# Patient Record
Sex: FEMALE | Race: WHITE | NOT HISPANIC OR LATINO | Employment: OTHER | URBAN - METROPOLITAN AREA
[De-identification: names, ages, dates, MRNs, and addresses within clinical notes are randomized per-mention and may not be internally consistent; named-entity substitution may affect disease eponyms.]

---

## 2017-01-10 ENCOUNTER — APPOINTMENT (OUTPATIENT)
Dept: MEDICAL GROUP | Facility: PHYSICIAN GROUP | Age: 77
End: 2017-01-10
Payer: MEDICARE

## 2017-02-10 ENCOUNTER — OFFICE VISIT (OUTPATIENT)
Dept: MEDICAL GROUP | Facility: MEDICAL CENTER | Age: 77
End: 2017-02-10
Payer: MEDICARE

## 2017-02-10 VITALS
BODY MASS INDEX: 21.66 KG/M2 | HEART RATE: 68 BPM | DIASTOLIC BLOOD PRESSURE: 80 MMHG | WEIGHT: 130 LBS | SYSTOLIC BLOOD PRESSURE: 128 MMHG | HEIGHT: 65 IN | RESPIRATION RATE: 16 BRPM | TEMPERATURE: 99.1 F | OXYGEN SATURATION: 98 %

## 2017-02-10 DIAGNOSIS — I87.2 CHRONIC VENOUS INSUFFICIENCY: ICD-10-CM

## 2017-02-10 DIAGNOSIS — I10 ESSENTIAL HYPERTENSION: ICD-10-CM

## 2017-02-10 DIAGNOSIS — M81.0 OSTEOPOROSIS: ICD-10-CM

## 2017-02-10 DIAGNOSIS — Z00.00 HEALTH CARE MAINTENANCE: ICD-10-CM

## 2017-02-10 DIAGNOSIS — K21.9 GASTROESOPHAGEAL REFLUX DISEASE WITHOUT ESOPHAGITIS: ICD-10-CM

## 2017-02-10 DIAGNOSIS — K86.2 PANCREATIC CYST: ICD-10-CM

## 2017-02-10 DIAGNOSIS — R73.02 IGT (IMPAIRED GLUCOSE TOLERANCE): ICD-10-CM

## 2017-02-10 DIAGNOSIS — C50.011 MALIGNANT NEOPLASM OF AREOLA OF RIGHT BREAST IN FEMALE (HCC): ICD-10-CM

## 2017-02-10 PROCEDURE — 90662 IIV NO PRSV INCREASED AG IM: CPT | Performed by: FAMILY MEDICINE

## 2017-02-10 PROCEDURE — 99214 OFFICE O/P EST MOD 30 MIN: CPT | Mod: 25 | Performed by: FAMILY MEDICINE

## 2017-02-10 PROCEDURE — 4040F PNEUMOC VAC/ADMIN/RCVD: CPT | Mod: 8P | Performed by: FAMILY MEDICINE

## 2017-02-10 PROCEDURE — G8482 FLU IMMUNIZE ORDER/ADMIN: HCPCS | Performed by: FAMILY MEDICINE

## 2017-02-10 PROCEDURE — 1036F TOBACCO NON-USER: CPT | Performed by: FAMILY MEDICINE

## 2017-02-10 PROCEDURE — G8419 CALC BMI OUT NRM PARAM NOF/U: HCPCS | Performed by: FAMILY MEDICINE

## 2017-02-10 PROCEDURE — 1101F PT FALLS ASSESS-DOCD LE1/YR: CPT | Performed by: FAMILY MEDICINE

## 2017-02-10 PROCEDURE — G0008 ADMIN INFLUENZA VIRUS VAC: HCPCS | Performed by: FAMILY MEDICINE

## 2017-02-10 RX ORDER — GUAIFENESIN 400 MG/1
1 TABLET ORAL DAILY
COMMUNITY
End: 2020-06-11

## 2017-02-10 ASSESSMENT — PATIENT HEALTH QUESTIONNAIRE - PHQ9: CLINICAL INTERPRETATION OF PHQ2 SCORE: 0

## 2017-02-10 NOTE — PROGRESS NOTES
CC: Establish new PCP.    HPI:  Irina presents today to establish new primary care relationship. Was a patient of Dr Mcgill.    Active, and independent with all ADLs. Has the following medical issues:    Essential hypertension, BP has been adequately controlled on current medication. Denies headache, chest pain, and SOB.She is on lisinopril 10 mg daily.No side effects.    Gastroesophageal reflux disease , has been doing fine on Omeprazole 20 mg daily.Denies epigastric pain, and heart burn.    Impaired glucose tolerance, his last A1C was 5.8, BS was 113.Denies excessive urination and excessive drinking of water. Patient was counseled about life style modification ( low carb diet, and exercise),     Has Pancreatic cyst that has been asymptomatic. Ct abdomen was done in 9/2016 showed, in the dorsal head/uncinate process of the pancreas there is an unchanged oval cystic lesion which with no demonstratable internal enhancement. On the arterial phase images this measures 17x 8 x 14 mm. There may be a few tiny adjacent cysts superior and ventral to the dominant lesion.The pancreatic duct is normal in caliber and patent and courses along the peripheral aspect of the cyst but does not definitely communicate with the cyst.    Last bone density scan was done in 10/2014, showed osteoporosis. Has been on vit and calcium, tried medications in the patient , but has some side effects. Does not remember which one.No recent h/o fractures.    Chronic venous insufficiency, patient has h/o venous striping in the past.And a recent laser venous ablation . Has been having chronic leg swelling, itching, or legs ulcer. Has been using compression stocking.    Has h/o right breast cancer in 2006, underwent lumpectomy and radiation. Has had no problem since then, she is due for mammogram.    Due for flu shot. Was told in the past she is allergic to pneumonia shot ( had a bad reaction only on the arm).    Patient Active Problem List     Diagnosis Date Noted   • Peripheral vascular disease (CMS-HCC) 05/15/2015     Priority: High   • Raynaud's phenomenon (by history or observed) 02/14/2014     Priority: Medium   • Chronic venous insufficiency 02/14/2014     Priority: Medium   • Hypertension 12/05/2013     Priority: Medium   • MEDICAL HOME    • Right knee pain 06/08/2015   • Cyst and pseudocyst of pancreas 06/16/2014   • Essential hypertension 03/03/2014   • Left knee pain 12/30/2013   • Osteoarthritis 12/07/2013   • Diverticulosis 12/05/2013   • Pancreatic cyst 12/05/2013       Current Outpatient Prescriptions   Medication Sig Dispense Refill   • Misc Natural Products (BUTCHERS BROOM PO) Take  by mouth.     • Guaifenesin 400 MG Tab Take  by mouth.     • lisinopril (PRINIVIL) 10 MG Tab Take 1 Tab by mouth every day. 90 Tab 3   • omeprazole (PRILOSEC) 20 MG delayed-release capsule Take 1 Cap by mouth every day. 90 Cap 3   • loratadine (CLARITIN) 10 MG TABS Take 10 mg by mouth every day.     • alprazolam (XANAX) 0.25 MG TABS Take 1 Tab by mouth at bedtime as needed. 30 Tab 0   • Multiple Vitamins-Minerals (MULTIVITAMIN PO) Take  by mouth every day.       No current facility-administered medications for this visit.         Allergies as of 02/10/2017 - Osvaldo as Reviewed 02/10/2017   Allergen Reaction Noted   • Ibuprofen  07/27/2014   • Morphine Vomiting 12/17/2013   • Other misc  12/17/2013        Social History     Social History   • Marital Status:      Spouse Name: N/A   • Number of Children: N/A   • Years of Education: N/A     Occupational History   • Not on file.     Social History Main Topics   • Smoking status: Never Smoker    • Smokeless tobacco: Not on file   • Alcohol Use: No   • Drug Use: No   • Sexual Activity: Not Currently     Birth Control/ Protection: Post-Menopausal     Other Topics Concern   • Not on file     Social History Narrative       Family History   Problem Relation Age of Onset   • Diabetes     • Hypertension     • Stroke    "  • Cancer     • Heart Disease Mother    • Hypertension Mother    • Stroke Mother    • Diabetes Mother    • Heart Disease Brother    • Diabetes Brother        Past Surgical History   Procedure Laterality Date   • Appendectomy  1974   • Tonsillectomy and adenoidectomy  as child   • Hysterectomy, total abdominal  1974                           • Colonoscopy - endo  12/6/2013     Performed by Markus Juarez D.O. at ENDOSCOPY [facility]   • Vein stripping  1974/1999/2013     b/l legs   • Cataract extraction with iol  2003     bilateral   • Knee arthroplasty total  12/30/2013     left; Performed by Markus York M.D. at SUR[facility]ERY [facility]   • Lumpectomy  2006     right breast   • Gastroscopy with biopsy  6/16/2014     Performed by Dino Pavon M.D. at [facility]   • Egd w/endoscopic ultrasound  6/16/2014     Performed by Dino Pavon M.D. at [facility]   • Pr radiation therapy plan simple  2006   • Knee arthroplasty total Right 6/8/2015     Procedure: KNEE ARTHROPLASTY TOTAL;  Surgeon: Markus York M.D.;  Location: [facility];  Service:        ROS:  Denies any Headache, Blurred Vision, Confusion Chest pain,  Shortness of breath,  Abdominal pain, Changes of bowel or bladder, Lower ext edema, Fevers, Nights sweats, Weight Changes, Focal weakness or numbness.  All other systems are negative.    /80 mmHg  Pulse 68  Temp(Src) 37.3 °C (99.1 °F)  Resp 16  Ht 1.651 m (5' 5\")  Wt 58.968 kg (130 lb)  BMI 21.63 kg/m2  SpO2 98%    Physical Exam:  Gen:         Alert and oriented, No apparent distress.  HEENT:   Perrla, TM clear,  Oralpharynx no erythema or exudates.  Neck:       No Jugular venous distension, Lymphadenopathy, Thyromegaly, Bruits.  Lungs:     Clear to auscultation bilaterally  CV:          Regular rate and rhythm. No murmurs, rubs or gallops.  Abd:         Soft non tender, non distended. Normal active bowel sounds. No  " hepatosplenomegaly, No pulsatile masses.  Ext:          No clubbing, cyanosis, edema.      Assessment and Plan.   76 y.o. female     1. Essential hypertension  Has been adequately controlled on current medication. Denies headache, chest pain, and SOB.  Continue on lisinopril 10 mg daily.    - CBC WITH DIFFERENTIAL; Future  - COMP METABOLIC PANEL; Future  - LIPID PANEL  - TSH; Future    2. Gastroesophageal reflux disease without esophagitis  Doing fine on Omeprazole 20 mg daily.    3. IGT (impaired glucose tolerance)  Last A1C was 5.8, BS was 113.  Patient was counseled about life style modification ( low carb diet, and exercise),     4. Pancreatic cyst  Stable. Asymptomatic  Will continue observe.    5. Osteoporosis  Last bone density scan was done in 10/2014, showed osteoporosis. Has been on vit and calcium, tried medications in the patient , but has some side effects. Does remember which one.  Will repeat bone density.    6. Health care maintenance  Due for flu shot. Was told in the past she is allergic to pneumonia shot ( had a bad reaction only on the arm).    - INFLUENZA VACCINE, HIGH DOSE (65+ ONLY)    7. Chronic venous insufficiency  Has had striping in the past.  Uses compression stocking.    8. Malignant neoplasm of areola of right breast in female (CMS-HCC)  In 2006, s/p lumpectomy and radiation. No problem since then  Due for mammogram.    - MA-SCREEN MAMMO W/CAD-BILAT

## 2017-02-10 NOTE — MR AVS SNAPSHOT
"        Irina Fuller   2/10/2017 9:20 AM   Office Visit   MRN: [id number]    Department:  [address] [place] [address]   Dept Phone:  [phone]    Description:  Female : 1940   Provider:  Vic Maciel M.D.           Reason for Visit     Establish Care           Allergies as of 2/10/2017     Allergen Noted Reactions    Ibuprofen 2014       Stomach upset    Morphine 2013   Vomiting    Other Misc 2013       Metals: zinc oxide, vanadium chloride, manganese chloride      You were diagnosed with     Essential hypertension   [5871494]       Gastroesophageal reflux disease without esophagitis   [609116]       IGT (impaired glucose tolerance)   [996224]       Pancreatic cyst   [207147]       Osteoporosis   [1103723]       Health care maintenance   [741001]       Chronic venous insufficiency   [172735]       Malignant neoplasm of areola of right breast in female (CMS-HCC)   [3693002]         Vital Signs     Blood Pressure Pulse Temperature Respirations Height Weight    128/80 mmHg 68 37.3 °C (99.1 °F) 16 1.651 m (5' 5\") 58.968 kg (130 lb)    Body Mass Index Oxygen Saturation Smoking Status             21.63 kg/m2 98% Never Smoker          Basic Information     Date Of Birth Sex Race Ethnicity Preferred Language    1940 Female White Non- English      Your appointments     May 10, 2017  9:00 AM   Established Patient with Vic Maciel M.D.   [facility] [address]    [address] [facility] [address]  [facility] [address]   [phone]           You will be receiving a confirmation call a few days before your appointment from our automated call confirmation system.              Problem List              ICD-10-CM Priority Class Noted - Resolved    Diverticulosis K57.90   2013 - Present    Hypertension I10 Medium  2013 - Present    Pancreatic cyst K86.2   2013 - Present    Osteoarthritis M19.90   2013 - Present    Left knee pain M25.562   " 12/30/2013 - Present    Raynaud's phenomenon (by history or observed) I73.00 Medium  2/14/2014 - Present    Chronic venous insufficiency I87.2 Medium  2/14/2014 - Present    Essential hypertension I10   3/3/2014 - Present    Cyst and pseudocyst of pancreas K86.2, K86.3   6/16/2014 - Present    Peripheral vascular disease (CMS-HCC) I73.9 High  5/15/2015 - Present    Right knee pain M25.561   6/8/2015 - Present    MEDICAL HOME    Unknown - Present      Health Maintenance        Date Due Completion Dates    IMM DTaP/Tdap/Td Vaccine (1 - Tdap) 4/3/1957 ---    PAP SMEAR 4/3/1959 ---    IMM ZOSTER VACCINE 4/3/1998 ---    IMM PNEUMOCOCCAL 65+ (ADULT) LOW/MEDIUM RISK SERIES (1 of 2 - PCV13) 4/3/2003 ---    MAMMOGRAM 10/14/2015 10/14/2014, 10/14/2014, 10/14/2014, 10/14/2014, 10/14/2014, 10/8/2014    IMM INFLUENZA (1) 9/1/2016 ---    BONE DENSITY 10/8/2019 10/8/2014    COLONOSCOPY 12/6/2023 12/6/2013            Current Immunizations     Influenza Vaccine Adult HD  Incomplete      Below and/or attached are the medications your provider expects you to take. Review all of your home medications and newly ordered medications with your provider and/or pharmacist. Follow medication instructions as directed by your provider and/or pharmacist. Please keep your medication list with you and share with your provider. Update the information when medications are discontinued, doses are changed, or new medications (including over-the-counter products) are added; and carry medication information at all times in the event of emergency situations     Allergies:  IBUPROFEN - (reactions not documented)     MORPHINE - Vomiting     OTHER MISC - (reactions not documented)               Medications  Valid as of: February 10, 2017 - 10:08 AM    Generic Name Brand Name Tablet Size Instructions for use    GuaiFENesin (Tab) Guaifenesin 400 MG Take  by mouth.        Lisinopril (Tab) PRINIVIL 10 MG Take 1 Tab by mouth every day.        Loratadine (Tab)  CLARITIN 10 MG Take 10 mg by mouth every day.        Misc Natural Products   Take  by mouth.        Multiple Vitamins-Minerals   Take  by mouth every day.        Omeprazole (CAPSULE DELAYED RELEASE) PRILOSEC 20 MG Take 1 Cap by mouth every day.        .                 Medicines prescribed today were sent to:     [id number] - [place], [place] - [address] [place] [address]    Phone: [phone] Fax: [phone]    Open 24 Hours?: No      Medication refill instructions:       If your prescription bottle indicates you have medication refills left, it is not necessary to call your provider’s office. Please contact your pharmacy and they will refill your medication.    If your prescription bottle indicates you do not have any refills left, you may request refills at any time through one of the following ways: The online [employer]yo system (except Urgent Care), by calling your provider’s office, or by asking your pharmacy to contact your provider’s office with a refill request. Medication refills are processed only during regular business hours and may not be available until the next business day. Your provider may request additional information or to have a follow-up visit with you prior to refilling your medication.   *Please Note: Medication refills are assigned a new Rx number when refilled electronically. Your pharmacy may indicate that no refills were authorized even though a new prescription for the same medication is available at the pharmacy. Please request the medicine by name with the pharmacy before contacting your provider for a refill.        Your To Do List     Future Labs/Procedures Complete By Expires    CBC WITH DIFFERENTIAL  As directed 2/10/2018    COMP METABOLIC PANEL  As directed 2/10/2018    DS-BONE DENSITY STUDY (DEXA)  As directed 8/13/2017    TSH  As directed 2/11/2018      Other Notes About Your Plan     Irina is a Medical Home patient at [facility].           [employer] Access Code: Activation code not generated  Current [employer] Status: Active

## 2017-02-14 ENCOUNTER — PATIENT OUTREACH (OUTPATIENT)
Dept: HEALTH INFORMATION MANAGEMENT | Facility: OTHER | Age: 77
End: 2017-02-14

## 2017-02-14 NOTE — PROGRESS NOTES
Pt referred to  by PCP. LSW received incoming call from pt today. She reported PCP gave her LSW contact information to determine if there was assistance available to obtain compression socks. Pt has prescription from PCP but reported she called her insurance and it is not covered. Pt reported she has limited income.     Reviewed pt's current income/resources and programs she is currently receiving. Pt's is a  and make $1119 from [employer] a month. She denies any Medicare deductions reporting that is paid for by the state. Via information it is likely pt is already enrolled int he Medicare Savings Programs through Medicaid. Through further discussion pt also has a full subsidy with [employer] and receives $16/month in SNAP benefits. Pt does not pay and energy bill; thus, does not have the Energy Assistance progam.    Discussed with pt that [facility] sometimes gets in compression socks and if qualified she maybe able to get from organization. Outreach call to [facility], representative reported they do no currently have any in stock and it is an item that agency does not get frequently and when they do item goes quickly. Pt voiced understanding. Provided pt with contact information to [facility] to allow her to follow up with agency directly. Also provided pt with [facility] [facility] information as agency may also be able to assist or help with other needs. Pt voiced understanding.     Lastly discussed that pt may need to pay privately for compression socks. Encouraged pt to shop around and speak with PCP regarding brand recommendations. Pt voiced understanding. Pt provided with contact information should she have additional questions.     Plan:  · Resource information on [facility] and [employer] mailed to pt. Pt to follow up with organizations directly.   · LSW will not actively follow pt at this time.

## 2017-03-22 ENCOUNTER — HOSPITAL ENCOUNTER (OUTPATIENT)
Dept: RADIOLOGY | Facility: MEDICAL CENTER | Age: 77
End: 2017-03-22
Attending: FAMILY MEDICINE
Payer: MEDICARE

## 2017-03-22 PROCEDURE — 77063 BREAST TOMOSYNTHESIS BI: CPT

## 2017-03-23 RX ORDER — OMEPRAZOLE 20 MG/1
CAPSULE, DELAYED RELEASE ORAL
Refills: 3 | OUTPATIENT
Start: 2017-03-23

## 2017-03-27 RX ORDER — OMEPRAZOLE 20 MG/1
CAPSULE, DELAYED RELEASE ORAL
Refills: 3 | OUTPATIENT
Start: 2017-03-27

## 2017-03-28 RX ORDER — OMEPRAZOLE 20 MG/1
CAPSULE, DELAYED RELEASE ORAL
Refills: 3 | OUTPATIENT
Start: 2017-03-28

## 2017-03-31 DIAGNOSIS — I10 ESSENTIAL HYPERTENSION: ICD-10-CM

## 2017-03-31 DIAGNOSIS — F41.9 ANXIETY: ICD-10-CM

## 2017-03-31 DIAGNOSIS — K21.9 GASTROESOPHAGEAL REFLUX DISEASE WITHOUT ESOPHAGITIS: ICD-10-CM

## 2017-03-31 RX ORDER — LISINOPRIL 10 MG/1
10 TABLET ORAL
Qty: 90 TAB | Refills: 3 | Status: SHIPPED | OUTPATIENT
Start: 2017-03-31 | End: 2018-02-06 | Stop reason: SDUPTHER

## 2017-03-31 RX ORDER — ALPRAZOLAM 0.25 MG/1
0.25 TABLET ORAL NIGHTLY PRN
Qty: 30 TAB | Refills: 0
Start: 2017-03-31

## 2017-03-31 RX ORDER — OMEPRAZOLE 20 MG/1
20 CAPSULE, DELAYED RELEASE ORAL DAILY
Qty: 90 CAP | Refills: 3 | Status: SHIPPED | OUTPATIENT
Start: 2017-03-31 | End: 2018-02-06 | Stop reason: SDUPTHER

## 2017-04-05 ENCOUNTER — PATIENT OUTREACH (OUTPATIENT)
Dept: HEALTH INFORMATION MANAGEMENT | Facility: OTHER | Age: 77
End: 2017-04-05

## 2017-04-06 RX ORDER — ALPRAZOLAM 0.25 MG/1
0.25 TABLET ORAL NIGHTLY PRN
Qty: 30 TAB | Refills: 0 | Status: SHIPPED
Start: 2017-04-06 | End: 2018-02-06

## 2017-04-06 NOTE — TELEPHONE ENCOUNTER
Was the patient seen in the last year in this department? Yes     Does patient have an active prescription for medications requested? No     Received Request Via: Patient      Patient stated that she is flying next month ? Please advise

## 2017-04-12 ENCOUNTER — TELEPHONE (OUTPATIENT)
Dept: MEDICAL GROUP | Facility: MEDICAL CENTER | Age: 77
End: 2017-04-12

## 2017-04-12 ENCOUNTER — TELEPHONE (OUTPATIENT)
Dept: HEALTH INFORMATION MANAGEMENT | Facility: OTHER | Age: 77
End: 2017-04-12

## 2017-04-12 NOTE — TELEPHONE ENCOUNTER
Patient is over the recommended age and is showing overdue for a pap smear in Health Maintenance.    Please reply to this message as to whether these tests are appropriate and I will update the Health Maintenance Topic or contact the patient to schedule.    Patient also reported having a reaction to the pneumococcal vaccine when she got it is 2009 with her previous provider in [place]. She reported having severe swelling and pain in her arm. She is still showing due for this immunization on her health maintenance table.

## 2017-04-12 NOTE — TELEPHONE ENCOUNTER
Future Appointments      Provider Department [place]   4/20/2017 2:00 PM Vic Maciel M.D.; [facility] [address]    5/10/2017 9:00 AM Vic Maciel M.D. [place] [address]      ANNUAL WELLNESS VISIT PRE-VISIT PLANNING     1.  Reviewed last PCP office visit assessment and plan notes: Yes  2.  If any orders were placed last visit do we have Results/Consult Notes?        •  Labs? Yes not completed       •  Imaging? Yes 03/22/17 Mammogram       •  Referrals? Yes 02/10/17 DEXA  3.   Patient Care Coordination Note was updated with  Diagnosis information: yes there is no note  4.   Updated immunizations by using WebIZ?: yes  · Is patient due for Tdap/Shingles? Yes.  If yes, was patient alerted of copay? yes   5.   Has the patient had the flu vaccine this season? Yes.  6.   Has patient had the pneumococcal vaccine? No.   7.   Last office visit 02/10/17          Patient is due for these Health Maintenance Topics:   Health Maintenance Due   Topic Date Due   • Annual Wellness Visit  Scheduled for 04/20/17   • IMM DTaP/Tdap/Td Vaccine (1 - Tdap) 04/03/1957   • IMM ZOSTER VACCINE  04/03/1998   • IMM PREVNAR 13 04/03/2003       8.    Updated Care Team with DME Companies and all specialists?        •   Gait devices, O2, CPAP, etc: N\A        •   Other specialists (GYN, cardiology, endo, etc): yes        •   Eye professional: no When was last eye exam? Several  years        •   ZORAN letter will be faxed to:  Eye Dr for Retinal Exam records    9. DPA/Advanced Directive:  Patient has Living Will, but it is not on file. Instructed to bring in a copy to scan into their chart.    10. Patient has: No chronic diseases to review    11.  Is patient in need of any refills prior to office visit? No  12. Patient was informed to arrive 15 min prior to their scheduled appointment and bring in their medication bottles? yes  13. Patient was advised: “This is a free wellness visit. The  provider will screen for medical conditions to help you stay healthy. If you have other concerns to address you may be asked to discuss these at a separate visit or there may be an additional fee.”  Yes

## 2017-04-13 RX ORDER — FLUTICASONE PROPIONATE 50 MCG
1 SPRAY, SUSPENSION (ML) NASAL DAILY
COMMUNITY
End: 2018-02-06

## 2017-04-13 RX ORDER — DIOSMIN 100 %
600 POWDER (GRAM) MISCELLANEOUS
COMMUNITY
End: 2018-02-06

## 2017-04-14 ENCOUNTER — HOSPITAL ENCOUNTER (OUTPATIENT)
Dept: LAB | Facility: MEDICAL CENTER | Age: 77
End: 2017-04-14
Attending: FAMILY MEDICINE
Payer: MEDICARE

## 2017-04-14 DIAGNOSIS — I10 ESSENTIAL HYPERTENSION: ICD-10-CM

## 2017-04-14 LAB
ALBUMIN SERPL BCP-MCNC: 4.2 G/DL (ref 3.2–4.9)
ALBUMIN/GLOB SERPL: 1.4 G/DL
ALP SERPL-CCNC: 75 U/L (ref 30–99)
ALT SERPL-CCNC: 10 U/L (ref 2–50)
ANION GAP SERPL CALC-SCNC: 8 MMOL/L (ref 0–11.9)
AST SERPL-CCNC: 15 U/L (ref 12–45)
BASOPHILS # BLD AUTO: 1.2 % (ref 0–1.8)
BASOPHILS # BLD: 0.04 K/UL (ref 0–0.12)
BILIRUB SERPL-MCNC: 0.7 MG/DL (ref 0.1–1.5)
BUN SERPL-MCNC: 20 MG/DL (ref 8–22)
CALCIUM SERPL-MCNC: 9.5 MG/DL (ref 8.5–10.5)
CHLORIDE SERPL-SCNC: 105 MMOL/L (ref 96–112)
CO2 SERPL-SCNC: 25 MMOL/L (ref 20–33)
CREAT SERPL-MCNC: 0.82 MG/DL (ref 0.5–1.4)
EOSINOPHIL # BLD AUTO: 0.11 K/UL (ref 0–0.51)
EOSINOPHIL NFR BLD: 3.2 % (ref 0–6.9)
ERYTHROCYTE [DISTWIDTH] IN BLOOD BY AUTOMATED COUNT: 40.4 FL (ref 35.9–50)
GFR SERPL CREATININE-BSD FRML MDRD: >60 ML/MIN/1.73 M 2
GLOBULIN SER CALC-MCNC: 2.9 G/DL (ref 1.9–3.5)
GLUCOSE SERPL-MCNC: 126 MG/DL (ref 65–99)
HCT VFR BLD AUTO: 42.4 % (ref 37–47)
HGB BLD-MCNC: 14.7 G/DL (ref 12–16)
IMM GRANULOCYTES # BLD AUTO: 0.01 K/UL (ref 0–0.11)
IMM GRANULOCYTES NFR BLD AUTO: 0.3 % (ref 0–0.9)
LYMPHOCYTES # BLD AUTO: 0.78 K/UL (ref 1–4.8)
LYMPHOCYTES NFR BLD: 22.9 % (ref 22–41)
MCH RBC QN AUTO: 31.7 PG (ref 27–33)
MCHC RBC AUTO-ENTMCNC: 34.7 G/DL (ref 33.6–35)
MCV RBC AUTO: 91.4 FL (ref 81.4–97.8)
MONOCYTES # BLD AUTO: 0.51 K/UL (ref 0–0.85)
MONOCYTES NFR BLD AUTO: 15 % (ref 0–13.4)
NEUTROPHILS # BLD AUTO: 1.95 K/UL (ref 2–7.15)
NEUTROPHILS NFR BLD: 57.4 % (ref 44–72)
NRBC # BLD AUTO: 0 K/UL
NRBC BLD AUTO-RTO: 0 /100 WBC
PLATELET # BLD AUTO: 269 K/UL (ref 164–446)
PMV BLD AUTO: 11.3 FL (ref 9–12.9)
POTASSIUM SERPL-SCNC: 3.8 MMOL/L (ref 3.6–5.5)
PROT SERPL-MCNC: 7.1 G/DL (ref 6–8.2)
RBC # BLD AUTO: 4.64 M/UL (ref 4.2–5.4)
SODIUM SERPL-SCNC: 138 MMOL/L (ref 135–145)
TSH SERPL DL<=0.005 MIU/L-ACNC: 3.35 UIU/ML (ref 0.3–3.7)
WBC # BLD AUTO: 3.4 K/UL (ref 4.8–10.8)

## 2017-04-14 PROCEDURE — 84443 ASSAY THYROID STIM HORMONE: CPT

## 2017-04-14 PROCEDURE — 85025 COMPLETE CBC W/AUTO DIFF WBC: CPT

## 2017-04-14 PROCEDURE — 36415 COLL VENOUS BLD VENIPUNCTURE: CPT

## 2017-04-14 PROCEDURE — 80053 COMPREHEN METABOLIC PANEL: CPT

## 2017-04-20 ENCOUNTER — APPOINTMENT (OUTPATIENT)
Dept: MEDICAL GROUP | Facility: MEDICAL CENTER | Age: 77
End: 2017-04-20
Payer: MEDICARE

## 2017-05-09 ENCOUNTER — TELEPHONE (OUTPATIENT)
Dept: MEDICAL GROUP | Facility: MEDICAL CENTER | Age: 77
End: 2017-05-09

## 2017-05-09 NOTE — TELEPHONE ENCOUNTER
Future Appointments       Provider Department Center    5/10/2017 9:00 AM Vic Maciel M.D. [facility] [address]          ESTABLISHED PATIENT PRE-VISIT PLANNING     Note: Patient will not be contacted if there is no indication to call. PT was not Contacted.    1.    Reviewed note from last office visit with PCP: YES Last office visit: 02/10/17    2.  If any orders were placed at last visit, do we have Results/Consult Notes?        •  Labs - Labs ordered, completed and results are in chart. 04/14/17       •  Imaging - Imaging ordered, completed and results are in chart. 02/10/17 MAMMO       •  Referrals - No referrals were ordered at last office visit.     3.  Immunizations were updated in Epic using WebIZ?: No WebIZ record       •  Web Iz Recommendations: HEPATITIS A  HEPATITIS B PREVNAR (PCV13)  TDAP VARICELLA (Chicken Pox)  ZOSTAVAX (Shingles)    4.  Patient is due for the following Health Maintenance Topics:   Health Maintenance Due   Topic Date Due   • Annual Wellness Visit  NEEDS TO SCHEDULE   • IMM DTaP/Tdap/Td Vaccine (1 - Tdap) $45 COPAY APPLIES   • IMM ZOSTER VACCINE  $45 COPAY APPLIES   • IMM PREVNAR 13 DUE       5.  Patient was not informed to arrive 15 min prior to their scheduled appointment and bring in their medication bottles.

## 2017-05-10 ENCOUNTER — APPOINTMENT (OUTPATIENT)
Dept: MEDICAL GROUP | Facility: MEDICAL CENTER | Age: 77
End: 2017-05-10
Payer: MEDICARE

## 2017-05-17 ENCOUNTER — APPOINTMENT (OUTPATIENT)
Dept: MEDICAL GROUP | Facility: MEDICAL CENTER | Age: 77
End: 2017-05-17
Payer: MEDICARE

## 2018-02-06 ENCOUNTER — OFFICE VISIT (OUTPATIENT)
Dept: MEDICAL GROUP | Facility: PHYSICIAN GROUP | Age: 78
End: 2018-02-06
Payer: MEDICARE

## 2018-02-06 VITALS
OXYGEN SATURATION: 96 % | SYSTOLIC BLOOD PRESSURE: 124 MMHG | BODY MASS INDEX: 19.99 KG/M2 | HEIGHT: 65 IN | DIASTOLIC BLOOD PRESSURE: 74 MMHG | TEMPERATURE: 98.7 F | HEART RATE: 74 BPM | WEIGHT: 120 LBS

## 2018-02-06 DIAGNOSIS — I87.2 CHRONIC VENOUS INSUFFICIENCY: ICD-10-CM

## 2018-02-06 DIAGNOSIS — E78.5 DYSLIPIDEMIA: ICD-10-CM

## 2018-02-06 DIAGNOSIS — I10 ESSENTIAL HYPERTENSION: ICD-10-CM

## 2018-02-06 DIAGNOSIS — K21.9 GASTROESOPHAGEAL REFLUX DISEASE WITHOUT ESOPHAGITIS: ICD-10-CM

## 2018-02-06 PROCEDURE — 99214 OFFICE O/P EST MOD 30 MIN: CPT | Performed by: FAMILY MEDICINE

## 2018-02-06 RX ORDER — OMEPRAZOLE 20 MG/1
20 CAPSULE, DELAYED RELEASE ORAL DAILY
Qty: 90 CAP | Refills: 3 | Status: SHIPPED | OUTPATIENT
Start: 2018-02-06 | End: 2019-04-09 | Stop reason: SDUPTHER

## 2018-02-06 RX ORDER — LISINOPRIL 10 MG/1
10 TABLET ORAL
Qty: 90 TAB | Refills: 3 | Status: SHIPPED | OUTPATIENT
Start: 2018-02-06 | End: 2019-02-08

## 2018-02-06 RX ORDER — KRILL/OM-3/DHA/EPA/PHOSPHO/AST 500MG-86MG
CAPSULE ORAL
COMMUNITY
End: 2018-11-30

## 2018-02-06 ASSESSMENT — PATIENT HEALTH QUESTIONNAIRE - PHQ9: CLINICAL INTERPRETATION OF PHQ2 SCORE: 0

## 2018-02-06 NOTE — ASSESSMENT & PLAN NOTE
Ongoing issue. Patient reports compliance with lisinopril 10 mg every day; she currently denies any headache, dizziness, chest pain. To review shows a blood pressure heart rate both her normal range for her age.

## 2018-02-06 NOTE — ASSESSMENT & PLAN NOTE
Ongoing issue. Patient reports that she takes omeprazole 20 mg daily; she states that this helps to control her symptoms of heartburn. She currently does not have to take any additional medication.    Of note, review of her chart shows a normal kidney function.

## 2018-02-06 NOTE — ASSESSMENT & PLAN NOTE
Ongoing issue. Patient tells me of a long-standing history of vascular issues in her lower extremities. Based on our discussion she has had at least 3 surgeries due to varicose veins. She currently is wearing compression stockings and states that she continues to have issues on a regular basis. All of her previous surgeries were not in the state of [place]. She will need a new referral for reevaluation and possible treatment.

## 2018-02-06 NOTE — PROGRESS NOTES
Subjective:   Irina Fuller is a 77 y.o. female here today for HTN, leg swelling and pain; GERD    Hypertension  Ongoing issue. Patient reports compliance with lisinopril 10 mg every day; she currently denies any headache, dizziness, chest pain. To review shows a blood pressure heart rate both her normal range for her age.    Chronic venous insufficiency  Ongoing issue. Patient tells me of a long-standing history of vascular issues in her lower extremities. Based on our discussion she has had at least 3 surgeries due to varicose veins. She currently is wearing compression stockings and states that she continues to have issues on a regular basis. All of her previous surgeries were not in the state of [place]. She will need a new referral for reevaluation and possible treatment.    Gastroesophageal reflux disease without esophagitis  Ongoing issue. Patient reports that she takes omeprazole 20 mg daily; she states that this helps to control her symptoms of heartburn. She currently does not have to take any additional medication.    Of note, review of her chart shows a normal kidney function.     Pt is here today to est care    Current medicines (including changes today)  Current Outpatient Prescriptions   Medication Sig Dispense Refill   • Zinc 50 MG Cap Take 50 mg by mouth every day.     • Multiple Vitamins-Iron (CHLORELLA PO) Take  by mouth.     • Krill Oil 500 MG Cap Take  by mouth.     • lisinopril (PRINIVIL) 10 MG Tab Take 1 Tab by mouth every day. 90 Tab 3   • omeprazole (PRILOSEC) 20 MG delayed-release capsule Take 1 Cap by mouth every day. 90 Cap 3   • loratadine (CLARITIN) 10 MG TABS Take 10 mg by mouth every day.     • Multiple Vitamins-Minerals (MULTIVITAMIN PO) Take  by mouth every day.     • Guaifenesin 400 MG Tab Take  by mouth.       No current facility-administered medications for this visit.      She  has a past medical history of Anesthesia (1974/2002); Arthritis; Breast cancer (CMS-[facility]); Cancer  "(CMS-HCC) (2006); CATARACT; Dental disorder; Diverticulitis; GERD (gastroesophageal reflux disease); Heart burn (12/17/13); Hypertension; Indigestion; MEDICAL HOME (6/9/2015); Osteoporosis; Pain; Personal history of venous thrombosis and embolism (12/31/2013); Pneumonia (1984); PVD (peripheral vascular disease) (CMS-HCC); and Unspecified hemorrhagic conditions.    ROS   No chest pain, no shortness of breath, no abdominal pain  +leg swelling and pain     Objective:     Blood pressure 124/74, pulse 74, temperature 37.1 °C (98.7 °F), height 1.651 m (5' 5\"), weight 54.4 kg (120 lb), SpO2 96 %. Body mass index is 19.97 kg/m².   Physical Exam:  Alert, oriented in no acute distress.  Eye contact is good, speech goal directed, affect calm  HEENT: conjunctiva non-injected, sclera non-icteric.  Pinna normal. TM pearly gray.   Oral mucous membranes pink and moist with no lesions.  Neck No adenopathy or masses in the neck or supraclavicular regions.  Lungs: clear to auscultation bilaterally with good excursion.  CV: regular rate and rhythm. Mild systolic ejection murmur noted  Abdomen: soft, nontender, No CVAT  Ext: 2+pitting edema bilaterally, color normal, vascularity normal, temperature normal        Assessment and Plan:   The following treatment plan was discussed     1. Essential hypertension  lisinopril (PRINIVIL) 10 MG Tab    COMP METABOLIC PANEL    MICROALBUMIN CREAT RATIO URINE    Stable. Continue current medications; refill provided. Monitor   2. Gastroesophageal reflux disease without esophagitis  omeprazole (PRILOSEC) 20 MG delayed-release capsule    Stable. Continue current medications; refill provided. Monitor   3. Chronic venous insufficiency  REFERRAL TO VASCULAR SURGERY    Uncontrolled. Prescription for new compression stockings provided; referral for vascular surgery   4. Dyslipidemia  LIPID PROFILE    Sent for lab evaluation due to chronic venous insufficiency       Followup: Return in about 6 months (around " 8/6/2018) for HRA.

## 2018-03-08 ENCOUNTER — PATIENT OUTREACH (OUTPATIENT)
Dept: HEALTH INFORMATION MANAGEMENT | Facility: OTHER | Age: 78
End: 2018-03-08

## 2018-03-08 NOTE — PROGRESS NOTES
1. Attempt #: FINAL    2. HealthConnect Verified: yes    3. Verify PCP: yes    4. Care Team Updated:       •   DME Company (gait device, O2, CPAP, etc.): NO       •   Other Specialists (eye doctor, derm, GYN, cardiology, endo, etc): NO    5.  Reviewed/Updated the following with patient:       •   Communication Preference Obtained? YES       •   Preferred Pharmacy? YES       •   Preferred Lab? YES       •   Family History (document living status of immediate family members and if + hx of cancer, diabetes, hypertension, hyperlipidemia, heart attack, stroke) YES. Was Abstract Encounter opened and chart updated? YES    6. [employer] Activation: already active    7. [employer] Suzie: yes    8. Annual Wellness Visit Scheduling  Scheduling Status:Scheduled      9. Care Gap Scheduling (Attempt to Schedule EACH Overdue Care Gap!)     Health Maintenance Due   Topic Date Due   • Annual Wellness Visit  1940   • IMM DTaP/Tdap/Td Vaccine (1 - Tdap) 04/03/1957   • IMM ZOSTER VACCINE  04/03/1998   • IMM INFLUENZA (1) 09/01/2017        Scheduled patient for Annual Wellness Visit    10. Patient was advised: “This is a free wellness visit. The provider will screen for medical conditions to help you stay healthy. If you have other concerns to address you may be asked to discuss these at a separate visit or there may be an additional fee.”     11. Patient was informed to arrive 15 min prior to their scheduled appointment and bring in their medication bottles.

## 2018-04-27 ENCOUNTER — TELEPHONE (OUTPATIENT)
Dept: MEDICAL GROUP | Facility: PHYSICIAN GROUP | Age: 78
End: 2018-04-27

## 2018-04-27 NOTE — TELEPHONE ENCOUNTER
Future Appointments       Provider Department Center    5/4/2018 10:00 AM Kerrie Reyes D.O.; [facility] ANNUAL WELLNESS VISIT PRE-VISIT PLANNING WITH OUTREACH    1.  Immunizations were updated in Epic using WebIZ?:Yes       •  WebIZ Recommendations: PREVNAR (PCV13) , TDAP and ZOSTAVAX (Shingles)       •  Is patient due for Tdap? YES. Patient was notified of copay/out of pocket cost.       •  Is patient due for Shingles?YES. Patient was notified of copay/out of pocket cost.    2.  MDX printed for Provider? YES     NO CARE PATHS  OPEN LAB, IMAGING ORDERS AND REFERRAL    Health Maintenance Due   Topic Date Due   • Annual Wellness Visit  SCHEDULED FOR 5/4/18   • IMM DTaP/Tdap/Td Vaccine (1 - Tdap) 04/03/1957   • MAMMOGRAM  03/22/2018

## 2018-05-04 ENCOUNTER — OFFICE VISIT (OUTPATIENT)
Dept: MEDICAL GROUP | Facility: PHYSICIAN GROUP | Age: 78
End: 2018-05-04
Payer: MEDICARE

## 2018-05-04 VITALS
HEART RATE: 75 BPM | SYSTOLIC BLOOD PRESSURE: 122 MMHG | TEMPERATURE: 98.3 F | BODY MASS INDEX: 20.16 KG/M2 | WEIGHT: 121 LBS | OXYGEN SATURATION: 96 % | DIASTOLIC BLOOD PRESSURE: 76 MMHG | HEIGHT: 65 IN

## 2018-05-04 DIAGNOSIS — I87.2 CHRONIC VENOUS INSUFFICIENCY: ICD-10-CM

## 2018-05-04 DIAGNOSIS — M15.9 PRIMARY OSTEOARTHRITIS INVOLVING MULTIPLE JOINTS: ICD-10-CM

## 2018-05-04 DIAGNOSIS — K21.9 GASTROESOPHAGEAL REFLUX DISEASE WITHOUT ESOPHAGITIS: ICD-10-CM

## 2018-05-04 DIAGNOSIS — I10 ESSENTIAL HYPERTENSION: ICD-10-CM

## 2018-05-04 DIAGNOSIS — K57.30 DIVERTICULOSIS OF LARGE INTESTINE WITHOUT HEMORRHAGE: ICD-10-CM

## 2018-05-04 DIAGNOSIS — Z00.00 MEDICARE ANNUAL WELLNESS VISIT, SUBSEQUENT: ICD-10-CM

## 2018-05-04 DIAGNOSIS — I73.00 RAYNAUD'S PHENOMENON WITHOUT GANGRENE: ICD-10-CM

## 2018-05-04 PROCEDURE — G0439 PPPS, SUBSEQ VISIT: HCPCS | Performed by: FAMILY MEDICINE

## 2018-05-04 ASSESSMENT — PAIN SCALES - GENERAL: PAINLEVEL: NO PAIN

## 2018-05-04 ASSESSMENT — PATIENT HEALTH QUESTIONNAIRE - PHQ9: CLINICAL INTERPRETATION OF PHQ2 SCORE: 0

## 2018-05-04 ASSESSMENT — ACTIVITIES OF DAILY LIVING (ADL): BATHING_REQUIRES_ASSISTANCE: 0

## 2018-05-04 NOTE — PROGRESS NOTES
Chief Complaint   Patient presents with   • Annual Wellness Visit         HPI:  Irina is a 78 y.o. here for Medicare Annual Wellness Visit    Patient Active Problem List    Diagnosis Date Noted   • Raynaud's phenomenon (by history or observed) 02/14/2014     Priority: Medium   • Chronic venous insufficiency 02/14/2014     Priority: Medium   • Hypertension 12/05/2013     Priority: Medium   • Gastroesophageal reflux disease without esophagitis 02/06/2018   • Osteoarthritis 12/07/2013   • Diverticulosis 12/05/2013       Current Outpatient Prescriptions   Medication Sig Dispense Refill   • Krill Oil 500 MG Cap Take  by mouth.     • lisinopril (PRINIVIL) 10 MG Tab Take 1 Tab by mouth every day. 90 Tab 3   • omeprazole (PRILOSEC) 20 MG delayed-release capsule Take 1 Cap by mouth every day. 90 Cap 3   • Guaifenesin 400 MG Tab Take  by mouth.     • loratadine (CLARITIN) 10 MG TABS Take 10 mg by mouth every day.     • Multiple Vitamins-Minerals (MULTIVITAMIN PO) Take  by mouth every day.     • Zinc 50 MG Cap Take 50 mg by mouth every day.     • Multiple Vitamins-Iron (CHLORELLA PO) Take  by mouth.       No current facility-administered medications for this visit.         Patient is taking medications as noted in medication list.  Current supplements as per medication list.     Allergies: Ibuprofen; Morphine; and Other misc    Current social contact/activities: Yazidism, sings at nursing home, spends time with friends and family   Patient's perception of their health: good    Is patient current with immunizations? No, due for TDAP. Patient is interested in receiving NONE today.    She  reports that she has never smoked. She has never used smokeless tobacco. She reports that she does not drink alcohol or use drugs.  Counseling given: Not Answered        DPA/Advanced directive: Patient has Living Will, but it is not on file. Instructed to bring in a copy to scan into their chart.    ROS:    Gait: Uses no assistive device    Ostomy: No   Other tubes: No   Amputations: No   Chronic oxygen use: No   Last eye exam: 12/2017   Wears hearing aids: No   : Denies any urinary leakage during the last 6 months      Screening:  Depression Screening  Little interest or pleasure in doing things?  0 - not at all  Feeling down, depressed, or hopeless? 0 - not at all  Patient Health Questionnaire Score: 0    If depressive symptoms identified deferred to follow up visit unless specifically addressed in assessment and plan.    Interpretation of PHQ-9 Total Score   Score Severity   1-4 No Depression   5-9 Mild Depression   10-14 Moderate Depression   15-19 Moderately Severe Depression   20-27 Severe Depression    Screening for Cognitive Impairment  Three Minute Recall (apple, watch, lv)  3/3 Village, kitchen, baby  Draw clock face with all 12 numbers set to the hand to show 10 minutes past 11 o'clock  1 3 /5 time 3:40  If cognitive concerns identified, deferred for follow up unless specifically addressed in assessment and plan.    Fall Risk Assessment  Has the patient had two or more falls in the last year or any fall with injury in the last year?  No  If fall risk identified, deferred for follow up unless specifically addressed in assessment and plan.    Safety Assessment  Throw rugs on floor.  No  Handrails on all stairs.  Yes  Good lighting in all hallways.  Yes  Difficulty hearing.  No  Patient counseled about all safety risks that were identified.    Functional Assessment ADLs  Are there any barriers preventing you from cooking for yourself or meeting nutritional needs?  No.    Are there any barriers preventing you from driving safely or obtaining transportation?  No.    Are there any barriers preventing you from using a telephone or calling for help?  No.    Are there any barriers preventing you from shopping?  No.    Are there any barriers preventing you from taking care of your own finances?  No.    Are there any barriers preventing you from  managing your medications?  No.    Are there any barriers preventing you from showering, bathing or dressing yourself?  No.    Are you currently engaging any exercise or physical activity?  Yes.  Exercise, walks 2 miles a day    Health Maintenance Summary                Annual Wellness Visit Overdue 1940     IMM DTaP/Tdap/Td Vaccine Overdue 4/3/1957     MAMMOGRAM Overdue 3/22/2018      Done 3/22/2017 MA-MAMMO SCREENING BILAT W/TOMOSYNTHESIS W/CAD     Patient has more history with this topic...    IMM INFLUENZA Next Due 9/1/2018      Done 2/10/2017 Imm Admin: Influenza Vaccine Adult HD    BONE DENSITY Next Due 10/8/2019      Done 10/8/2014 DS-BONE DENSITY STUDY (DEXA)    COLONOSCOPY Next Due 12/6/2023      Done 12/6/2013 Surg:COLONOSCOPY - ENDO          Patient Care Team:  Kerrie Reyes D.O. as PCP - General (Family Medicine)  Dino Pavon M.D. as Consulting Physician (Gastroenterology)  JOSESITO CarusoPEDDIE as Consulting Physician (Podiatry)  Mauri Rodriguez M.D. as Consulting Physician (Surgery)    Social History   Substance Use Topics   • Smoking status: Never Smoker   • Smokeless tobacco: Never Used   • Alcohol use No     Family History   Problem Relation Age of Onset   • Heart Disease Mother    • Hypertension Mother    • Stroke Mother    • Diabetes Mother    • Dementia Mother    • Arthritis Mother    • Diabetes Brother    • Dementia Brother    • Cancer Father      Colon   • Heart Disease Maternal Grandmother    • Kidney Disease Maternal Grandmother    • Heart Attack Maternal Grandfather    • No Known Problems Brother    • Hypertension Brother    • No Known Problems Brother      She  has a past medical history of Anesthesia (1974/2002); Arthritis; Breast cancer (HCC); Cancer (HCC) (2006); CATARACT; Dental disorder; Diverticulitis; GERD (gastroesophageal reflux disease); Heart burn (12/17/13); Hypertension; Indigestion; MEDICAL HOME (6/9/2015); Osteoporosis; Pain; Personal history of venous  "thrombosis and embolism (12/31/2013); Pneumonia (1984); PVD (peripheral vascular disease) ([facility]); and Unspecified hemorrhagic conditions.   Past Surgical History:   Procedure Laterality Date   • KNEE ARTHROPLASTY TOTAL Right 6/8/2015    Procedure: KNEE ARTHROPLASTY TOTAL;  Surgeon: Markus York M.D.;  Location: [facility];  Service:    • GASTROSCOPY WITH BIOPSY  6/16/2014    Performed by Dino Pavon M.D. at [facility]   • EGD W/ENDOSCOPIC ULTRASOUND  6/16/2014    Performed by Dino Pavon M.D. at [facility]   • KNEE ARTHROPLASTY TOTAL  12/30/2013    left; Performed by Markus York M.D. at [facility]   • COLONOSCOPY - ENDO  12/6/2013    Performed by Markus Juarez D.O. at ENDOSCOPY [facility]   • LUMPECTOMY  2006    right breast   • PB RADIATION THERAPY PLAN SIMPLE  2006   • CATARACT EXTRACTION WITH IOL  2003    bilateral   • APPENDECTOMY  1974   • HYSTERECTOMY, TOTAL ABDOMINAL  1974                           • TONSILLECTOMY AND ADENOIDECTOMY  as child   • VEIN STRIPPING  1974/1999/2013    b/l legs         Exam:   Blood pressure 122/76, pulse 75, temperature 36.8 °C (98.3 °F), height 1.651 m (5' 5\"), weight 54.9 kg (121 lb), SpO2 96 %. Body mass index is 20.14 kg/m².    Hearing excellent.    Dentition good  Alert, oriented in no acute distress.  Eye contact is good, speech goal directed, affect calm      Assessment and Plan. The following treatment and monitoring plan is recommended:    1. Medicare annual wellness visit, subsequent  Initial Wellness Visit - Includes PPPS ()    Chart reviewed and updated   2. Gastroesophageal reflux disease without esophagitis      Stable. Continue current medications; monitor   3. Primary osteoarthritis involving multiple joints      Stable. Monitor   4. Diverticulosis of large intestine without hemorrhage      Stable. Monitor   5. Chronic venous insufficiency      Stable. Monitor   6. Raynaud's " phenomenon without gangrene      Stable. Monitor   7. Essential hypertension      Stable. Continue current medications; monitor       Services suggested: No services needed at this time  Health Care Screening: Age-appropriate preventive services recommended by USPTF and ACIP covered by Medicare were discussed today. Services ordered if indicated and agreed upon by the patient.  Referrals offered: PT/OT/Nutrition counseling/Behavioral Health/Smoking cessation as per orders if indicated.    Discussion today about general wellness and lifestyle habits:    · Prevent falls and reduce trip hazards; Cautioned about securing or removing rugs.  · Have a working fire alarm and carbon monoxide detector;   · Engage in regular physical activity and social activities     Follow-up: Return in about 6 months (around 11/4/2018) for HTN check, Short.

## 2018-05-18 ENCOUNTER — HOSPITAL ENCOUNTER (OUTPATIENT)
Dept: LAB | Facility: MEDICAL CENTER | Age: 78
End: 2018-05-18
Attending: FAMILY MEDICINE
Payer: MEDICARE

## 2018-05-18 DIAGNOSIS — E78.5 DYSLIPIDEMIA: ICD-10-CM

## 2018-05-18 DIAGNOSIS — I10 ESSENTIAL HYPERTENSION: ICD-10-CM

## 2018-05-18 LAB
ALBUMIN SERPL BCP-MCNC: 4.1 G/DL (ref 3.2–4.9)
ALBUMIN/GLOB SERPL: 1.6 G/DL
ALP SERPL-CCNC: 79 U/L (ref 30–99)
ALT SERPL-CCNC: 12 U/L (ref 2–50)
ANION GAP SERPL CALC-SCNC: 7 MMOL/L (ref 0–11.9)
AST SERPL-CCNC: 13 U/L (ref 12–45)
BILIRUB SERPL-MCNC: 0.7 MG/DL (ref 0.1–1.5)
BUN SERPL-MCNC: 20 MG/DL (ref 8–22)
CALCIUM SERPL-MCNC: 9.3 MG/DL (ref 8.5–10.5)
CHLORIDE SERPL-SCNC: 105 MMOL/L (ref 96–112)
CHOLEST SERPL-MCNC: 169 MG/DL (ref 100–199)
CO2 SERPL-SCNC: 29 MMOL/L (ref 20–33)
CREAT SERPL-MCNC: 0.68 MG/DL (ref 0.5–1.4)
CREAT UR-MCNC: 67.7 MG/DL
GLOBULIN SER CALC-MCNC: 2.6 G/DL (ref 1.9–3.5)
GLUCOSE SERPL-MCNC: 93 MG/DL (ref 65–99)
HDLC SERPL-MCNC: 89 MG/DL
LDLC SERPL CALC-MCNC: 63 MG/DL
MICROALBUMIN UR-MCNC: 1.9 MG/DL
MICROALBUMIN/CREAT UR: 28 MG/G (ref 0–30)
POTASSIUM SERPL-SCNC: 4 MMOL/L (ref 3.6–5.5)
PROT SERPL-MCNC: 6.7 G/DL (ref 6–8.2)
SODIUM SERPL-SCNC: 141 MMOL/L (ref 135–145)
TRIGL SERPL-MCNC: 83 MG/DL (ref 0–149)

## 2018-05-18 PROCEDURE — 82043 UR ALBUMIN QUANTITATIVE: CPT

## 2018-05-18 PROCEDURE — 36415 COLL VENOUS BLD VENIPUNCTURE: CPT

## 2018-05-18 PROCEDURE — 80061 LIPID PANEL: CPT

## 2018-05-18 PROCEDURE — 80053 COMPREHEN METABOLIC PANEL: CPT

## 2018-05-18 PROCEDURE — 82570 ASSAY OF URINE CREATININE: CPT

## 2018-11-30 ENCOUNTER — OFFICE VISIT (OUTPATIENT)
Dept: MEDICAL GROUP | Facility: PHYSICIAN GROUP | Age: 78
End: 2018-11-30
Payer: MEDICARE

## 2018-11-30 VITALS
SYSTOLIC BLOOD PRESSURE: 138 MMHG | HEIGHT: 65 IN | BODY MASS INDEX: 20.49 KG/M2 | HEART RATE: 64 BPM | WEIGHT: 123 LBS | TEMPERATURE: 98.5 F | OXYGEN SATURATION: 97 % | DIASTOLIC BLOOD PRESSURE: 78 MMHG

## 2018-11-30 DIAGNOSIS — I10 ESSENTIAL HYPERTENSION: ICD-10-CM

## 2018-11-30 DIAGNOSIS — J30.2 SEASONAL ALLERGIES: ICD-10-CM

## 2018-11-30 DIAGNOSIS — M19.90 ARTHRITIS: Primary | ICD-10-CM

## 2018-11-30 DIAGNOSIS — I87.2 CHRONIC VENOUS INSUFFICIENCY: ICD-10-CM

## 2018-11-30 DIAGNOSIS — K21.9 GASTROESOPHAGEAL REFLUX DISEASE WITHOUT ESOPHAGITIS: ICD-10-CM

## 2018-11-30 PROCEDURE — 99214 OFFICE O/P EST MOD 30 MIN: CPT | Performed by: FAMILY MEDICINE

## 2018-11-30 RX ORDER — ACETAMINOPHEN, DEXTROMETHORPHAN HYDROBROMIDE, DOXYLAMINE SUCCINATE, PHENYLEPHRINE HYDROCHLORIDE 650; 20; 12.5; 1 MG/30ML; MG/30ML; MG/30ML; MG/30ML
1 SOLUTION ORAL DAILY
Status: ON HOLD | COMMUNITY
End: 2019-10-17

## 2018-11-30 RX ORDER — FLUTICASONE PROPIONATE 50 MCG
1 SPRAY, SUSPENSION (ML) NASAL DAILY
COMMUNITY
End: 2018-11-30 | Stop reason: SDUPTHER

## 2018-11-30 RX ORDER — FLUTICASONE PROPIONATE 50 MCG
1 SPRAY, SUSPENSION (ML) NASAL DAILY
Qty: 1 BOTTLE | Refills: 1 | Status: SHIPPED | OUTPATIENT
Start: 2018-11-30 | End: 2019-05-06 | Stop reason: SDUPTHER

## 2018-11-30 RX ORDER — ERGOCALCIFEROL 1.25 MG/1
50000 CAPSULE ORAL
COMMUNITY
End: 2020-06-11

## 2018-11-30 ASSESSMENT — PATIENT HEALTH QUESTIONNAIRE - PHQ9: CLINICAL INTERPRETATION OF PHQ2 SCORE: 0

## 2018-11-30 NOTE — PROGRESS NOTES
CC:  The primary encounter diagnosis was Arthritis. Diagnoses of Seasonal allergies, Chronic venous insufficiency, Essential hypertension, and Gastroesophageal reflux disease without esophagitis were also pertinent to this visit.    HISTORY OF THE PRESENT ILLNESS: Patient is a 78 y.o. female. This pleasant patient is here today to establish primary care provider.    Health Maintenance: Declines vaccines today      Arthritis  Chronic ongoing medical condition.  Patient presents with bilateral hand pain in her MCP joints and mild ulnar deviation of all digits.  Patient also presents with raynauds phenomenon and family history of scleroderma/crest syndrome.  Patient very concerned today about possibility of rheumatoid arthritis.  Reviewed previous labs for solitary test of rheumatoid arthritis factor in 2014 that was negative, no evidence of anti-CCP laboratory test.  To confirm osteo-versus rheumatoid arthritis.    Chronic venous insufficiency  Chronic stable medical condition.  Chronic venous insufficiency supporting bilateral lower extremity edema.  Patient using bilateral compression stockings with good relief.    Hypertension  Chronic stable medical condition.  Currently well controlled with lisinopril 10 mg daily.    Gastroesophageal reflux disease without esophagitis  Chronic stable medical condition.  Currently well controlled with omeprazole 20 mg daily.     Seasonal allergies  Chronic stable medical condition.  Patient requesting refill of Flonase nasal spray for seasonal allergies related to fall pollens of Janak and rabbitbrush    Allergies: Ibuprofen; Morphine; and Other misc    Current Outpatient Prescriptions Ordered in [facility]   Medication Sig Dispense Refill   • vitamin D, Ergocalciferol, (DRISDOL) 94256 units Cap capsule Take  by mouth every 7 days.     • Probiotic Product (RA PROBIOTIC COMPLEX) Cap Take  by mouth.     • fluticasone (FLONASE) 50 MCG/ACT nasal spray Spray 1 Spray in nose every day. 1  Bottle 1   • lisinopril (PRINIVIL) 10 MG Tab Take 1 Tab by mouth every day. 90 Tab 3   • omeprazole (PRILOSEC) 20 MG delayed-release capsule Take 1 Cap by mouth every day. 90 Cap 3   • Guaifenesin 400 MG Tab Take  by mouth.     • loratadine (CLARITIN) 10 MG TABS Take 10 mg by mouth every day.     • Multiple Vitamins-Minerals (MULTIVITAMIN PO) Take  by mouth every day.       No current [facility]-ordered facility-administered medications on file.        Past Medical History:   Diagnosis Date   • Anesthesia 1974/2002    PONV   • Arthritis    • Breast cancer (HCC)    • Cancer (HCC) 2006    right breast    • CATARACT     surgical correction bilateral   • Dental disorder     bridge upper front; 5/7/2015 dental extraction with infection; temporary right lower   • Diverticulitis    • GERD (gastroesophageal reflux disease)    • Heart burn 12/17/13   • Hypertension    • Indigestion    • MEDICAL HOME 6/9/2015   • Osteoporosis    • Pain     chronic pain knees; ankles, feet, right shoulder, arm and hand   • Personal history of venous thrombosis and embolism 12/31/2013    leg   • Pneumonia 1984   • PVD (peripheral vascular disease) ([facility])    • Unspecified hemorrhagic conditions     had GI bleed diverticulitis       Past Surgical History:   Procedure Laterality Date   • KNEE ARTHROPLASTY TOTAL Right 6/8/2015    Procedure: KNEE ARTHROPLASTY TOTAL;  Surgeon: Markus York M.D.;  Location: [facility];  Service:    • GASTROSCOPY WITH BIOPSY  6/16/2014    Performed by Dino Pavon M.D. at [facility]   • EGD W/ENDOSCOPIC ULTRASOUND  6/16/2014    Performed by Dino Pavon M.D. at [facility]   • KNEE ARTHROPLASTY TOTAL  12/30/2013    left; Performed by Markus York M.D. at [facility]   • COLONOSCOPY - ENDO  12/6/2013    Performed by Markus Juarez D.O. at ENDOSCOPY [facility]   • LUMPECTOMY  2006    right breast   • PB RADIATION THERAPY PLAN SIMPLE  2006   •  "CATARACT EXTRACTION WITH IOL  2003    bilateral   • APPENDECTOMY  1974   • HYSTERECTOMY, TOTAL ABDOMINAL  1974                           • TONSILLECTOMY AND ADENOIDECTOMY  as child   • VEIN STRIPPING  1974/1999/2013    b/l legs       Social History   Substance Use Topics   • Smoking status: Never Smoker   • Smokeless tobacco: Never Used   • Alcohol use No       Social History     Social History Narrative   • No narrative on file       Family History   Problem Relation Age of Onset   • Heart Disease Mother    • Hypertension Mother    • Stroke Mother    • Diabetes Mother    • Dementia Mother    • Arthritis Mother    • Diabetes Brother    • Dementia Brother    • Cancer Father         Colon   • Heart Disease Maternal Grandmother    • Kidney Disease Maternal Grandmother    • Heart Attack Maternal Grandfather    • No Known Problems Brother    • Hypertension Brother    • No Known Problems Brother        ROS:   Denies chest pain and shortness of breath    Exam: Blood pressure 138/78, pulse 64, temperature 36.9 °C (98.5 °F), temperature source Temporal, height 1.651 m (5' 5\"), weight 55.8 kg (123 lb), SpO2 97 %, not currently breastfeeding. Body mass index is 20.47 kg/m².    GENERAL: No acute distress, thin  HENT: Atraumatic, normocephalic, external auditory canals clear bilaterally, TMs translucent and pearly gray, nares clear without discharge, posterior pharynx clear without erythema or exudates.  EYES: Extraocular movements intact, pupils equal and reactive to light.  NECK: Supple, FROM  CHEST: No deformities, Equal chest expansion, no murmurs, gallops, or rubs.  RESP: Unlabored, no stridor or audible wheeze.  No wheezes, crackles, nor rhonchi  ABD: Soft, Nontender, Non-Distended.  Normal bowel sounds.  No hepatosplenomegaly  Extremities: No Clubbing, Cyanosis, 3+ bilateral lower extremity edema.  Ulnar deviation of fingers on bilateral hands.  Skin: Warm/dry, bilateral Raynauds phenomenon in the hands  Neuro: A/O x 4, " CN 2-12 Grossly intact, Motor/sensory grosly intact  Psych: Normal behavior, normal affect      Lab review:  labs are reviewed, up to date and normal.  2014 results of artery testing negative.      Assessment/Plan:  1. Arthritis  Chronic stable medical condition.  Given family history of autoimmune illness and personal history of Raynauds phenomenon as well as physical exam findings of ulnar deviation we will examine again for rheumatoid arthritis..  Labs as below.  - RHEUMATOID ARTHRITIS FACTOR; Future  - CCP; Future    2. Seasonal allergies  Chronic stable medical condition please continue fluticasone as below.  - fluticasone (FLONASE) 50 MCG/ACT nasal spray; Spray 1 Spray in nose every day.  Dispense: 1 Bottle; Refill: 1    3. Chronic venous insufficiency  Chronic stable medical condition.  Please continue compression stockings as you have been.    4. Essential hypertension  Chronic stable medical condition please continue lisinopril.    5. Gastroesophageal reflux disease without esophagitis  Chronic stable medical condition please continue omeprazole.      Please note that this dictation was created using voice recognition software. I have made every reasonable attempt to correct obvious errors, but I expect that there are errors of grammar and possibly content that I did not discover before finalizing the note.

## 2018-11-30 NOTE — ASSESSMENT & PLAN NOTE
Chronic stable medical condition.  Patient requesting refill of Flonase nasal spray for seasonal allergies related to fall pollens of Jan[place] and rabbitbrush

## 2018-11-30 NOTE — ASSESSMENT & PLAN NOTE
Chronic stable medical condition.  Chronic venous insufficiency supporting bilateral lower extremity edema.  Patient using bilateral compression stockings with good relief.

## 2018-11-30 NOTE — ASSESSMENT & PLAN NOTE
Chronic ongoing medical condition.  Patient presents with bilateral hand pain in her MCP joints and mild ulnar deviation of all digits.  Patient also presents with raynauds phenomenon and family history of scleroderma/crest syndrome.  Patient very concerned today about possibility of rheumatoid arthritis.  Reviewed previous labs for solitary test of rheumatoid arthritis factor in 2014 that was negative, no evidence of anti-CCP laboratory test.  To confirm osteo-versus rheumatoid arthritis.

## 2018-12-04 ENCOUNTER — HOSPITAL ENCOUNTER (OUTPATIENT)
Dept: LAB | Facility: MEDICAL CENTER | Age: 78
End: 2018-12-04
Attending: INTERNAL MEDICINE
Payer: MEDICARE

## 2018-12-04 ENCOUNTER — HOSPITAL ENCOUNTER (OUTPATIENT)
Dept: LAB | Facility: MEDICAL CENTER | Age: 78
End: 2018-12-04
Attending: FAMILY MEDICINE
Payer: MEDICARE

## 2018-12-04 DIAGNOSIS — M19.90 ARTHRITIS: ICD-10-CM

## 2018-12-04 LAB
BUN SERPL-MCNC: 19 MG/DL (ref 8–22)
CREAT SERPL-MCNC: 0.9 MG/DL (ref 0.5–1.4)
RHEUMATOID FACT SER IA-ACNC: <10 IU/ML (ref 0–14)

## 2018-12-04 PROCEDURE — 36415 COLL VENOUS BLD VENIPUNCTURE: CPT

## 2018-12-04 PROCEDURE — 84520 ASSAY OF UREA NITROGEN: CPT

## 2018-12-04 PROCEDURE — 86431 RHEUMATOID FACTOR QUANT: CPT

## 2018-12-04 PROCEDURE — 86200 CCP ANTIBODY: CPT

## 2018-12-04 PROCEDURE — 82565 ASSAY OF CREATININE: CPT

## 2018-12-07 LAB — CCP IGG SERPL-ACNC: 4 UNITS (ref 0–19)

## 2018-12-11 ENCOUNTER — OFFICE VISIT (OUTPATIENT)
Dept: MEDICAL GROUP | Facility: PHYSICIAN GROUP | Age: 78
End: 2018-12-11
Payer: MEDICARE

## 2018-12-11 VITALS
HEART RATE: 80 BPM | RESPIRATION RATE: 14 BRPM | WEIGHT: 125 LBS | OXYGEN SATURATION: 98 % | DIASTOLIC BLOOD PRESSURE: 80 MMHG | BODY MASS INDEX: 20.83 KG/M2 | TEMPERATURE: 98.8 F | SYSTOLIC BLOOD PRESSURE: 138 MMHG | HEIGHT: 65 IN

## 2018-12-11 DIAGNOSIS — Z87.42 HX OF VAGINAL BLEEDING: ICD-10-CM

## 2018-12-11 DIAGNOSIS — K57.30 DIVERTICULOSIS OF LARGE INTESTINE WITHOUT HEMORRHAGE: ICD-10-CM

## 2018-12-11 PROCEDURE — 99214 OFFICE O/P EST MOD 30 MIN: CPT | Performed by: NURSE PRACTITIONER

## 2018-12-11 RX ORDER — ESTRADIOL 0.1 MG/G
CREAM VAGINAL
Qty: 1 TUBE | Refills: 6 | Status: SHIPPED | OUTPATIENT
Start: 2018-12-11 | End: 2019-12-30

## 2018-12-11 NOTE — ASSESSMENT & PLAN NOTE
Followed by Dr. Singh.  Has CT of abdomen tomorrow for pancreatic cyst.   Last colonoscopy showed diverticulosis in 2016.  No fever, chills, blood in stool reported   
Reports that in 2009 she had some vaginal bleeding.  Had hysterectomy at age 34.  Was seen at that time and they felt that it was was due to diverticulitis.  Most recent colonoscopy in 2016 and this was normal.    Reports that she had a recent episode of vaginal bleeding right before thanksgiving.  One episode of bleeding and then it stopped.  Patient does reports having hemorrhoids in the past. No recent bleeding from them.  No burning or pain with urination. Reports some occasional abdominal pain with gas passing through vagina.   
Xanax 1 mg po prn  Clonazepam 1 mg po prn  ?Thorazine   ?mg po prn  (Pt notes another medication has been rx'ed, but she cannot recall the name)

## 2018-12-11 NOTE — PROGRESS NOTES
Chief Complaint   Patient presents with   • Vaginal Bleeding   • Diverticulosis       Subjective:   Irina Fuller is a 78 y.o. female  Patient of Dr. Manzo here today for evaluation and management of:    Hx of vaginal bleeding  Reports that in 2009 she had some vaginal bleeding.  Had hysterectomy at age 34.  Was seen at that time and they felt that it was was due to diverticulitis.  Most recent colonoscopy in 2016 and this was normal.    Reports that she had a recent episode of vaginal bleeding right before thanksgiving.  One episode of bleeding and then it stopped.  Patient does reports having hemorrhoids in the past. No recent bleeding from them.  No burning or pain with urination. Reports some occasional abdominal pain with gas passing through vagina.     Diverticulosis  Followed by Dr. Singh.  Has CT of abdomen tomorrow for pancreatic cyst.   Last colonoscopy showed diverticulosis in 2016.  No fever, chills, blood in stool reported            Current medicines (including changes today)  Current Outpatient Prescriptions   Medication Sig Dispense Refill   • estradiol (ESTRACE) 0.1 MG/GM vaginal cream Use 1 gram vaginally 3 times week 1 Tube 6   • Probiotic Product (RA PROBIOTIC COMPLEX) Cap Take  by mouth.     • vitamin D, Ergocalciferol, (DRISDOL) 27564 units Cap capsule Take  by mouth every 7 days.     • fluticasone (FLONASE) 50 MCG/ACT nasal spray Spray 1 Spray in nose every day. 1 Bottle 1   • lisinopril (PRINIVIL) 10 MG Tab Take 1 Tab by mouth every day. 90 Tab 3   • omeprazole (PRILOSEC) 20 MG delayed-release capsule Take 1 Cap by mouth every day. 90 Cap 3   • Guaifenesin 400 MG Tab Take  by mouth.     • loratadine (CLARITIN) 10 MG TABS Take 10 mg by mouth every day.     • Multiple Vitamins-Minerals (MULTIVITAMIN PO) Take  by mouth every day.       No current facility-administered medications for this visit.      She  has a past medical history of Anesthesia (1974/2002); Arthritis; Breast cancer (HCC);  "Cancer ([facility]) (2006); CATARACT; Dental disorder; Diverticulitis; GERD (gastroesophageal reflux disease); Heart burn (12/17/13); Hypertension; Indigestion; MEDICAL HOME (6/9/2015); Osteoporosis; Pain; Personal history of venous thrombosis and embolism (12/31/2013); Pneumonia (1984); PVD (peripheral vascular disease) ([facility]); and Unspecified hemorrhagic conditions.    ROS as stated in hpi  No chest pain, no shortness of breath, no abdominal pain       Objective:     Blood pressure 138/80, pulse 80, temperature 37.1 °C (98.8 °F), temperature source Temporal, resp. rate 14, height 1.651 m (5' 5\"), weight 56.7 kg (125 lb), SpO2 98 %, not currently breastfeeding. Body mass index is 20.8 kg/m². stable.   Physical Exam:  Constitutional: Alert, no distress.  Skin: Warm, dry, good turgor,no cyanosis, no rashes in visible areas.  Eye: Equal, round and reactive, conjunctiva clear, lids normal.  Ears: No tenderness, no discharge.  External canals are without any significant edema or erythema. Gross auditory acuity is intact.  Nose: symmetrical without tenderness, no discharge.  Mouth/Throat: lips without lesion.  Oropharynx clear.   Neck: Trachea midline, no masses, no obvious thyroid enlargement..Range of motion within normal limits.  Neuro: Cranial nerves 2-12 grossly intact.  No sensory deficit.  Respiratory: Unlabored respiratory effort, lungs clear to auscultation, no wheezes, no ronchi.  Cardiovascular: Normal S1, S2, no murmur, no edema.  Abdomen: Soft, non-tender, no masses, no guarding,  no hepatosplenomegaly.  GYN:  No reported bleeding at this time.  Reports vaginal dryness  Psych: Alert and oriented x3, normal affect and mood and judgement.        Assessment and Plan:   The following treatment plan was discussed    1. Hx of vaginal bleeding  This is a new problem to me.  Chronic, acute exacerbation.  Transvaginal ultrasound ordered.  Monitor results.   - US-PELVIC TRANSVAGINAL ONLY; Future    2. Diverticulosis of large " intestine without hemorrhage  This is a new problem to me. Chronic, ongoing. Followed by Dr. Pavon.  Has upcoming CT of abdomen for pancreatic cyst.       Followup: Return if symptoms worsen or fail to improve.         Educated in proper administration of medication(s) ordered today including safety, possible SE, risks, benefits, rationale and alternatives to therapy.     Please note that this dictation was created using voice recognition software. I have made every reasonable attempt to correct obvious errors, but I expect that there are errors of grammar and possibly content that I did not discover before finalizing the note.

## 2018-12-11 NOTE — Clinical Note
Dr Manzo This patient  Who doesn't have a uterus, presents with intermittent vaginal bleeding.  Ordered transvaginal ultrasound.  I am out of the office starting on December 20th, so someone other than me may be resulting that note.  Just wanted you to be aware. I did not order CBC as she only had one episode in November and then has had it in prior years, but only one spot.

## 2018-12-12 ENCOUNTER — HOSPITAL ENCOUNTER (OUTPATIENT)
Dept: RADIOLOGY | Facility: MEDICAL CENTER | Age: 78
End: 2018-12-12
Attending: INTERNAL MEDICINE
Payer: MEDICARE

## 2018-12-12 DIAGNOSIS — K86.3 CYST AND PSEUDOCYST OF PANCREAS: ICD-10-CM

## 2018-12-12 DIAGNOSIS — K86.2 CYST AND PSEUDOCYST OF PANCREAS: ICD-10-CM

## 2018-12-12 PROCEDURE — 700117 HCHG RX CONTRAST REV CODE 255: Performed by: INTERNAL MEDICINE

## 2018-12-12 PROCEDURE — 74170 CT ABD WO CNTRST FLWD CNTRST: CPT

## 2018-12-12 RX ADMIN — IOHEXOL 100 ML: 350 INJECTION, SOLUTION INTRAVENOUS at 15:37

## 2018-12-13 ENCOUNTER — HOSPITAL ENCOUNTER (OUTPATIENT)
Dept: RADIOLOGY | Facility: MEDICAL CENTER | Age: 78
End: 2018-12-13
Attending: NURSE PRACTITIONER
Payer: MEDICARE

## 2018-12-13 DIAGNOSIS — Z87.42 HX OF VAGINAL BLEEDING: ICD-10-CM

## 2018-12-13 PROCEDURE — 76830 TRANSVAGINAL US NON-OB: CPT

## 2018-12-14 ENCOUNTER — TELEPHONE (OUTPATIENT)
Dept: MEDICAL GROUP | Facility: PHYSICIAN GROUP | Age: 78
End: 2018-12-14

## 2018-12-14 NOTE — LETTER
December 14, 2018         Irinamoisés Fuller  [id number]  [place] [address]        Dear Irina:      Ultrasound did not show any evidence of a mass or lesion that would be causing the bleeding.  Please follow up with Dr. Manzo at your next appointment.         Below are the results from your recent visit:    Resulted Orders   US-PELVIC COMPLETE (TRANSABDOMINAL/TRANSVAGINAL) (COMBO)    Narrative    12/13/2018 3:01 PM    HISTORY/REASON FOR EXAM:  Vaginal Bleeding      TECHNIQUE/EXAM DESCRIPTION:  Transabdominal and transvaginal pelvic ultrasound.    COMPARISON:   None    FINDINGS:  Both transabdominal and transvaginal scanning were performed to optimally visualize the pelvis.    Uterus is absent.  Ovaries not visualized.  No gross adnexal mass.    There is no free fluid seen.      Impression    1.  Limited pelvic ultrasound showing no gross adnexal mass or free fluid.  2.  Prior hysterectomy.  3.  Ovaries not visualized.   If you have any questions or concerns, please don't hesitate to call.    Sincerely,    JUAN DAVID Rangel.     Electronically Signed

## 2018-12-14 NOTE — TELEPHONE ENCOUNTER
----- Message from MUKUL Rangel sent at 12/14/2018  1:53 PM PST -----  Please let patient know that her ultrasound did not show any evidence of a mass or lesion that would be causing the bleeding.  Please follow up with Dr. Manzo at your next appointment.    MUKUL Rangel

## 2019-02-08 ENCOUNTER — HOSPITAL ENCOUNTER (OUTPATIENT)
Dept: LAB | Facility: MEDICAL CENTER | Age: 79
End: 2019-02-08
Attending: FAMILY MEDICINE
Payer: MEDICARE

## 2019-02-08 ENCOUNTER — OFFICE VISIT (OUTPATIENT)
Dept: MEDICAL GROUP | Facility: PHYSICIAN GROUP | Age: 79
End: 2019-02-08
Payer: MEDICARE

## 2019-02-08 VITALS
BODY MASS INDEX: 21.33 KG/M2 | HEIGHT: 65 IN | WEIGHT: 128 LBS | HEART RATE: 79 BPM | TEMPERATURE: 98.3 F | OXYGEN SATURATION: 99 % | SYSTOLIC BLOOD PRESSURE: 148 MMHG | DIASTOLIC BLOOD PRESSURE: 78 MMHG | RESPIRATION RATE: 12 BRPM

## 2019-02-08 DIAGNOSIS — I73.00 RAYNAUD'S PHENOMENON WITHOUT GANGRENE: ICD-10-CM

## 2019-02-08 DIAGNOSIS — F41.9 ANXIETY: ICD-10-CM

## 2019-02-08 DIAGNOSIS — M19.90 ARTHRITIS: ICD-10-CM

## 2019-02-08 DIAGNOSIS — K13.79 MOUTH PAIN: ICD-10-CM

## 2019-02-08 LAB
BASOPHILS # BLD AUTO: 0.6 % (ref 0–1.8)
BASOPHILS # BLD: 0.03 K/UL (ref 0–0.12)
EOSINOPHIL # BLD AUTO: 0.09 K/UL (ref 0–0.51)
EOSINOPHIL NFR BLD: 1.8 % (ref 0–6.9)
ERYTHROCYTE [DISTWIDTH] IN BLOOD BY AUTOMATED COUNT: 40.4 FL (ref 35.9–50)
HCT VFR BLD AUTO: 38.2 % (ref 37–47)
HGB BLD-MCNC: 13.2 G/DL (ref 12–16)
IMM GRANULOCYTES # BLD AUTO: 0.02 K/UL (ref 0–0.11)
IMM GRANULOCYTES NFR BLD AUTO: 0.4 % (ref 0–0.9)
LYMPHOCYTES # BLD AUTO: 0.63 K/UL (ref 1–4.8)
LYMPHOCYTES NFR BLD: 12.6 % (ref 22–41)
MCH RBC QN AUTO: 31.8 PG (ref 27–33)
MCHC RBC AUTO-ENTMCNC: 34.6 G/DL (ref 33.6–35)
MCV RBC AUTO: 92 FL (ref 81.4–97.8)
MONOCYTES # BLD AUTO: 0.59 K/UL (ref 0–0.85)
MONOCYTES NFR BLD AUTO: 11.8 % (ref 0–13.4)
NEUTROPHILS # BLD AUTO: 3.65 K/UL (ref 2–7.15)
NEUTROPHILS NFR BLD: 72.8 % (ref 44–72)
NRBC # BLD AUTO: 0 K/UL
NRBC BLD-RTO: 0 /100 WBC
PLATELET # BLD AUTO: 267 K/UL (ref 164–446)
PMV BLD AUTO: 10.7 FL (ref 9–12.9)
RBC # BLD AUTO: 4.15 M/UL (ref 4.2–5.4)
RHEUMATOID FACT SER IA-ACNC: <10 IU/ML (ref 0–14)
WBC # BLD AUTO: 5 K/UL (ref 4.8–10.8)

## 2019-02-08 PROCEDURE — 86225 DNA ANTIBODY NATIVE: CPT

## 2019-02-08 PROCEDURE — 83516 IMMUNOASSAY NONANTIBODY: CPT

## 2019-02-08 PROCEDURE — 86235 NUCLEAR ANTIGEN ANTIBODY: CPT

## 2019-02-08 PROCEDURE — 85025 COMPLETE CBC W/AUTO DIFF WBC: CPT

## 2019-02-08 PROCEDURE — 36415 COLL VENOUS BLD VENIPUNCTURE: CPT

## 2019-02-08 PROCEDURE — 86200 CCP ANTIBODY: CPT

## 2019-02-08 PROCEDURE — 99214 OFFICE O/P EST MOD 30 MIN: CPT | Performed by: FAMILY MEDICINE

## 2019-02-08 PROCEDURE — 86431 RHEUMATOID FACTOR QUANT: CPT

## 2019-02-08 RX ORDER — AMLODIPINE BESYLATE 5 MG/1
5 TABLET ORAL DAILY
Qty: 90 TAB | Refills: 3 | Status: SHIPPED | OUTPATIENT
Start: 2019-02-08 | End: 2019-03-15

## 2019-02-08 RX ORDER — ALPRAZOLAM 0.25 MG/1
0.25 TABLET ORAL NIGHTLY PRN
Qty: 30 TAB | Refills: 0 | Status: SHIPPED | OUTPATIENT
Start: 2019-02-08 | End: 2019-03-10

## 2019-02-08 RX ORDER — HYDROCODONE BITARTRATE AND ACETAMINOPHEN 5; 325 MG/1; MG/1
1-2 TABLET ORAL EVERY 4 HOURS PRN
Qty: 20 TAB | Refills: 0 | Status: SHIPPED | OUTPATIENT
Start: 2019-02-08 | End: 2019-02-15

## 2019-02-08 ASSESSMENT — PATIENT HEALTH QUESTIONNAIRE - PHQ9: CLINICAL INTERPRETATION OF PHQ2 SCORE: 0

## 2019-02-08 NOTE — ASSESSMENT & PLAN NOTE
Chronic ongoing medical condition.  Worsening ray nods of the bilateral feet.  Patient requesting additional help with controlling rainouts of feet.

## 2019-02-08 NOTE — PROGRESS NOTES
CC:Diagnoses of Mouth pain, Anxiety, Raynaud's phenomenon without gangrene, and Arthritis were pertinent to this visit.      HISTORY OF PRESENT ILLNESS: Patient is a 78 y.o. female established patient who presents today to discuss pain, Raynaud's phenomenon, and anxiety.    Anxiety  Chronic ongoing medical condition.  Patient presents for refill of her alprazolam that she uses for social anxiety that she gets when flying or having occasional panic attacks when shopping/out in public.  Denies any history of depression, denies any suicidality.    Arthritis  Chronic ongoing medical condition.  Patient presents with bilateral hand pain in her MCP joints with mild ulnar deviation of all digits.  Family history of scleroderma and crest syndrome and patient is very concerned about the possibility of rheumatoid arthritis.  Previously worked up for rheumatoid arthritis in 2014 and again in 2018 with a negative rheumatoid arthritis factor and negative anti-CCP antibodies.  More extensive testing requested today.    Raynaud's phenomenon (by history or observed)  Chronic ongoing medical condition.  Worsening ray nods of the bilateral feet.  Patient requesting additional help with controlling rainouts of feet.    Mouth pain  New problem to examiner.  Patient experiencing extreme mouth pain from dental extraction x4 8 days ago and patient was never prescribed pain control beyond ibuprofen.  States that she is having difficulty eating secondary to pain and is requesting something stronger so that she can be pain free enough to sleep and perform daily activities.      Patient Active Problem List    Diagnosis Date Noted   • Raynaud's phenomenon (by history or observed) 02/14/2014     Priority: Medium   • Chronic venous insufficiency 02/14/2014     Priority: Medium   • Hypertension 12/05/2013     Priority: Medium   • Mouth pain 02/08/2019   • Anxiety 02/08/2019   • Hx of vaginal bleeding 12/11/2018   • Seasonal allergies 11/30/2018    • Arthritis 11/30/2018   • Gastroesophageal reflux disease without esophagitis 02/06/2018   • Osteoarthritis 12/07/2013   • Diverticulosis 12/05/2013      Allergies:Ibuprofen; Morphine; and Other misc    Current Outpatient Prescriptions   Medication Sig Dispense Refill   • Specialty Vitamins Products (VARISAN VITALITY PO) Take  by mouth.     • HYDROcodone-acetaminophen (NORCO) 5-325 MG Tab per tablet Take 1-2 Tabs by mouth every four hours as needed for up to 7 days. 20 Tab 0   • ALPRAZolam (XANAX) 0.25 MG Tab Take 1 Tab by mouth at bedtime as needed for Sleep for up to 30 days. 30 Tab 0   • amLODIPine (NORVASC) 5 MG Tab Take 1 Tab by mouth every day. 90 Tab 3   • estradiol (ESTRACE) 0.1 MG/GM vaginal cream Use 1 gram vaginally 3 times week 1 Tube 6   • vitamin D, Ergocalciferol, (DRISDOL) 65678 units Cap capsule Take  by mouth every 7 days.     • Probiotic Product (RA PROBIOTIC COMPLEX) Cap Take  by mouth.     • fluticasone (FLONASE) 50 MCG/ACT nasal spray Spray 1 Spray in nose every day. 1 Bottle 1   • omeprazole (PRILOSEC) 20 MG delayed-release capsule Take 1 Cap by mouth every day. 90 Cap 3   • Guaifenesin 400 MG Tab Take  by mouth.     • loratadine (CLARITIN) 10 MG TABS Take 10 mg by mouth every day.     • Multiple Vitamins-Minerals (MULTIVITAMIN PO) Take  by mouth every day.       No current facility-administered medications for this visit.        Social History   Substance Use Topics   • Smoking status: Never Smoker   • Smokeless tobacco: Never Used   • Alcohol use No     Social History     Social History Narrative   • No narrative on file       Family History   Problem Relation Age of Onset   • Heart Disease Mother    • Hypertension Mother    • Stroke Mother    • Diabetes Mother    • Dementia Mother    • Arthritis Mother    • Diabetes Brother    • Dementia Brother    • Cancer Father         Colon   • Heart Disease Maternal Grandmother    • Kidney Disease Maternal Grandmother    • Heart Attack Maternal  "Grandfather    • No Known Problems Brother    • Hypertension Brother    • No Known Problems Brother        Review of Systems:    Constitutional: No Fevers, Chills  Eyes: No vision changes  ENT: No hearing changes  Resp: No Shortness of breath  CV: No Chest pain  GI: No Nausea/Vomiting  MSK: No weakness  Neuro: No Headaches  Psych: No Suicidal ideations    All remaining systems reviewed and found to be negative, except as stated above.    Exam:    Blood pressure 148/78, pulse 79, temperature 36.8 °C (98.3 °F), temperature source Temporal, resp. rate 12, height 1.651 m (5' 5\"), weight 58.1 kg (128 lb), SpO2 99 %, not currently breastfeeding. Body mass index is 21.3 kg/m².    General:  Well nourished, well developed female in NAD  HENT: Atraumatic, normocephalic.  Well-healing eschars from recent dental extraction left lower mandible.  EYES: Extraocular movements intact, pupils equal and reactive to light  NECK: Supple, FROM  CHEST: No deformities, Equal chest expansion  RESP: Unlabored, no stridor or audible wheeze  ABD: Soft, Nontender, Non-Distended  Extremities: No Clubbing, positive cyanosis of bilateral feet.  Palpable pulses distally 2+.  Ulnar deviation of digits 2 through 5 bilaterally on hands with decreased  strength secondary to pain.  Skin: Warm/dry, without rashes  Neuro: A/O x 4, CN 2-12 Grossly intact, Motor/sensory grossly intact  Psych: Normal behavior, normal affect    Assessment/Plan:  1. Mouth pain  New problem to examiner.  Pain medication as below.  Consent for controlled substances obtained.  Follow-up in 1-2 months.  - HYDROcodone-acetaminophen (NORCO) 5-325 MG Tab per tablet; Take 1-2 Tabs by mouth every four hours as needed for up to 7 days.  Dispense: 20 Tab; Refill: 0  - Consent for Opiate Prescription    2. Anxiety  Chronic ongoing medical condition.  Alprazolam as below.  Drug screen and controlled substance agreement as below.  Follow-up as needed.  - ALPRAZolam (XANAX) 0.25 MG Tab; " Take 1 Tab by mouth at bedtime as needed for Sleep for up to 30 days.  Dispense: 30 Tab; Refill: 0  - [facility] SCREEN; Future  - Controlled Substance Treatment Agreement    3. Raynaud's phenomenon without gangrene  Chronic ongoing medical condition.  Discontinuing lisinopril for amlodipine to help with ray nods.  Follow-up in 1-2 months for reevaluation of blood pressure control.  Labs as below for autoimmune issues given constellation of arthritis, family history, and Raynaud's.  - amLODIPine (NORVASC) 5 MG Tab; Take 1 Tab by mouth every day.  Dispense: 90 Tab; Refill: 3  - CCP; Future  - RHEUMATOID ARTHRITIS FACTOR; Future  - ELENA COMPREHENSIVE PANEL  - CBC WITH DIFFERENTIAL; Future  - DX-JOINT SURVEY-HANDS SINGLE VIEW; Future    4. Arthritis  Chronic ongoing medical condition.  Labs as below.  Follow-up in 1-2 months.  - CCP; Future  - RHEUMATOID ARTHRITIS FACTOR; Future  - ELENA COMPREHENSIVE PANEL  - CBC WITH DIFFERENTIAL; Future  - DX-JOINT SURVEY-HANDS SINGLE VIEW; Future

## 2019-02-08 NOTE — ASSESSMENT & PLAN NOTE
New problem to examiner.  Patient experiencing extreme mouth pain from dental extraction x4 8 days ago and patient was never prescribed pain control beyond ibuprofen.  States that she is having difficulty eating secondary to pain and is requesting something stronger so that she can be pain free enough to sleep and perform daily activities.

## 2019-02-08 NOTE — ASSESSMENT & PLAN NOTE
Chronic ongoing medical condition.  Patient presents with bilateral hand pain in her MCP joints with mild ulnar deviation of all digits.  Family history of scleroderma and crest syndrome and patient is very concerned about the possibility of rheumatoid arthritis.  Previously worked up for rheumatoid arthritis in 2014 and again in 2018 with a negative rheumatoid arthritis factor and negative anti-CCP antibodies.  More extensive testing requested today.

## 2019-02-08 NOTE — ASSESSMENT & PLAN NOTE
Chronic ongoing medical condition.  Patient presents for refill of her alprazolam that she uses for social anxiety that she gets when flying or having occasional panic attacks when shopping/out in public.  Denies any history of depression, denies any suicidality.

## 2019-02-10 LAB
CCP IGG SERPL-ACNC: 4 UNITS (ref 0–19)
CENTROMERE IGG TITR SER IF: 0 AU/ML (ref 0–40)
DSDNA AB TITR SER CLIF: NORMAL {TITER}
ENA JO1 AB TITR SER: 0 AU/ML (ref 0–40)
ENA SCL70 IGG SER QL: 3 AU/ML (ref 0–40)
ENA SM IGG SER-ACNC: 0 AU/ML (ref 0–40)
ENA SS-B IGG SER IA-ACNC: 0 AU/ML (ref 0–40)
SSA52 R0ENA AB IGG Q0420: 5 AU/ML (ref 0–40)
SSA60 R0ENA AB IGG Q0419: 0 AU/ML (ref 0–40)
U1 SNRNP IGG SER QL: 0 AU/ML (ref 0–40)

## 2019-02-11 ENCOUNTER — TELEPHONE (OUTPATIENT)
Dept: MEDICAL GROUP | Facility: PHYSICIAN GROUP | Age: 79
End: 2019-02-11

## 2019-02-11 LAB — CHROMATIN IGG SERPL-ACNC: 4 UNITS (ref 0–19)

## 2019-02-12 NOTE — TELEPHONE ENCOUNTER
VOICEMAIL  1. Caller Name:  Emelia at [facility]                       Call Back Number: [phone]    2. Message: Emelia for [facility] is calling regarding pt's Rx for Norco. She states that the pt's profile there has her allergic to Morphine related medications, oxycodone, tramadol and codeine. She states she could have a cross sensitivity to the Hydrocodone. Please advise.         3. Patient approves office to leave a detailed voicemail/rateGeniushart message: N\A

## 2019-02-12 NOTE — TELEPHONE ENCOUNTER
Called pharmacy.  No answer.  Possible interaction discussed with patient and patient is aware of risks and agreed to prescription.

## 2019-03-15 ENCOUNTER — OFFICE VISIT (OUTPATIENT)
Dept: MEDICAL GROUP | Facility: PHYSICIAN GROUP | Age: 79
End: 2019-03-15
Payer: MEDICARE

## 2019-03-15 VITALS
HEART RATE: 72 BPM | HEIGHT: 65 IN | TEMPERATURE: 98 F | BODY MASS INDEX: 20.83 KG/M2 | SYSTOLIC BLOOD PRESSURE: 136 MMHG | DIASTOLIC BLOOD PRESSURE: 82 MMHG | WEIGHT: 125 LBS | OXYGEN SATURATION: 97 %

## 2019-03-15 DIAGNOSIS — I77.811 ECTATIC ABDOMINAL AORTA (HCC): ICD-10-CM

## 2019-03-15 DIAGNOSIS — I73.00 RAYNAUD'S PHENOMENON WITHOUT GANGRENE: ICD-10-CM

## 2019-03-15 DIAGNOSIS — I10 ESSENTIAL HYPERTENSION: ICD-10-CM

## 2019-03-15 PROBLEM — Z87.42 HX OF VAGINAL BLEEDING: Status: RESOLVED | Noted: 2018-12-11 | Resolved: 2019-03-15

## 2019-03-15 PROCEDURE — 99213 OFFICE O/P EST LOW 20 MIN: CPT | Performed by: NURSE PRACTITIONER

## 2019-03-15 PROCEDURE — 8041 PR SCP AHA: Performed by: NURSE PRACTITIONER

## 2019-03-15 RX ORDER — LISINOPRIL 10 MG/1
10 TABLET ORAL DAILY
Qty: 90 TAB | Refills: 0 | Status: SHIPPED | OUTPATIENT
Start: 2019-03-15 | End: 2019-03-18 | Stop reason: SDUPTHER

## 2019-03-15 NOTE — ASSESSMENT & PLAN NOTE
She reports changing to amlodipine in hopes of helping her Raynaud's, but there has been no improvement

## 2019-03-15 NOTE — ASSESSMENT & PLAN NOTE
Ongoing problem.  Patient has been well controlled on lisinopril 10 mg daily, though with coexisting diagnosis of Raynaud's, she was switched to amlodipine. She has been on this now for about a month and has developed LE edema.

## 2019-03-15 NOTE — PROGRESS NOTES
Subjective:     Chief Complaint   Patient presents with   • Hypertension     bp med change/leg swelling on legs and knee        HPI  Irina Fuller is a 78 y.o. female here today for HTN med discussion     Hypertension  Ongoing problem.  Patient has been well controlled on lisinopril 10 mg daily, though with coexisting diagnosis of Raynaud's, she was switched to amlodipine. She has been on this now for about a month and has developed LE edema.     Raynaud's phenomenon (by history or observed)  She reports changing to amlodipine in hopes of helping her Raynaud's, but there has been no improvement    Ectatic abdominal aorta (HCC)  CT 12/12/18 revealed ectatic abdominal aorta.       Annual Health Assessment Questions:    1.  Are you currently engaging in any exercise or physical activity? Yes    2.  How would you describe your mood or emotional well-being today? good    3.  Have you had any falls in the last year? No    4.  Have you noticed any problems with your balance or had difficulty walking? No    5.  In the last six months have you experienced any leakage of urine? No    6. DPA/Advanced Directive: Patient has Living Will on file.    Diagnoses of Essential hypertension, Raynaud's phenomenon without gangrene, and Ectatic abdominal aorta (HCC) were pertinent to this visit.    Allergies: Ibuprofen; Morphine; and Other misc  Current medicines (including changes today)  Current Outpatient Prescriptions   Medication Sig Dispense Refill   • lisinopril (PRINIVIL) 10 MG Tab Take 1 Tab by mouth every day. 90 Tab 0   • Specialty Vitamins Products (VARISAN VITALITY PO) Take  by mouth.     • estradiol (ESTRACE) 0.1 MG/GM vaginal cream Use 1 gram vaginally 3 times week 1 Tube 6   • vitamin D, Ergocalciferol, (DRISDOL) 69497 units Cap capsule Take  by mouth every 7 days.     • Probiotic Product (RA PROBIOTIC COMPLEX) Cap Take  by mouth.     • fluticasone (FLONASE) 50 MCG/ACT nasal spray Spray 1 Spray in nose every day. 1  "Bottle 1   • omeprazole (PRILOSEC) 20 MG delayed-release capsule Take 1 Cap by mouth every day. 90 Cap 3   • Guaifenesin 400 MG Tab Take  by mouth.     • loratadine (CLARITIN) 10 MG TABS Take 10 mg by mouth every day.     • Multiple Vitamins-Minerals (MULTIVITAMIN PO) Take  by mouth every day.       No current facility-administered medications for this visit.        She  has a past medical history of Anesthesia (1974/2002); Arthritis; Breast cancer ([facility]); Cancer ([facility]) (2006); CATARACT; Dental disorder; Diverticulitis; GERD (gastroesophageal reflux disease); Heart burn (12/17/13); Hypertension; Indigestion; MEDICAL HOME (6/9/2015); Osteoporosis; Pain; Personal history of venous thrombosis and embolism (12/31/2013); Pneumonia (1984); PVD (peripheral vascular disease) ([facility]); and Unspecified hemorrhagic conditions.        ROS  As stated in HPI and additionally  CV: +LE edema. no chest pain, [place], PND  Pulm: No sob or dyspnea     Objective:     Blood pressure 136/82, pulse 72, temperature 36.7 °C (98 °F), temperature source Temporal, height 1.651 m (5' 5\"), weight 56.7 kg (125 lb), SpO2 97 %, not currently breastfeeding. Body mass index is 20.8 kg/m².  Physical Exam:  General: Alert, oriented, in no acute distress.  Eye contact is good, speech goal directed, affect calm  CNs grossly intact.  HEENT: conjunctiva non-injected, sclera non-icteric, EOMs intact  Gross hearing intact.  Ext: LLE 1+ pitting edema, RLE trace edema. Erythematous discoloration L>R foot. Warm to touch with cap refill <3 seconds   Gait steady.     Assessment and Plan:   Assessment/Plan:  1. Essential hypertension  Stable, but with medication side effect. Switch back to lisinopril 10 mg daily.     2. Raynaud's phenomenon without gangrene  Stable. Failed trial of amlodipine. Monitor.     3. Ectatic abdominal aorta (HCC)  Stable. Monitor annually with ultrasound       Follow up:  Return in about 3 months (around 6/15/2019).    Educated in proper " administration of medication(s) ordered today including safety, possible SE, risks, benefits, rationale and alternatives to therapy.   Supportive care, differential diagnoses, and indications for immediate follow-up discussed with patient.    Pathogenesis of diagnosis discussed including typical length and natural progression.    Instructed to return to clinic or nearest emergency department for any change in condition, further concerns, or worsening of symptoms.  Patient states understanding of the plan of care and discharge instructions.      Please note that this dictation was created using voice recognition software. I have made every reasonable attempt to correct obvious errors, but I expect that there are errors of grammar and possibly content that I did not discover before finalizing the note.    Followup: Return in about 3 months (around 6/15/2019). sooner should new symptoms or problems arise.

## 2019-03-18 RX ORDER — LISINOPRIL 10 MG/1
10 TABLET ORAL DAILY
Qty: 100 TAB | Refills: 3 | Status: SHIPPED | OUTPATIENT
Start: 2019-03-18 | End: 2019-05-24

## 2019-03-18 NOTE — TELEPHONE ENCOUNTER
Was the patient seen in the last year in this department? Yes LOV 03/15/19 W/ [facility]    Does patient have an active prescription for medications requested? Yes    Received Request Via: Pharmacy   [employer]

## 2019-03-19 ENCOUNTER — HOSPITAL ENCOUNTER (OUTPATIENT)
Dept: RADIOLOGY | Facility: MEDICAL CENTER | Age: 79
End: 2019-03-19
Attending: FAMILY MEDICINE
Payer: MEDICARE

## 2019-03-19 DIAGNOSIS — M19.90 ARTHRITIS: ICD-10-CM

## 2019-03-19 DIAGNOSIS — I73.00 RAYNAUD'S PHENOMENON WITHOUT GANGRENE: ICD-10-CM

## 2019-03-19 PROCEDURE — 77077 JOINT SURVEY SINGLE VIEW: CPT

## 2019-04-09 DIAGNOSIS — K21.9 GASTROESOPHAGEAL REFLUX DISEASE WITHOUT ESOPHAGITIS: ICD-10-CM

## 2019-04-09 RX ORDER — OMEPRAZOLE 20 MG/1
CAPSULE, DELAYED RELEASE ORAL
Qty: 90 CAP | Refills: 0 | Status: SHIPPED | OUTPATIENT
Start: 2019-04-09 | End: 2019-07-08 | Stop reason: SDUPTHER

## 2019-04-09 NOTE — TELEPHONE ENCOUNTER
Was the patient seen in the last year in this department? Yes LOV 03/15/19 TAMIA/ [facility]    Does patient have an active prescription for medications requested? No     Received Request Via: Pharmacy

## 2019-05-06 DIAGNOSIS — J30.2 SEASONAL ALLERGIES: ICD-10-CM

## 2019-05-06 RX ORDER — FLUTICASONE PROPIONATE 50 MCG
SPRAY, SUSPENSION (ML) NASAL
Qty: 16 G | Refills: 0 | Status: SHIPPED | OUTPATIENT
Start: 2019-05-06 | End: 2019-07-19 | Stop reason: SDUPTHER

## 2019-05-24 ENCOUNTER — OFFICE VISIT (OUTPATIENT)
Dept: MEDICAL GROUP | Facility: PHYSICIAN GROUP | Age: 79
End: 2019-05-24
Payer: MEDICARE

## 2019-05-24 VITALS
TEMPERATURE: 98.5 F | HEART RATE: 82 BPM | OXYGEN SATURATION: 98 % | SYSTOLIC BLOOD PRESSURE: 122 MMHG | HEIGHT: 65 IN | DIASTOLIC BLOOD PRESSURE: 82 MMHG | BODY MASS INDEX: 21.66 KG/M2 | WEIGHT: 130 LBS

## 2019-05-24 DIAGNOSIS — I87.2 CHRONIC VENOUS INSUFFICIENCY: ICD-10-CM

## 2019-05-24 DIAGNOSIS — I73.00 RAYNAUD'S PHENOMENON WITHOUT GANGRENE: ICD-10-CM

## 2019-05-24 DIAGNOSIS — I10 ESSENTIAL HYPERTENSION: ICD-10-CM

## 2019-05-24 PROCEDURE — G0439 PPPS, SUBSEQ VISIT: HCPCS | Performed by: FAMILY MEDICINE

## 2019-05-24 RX ORDER — AMLODIPINE BESYLATE 5 MG/1
5 TABLET ORAL DAILY
Qty: 90 TAB | Refills: 3 | Status: SHIPPED | OUTPATIENT
Start: 2019-05-24 | End: 2020-06-03

## 2019-05-24 ASSESSMENT — PATIENT HEALTH QUESTIONNAIRE - PHQ9: CLINICAL INTERPRETATION OF PHQ2 SCORE: 0

## 2019-05-24 ASSESSMENT — ENCOUNTER SYMPTOMS: GENERAL WELL-BEING: GOOD

## 2019-05-24 ASSESSMENT — ACTIVITIES OF DAILY LIVING (ADL): BATHING_REQUIRES_ASSISTANCE: 0

## 2019-05-24 NOTE — PROGRESS NOTES
Chief Complaint   Patient presents with   • Annual Wellness Visit         HPI:  Irina is a 79 y.o. here for Medicare Annual Wellness Visit        Patient Active Problem List    Diagnosis Date Noted   • Raynaud's phenomenon (by history or observed) 02/14/2014     Priority: Medium   • Chronic venous insufficiency 02/14/2014     Priority: Medium   • Hypertension 12/05/2013     Priority: Medium   • Ectatic abdominal aorta (HCC) 03/15/2019   • Mouth pain 02/08/2019   • Anxiety 02/08/2019   • Seasonal allergies 11/30/2018   • Arthritis 11/30/2018   • Gastroesophageal reflux disease without esophagitis 02/06/2018   • Osteoarthritis 12/07/2013   • Diverticulosis 12/05/2013       Current Outpatient Prescriptions   Medication Sig Dispense Refill   • amLODIPine (NORVASC) 5 MG Tab Take 1 Tab by mouth every day. 90 Tab 3   • fluticasone (FLONASE) 50 MCG/ACT nasal spray use 1 spray in each nostril once daily as directed 16 g 0   • omeprazole (PRILOSEC) 20 MG delayed-release capsule TAKE 1 CAPSULE BY MOUTH EVERY DAY 90 Cap 0   • Specialty Vitamins Products (VARISAN VITALITY PO) Take  by mouth.     • estradiol (ESTRACE) 0.1 MG/GM vaginal cream Use 1 gram vaginally 3 times week 1 Tube 6   • vitamin D, Ergocalciferol, (DRISDOL) 65173 units Cap capsule Take  by mouth every 7 days.     • Guaifenesin 400 MG Tab Take  by mouth.     • loratadine (CLARITIN) 10 MG TABS Take 10 mg by mouth every day.     • Multiple Vitamins-Minerals (MULTIVITAMIN PO) Take  by mouth every day.     • Probiotic Product (RA PROBIOTIC COMPLEX) Cap Take  by mouth.       No current facility-administered medications for this visit.         Patient is taking medications as noted in medication list.  Current supplements as per medication list.     Allergies: Ibuprofen; Morphine; and Other misc    Current social contact/activities:  Goes to Denominational with friends, Bible study, lunches,     Is patient current with immunizations? No, due for SHINGRIX (Shingles). Patient  is interested in receiving SHINGRIX (Shingles) today.    She  reports that she has never smoked. She has never used smokeless tobacco. She reports that she does not drink alcohol or use drugs.  Counseling given: Not Answered        DPA/Advanced directive: Patient has Living Will, but it is not on file. Instructed to bring in a copy to scan into their chart.    ROS:    Gait: Uses no assistive device   Ostomy: No   Other tubes: No   Amputations: No   Chronic oxygen use No   Last eye exam 2918  Wears hearing aids: Yes   : Denies any urinary leakage during the last 6 months        Depression Screening    Little interest or pleasure in doing things?  0 - not at all  Feeling down, depressed, or hopeless? 0 - not at all  Patient Health Questionnaire Score: 0    If depressive symptoms identified deferred to follow up visit unless specifically addressed in assessment and plan.    Interpretation of PHQ-9 Total Score   Score Severity   1-4 No Depression   5-9 Mild Depression   10-14 Moderate Depression   15-19 Moderately Severe Depression   20-27 Severe Depression    Screening for Cognitive Impairment    Three Minute Recall (village, kitchen, baby)  3/3 Village kitchen baby  Ming clock face with all 12 numbers and set the hands to show 10 past 10.    10:10  3/5  If cognitive concerns identified, deferred for follow up unless specifically addressed in assessment and plan.    Fall Risk Assessment    Has the patient had two or more falls in the last year or any fall with injury in the last year?  No  If fall risk identified, deferred for follow up unless specifically addressed in assessment and plan.    Safety Assessment    Throw rugs on floor.  Yes  Handrails on all stairs.  Yes  Good lighting in all hallways.  Yes  Difficulty hearing.  No  Patient counseled about all safety risks that were identified.    Functional Assessment ADLs    Are there any barriers preventing you from cooking for yourself or meeting nutritional needs?   No.    Are there any barriers preventing you from driving safely or obtaining transportation?  No.    Are there any barriers preventing you from using a telephone or calling for help?  No.    Are there any barriers preventing you from shopping?  No.    Are there any barriers preventing you from taking care of your own finances?  No.    Are there any barriers preventing you from managing your medications?  No.    Are there any barriers preventing you from showering, bathing or dressing yourself?  No.    Are you currently engaging in any exercise or physical activity?  Yes.  Walk at the [place], walks with friends, exercise bike, feet exercises  What is your perception of your health?  Good.    Health Maintenance Summary                IMM ZOSTER VACCINES Overdue 4/3/1988     Annual Wellness Visit Overdue 5/5/2019      Done 5/4/2018 Visit Dx: Medicare annual wellness visit, subsequent    IMM INFLUENZA Next Due 9/1/2019      Done 2/10/2017 Imm Admin: Influenza Vaccine Adult HD    BONE DENSITY Next Due 10/8/2019      Done 10/8/2014 DS-BONE DENSITY STUDY (DEXA)    COLONOSCOPY Next Due 12/6/2023      Done 12/6/2013 Surg:COLONOSCOPY - ENDO          Patient Care Team:  Jm Manzo M.D. as PCP - General (Family Medicine)  Dino Pavon M.D. as Consulting Physician (Gastroenterology)  JOSESITO CarusoPEDDIE as Consulting Physician (Podiatry)    Social History   Substance Use Topics   • Smoking status: Never Smoker   • Smokeless tobacco: Never Used   • Alcohol use No     Family History   Problem Relation Age of Onset   • Heart Disease Mother    • Hypertension Mother    • Stroke Mother    • Diabetes Mother    • Dementia Mother    • Arthritis Mother    • Diabetes Brother    • Dementia Brother    • Cancer Father         Colon   • Heart Disease Maternal Grandmother    • Kidney Disease Maternal Grandmother    • Heart Attack Maternal Grandfather    • No Known Problems Brother    • Hypertension Brother    • No Known  "Problems Brother      She  has a past medical history of Anesthesia (1974/2002); Arthritis; Breast cancer (HCC); Cancer (HCC) (2006); CATARACT; Dental disorder; Diverticulitis; GERD (gastroesophageal reflux disease); Heart burn (12/17/13); Hypertension; Indigestion; MEDICAL HOME (6/9/2015); Osteoporosis; Pain; Personal history of venous thrombosis and embolism (12/31/2013); Pneumonia (1984); PVD (peripheral vascular disease) ([facility]); and Unspecified hemorrhagic conditions.   Past Surgical History:   Procedure Laterality Date   • KNEE ARTHROPLASTY TOTAL Right 6/8/2015    Procedure: KNEE ARTHROPLASTY TOTAL;  Surgeon: Markus York M.D.;  Location: [facility];  Service:    • GASTROSCOPY WITH BIOPSY  6/16/2014    Performed by Dino Pavon M.D. at [facility]   • EGD W/ENDOSCOPIC ULTRASOUND  6/16/2014    Performed by Dino Pavon M.D. at [facility]   • KNEE ARTHROPLASTY TOTAL  12/30/2013    left; Performed by Markus York M.D. at [facility]   • COLONOSCOPY - ENDO  12/6/2013    Performed by Markus Juraez D.O. at ENDOSCOPY [facility]   • LUMPECTOMY  2006    right breast   • PB RADIATION THERAPY PLAN SIMPLE  2006   • CATARACT EXTRACTION WITH IOL  2003    bilateral   • APPENDECTOMY  1974   • HYSTERECTOMY, TOTAL ABDOMINAL  1974                           • TONSILLECTOMY AND ADENOIDECTOMY  as child   • VEIN STRIPPING  1974/1999/2013    b/l legs           Exam:     /82 (BP Location: Right arm, Patient Position: Sitting, BP Cuff Size: Adult)   Pulse 82   Temp 36.9 °C (98.5 °F) (Temporal)   Ht 1.651 m (5' 5\")   Wt 59 kg (130 lb)   SpO2 98%  Body mass index is 21.63 kg/m².    Hearing good.    Dentition fair  Alert, oriented in no acute distress.  Eye contact is good, speech goal directed, affect calm      Assessment and Plan. The following treatment and monitoring plan is recommended:    1. Essential hypertension  amLODIPine (NORVASC) 5 MG " Tab    CBC WITH DIFFERENTIAL    Comp Metabolic Panel    Lipid Profile   2. Chronic venous insufficiency   counseled on compression stocking use.   3. Raynaud's phenomenon without gangrene   restart amlodipine.  Stop lisinopril.       Patient declines vaccinations today.  Services suggested: No services needed at this time  Health Care Screening recommendations as per orders if indicated.  Referrals offered: PT/OT/Nutrition counseling/Behavioral Health/Smoking cessation as per orders if indicated.    Discussion today about general wellness and lifestyle habits:    · Prevent falls and reduce trip hazards; Cautioned about securing or removing rugs.  · Have a working fire alarm and carbon monoxide detector;   · Engage in regular physical activity and social activities       Follow-up: No Follow-up on file.

## 2019-07-12 ENCOUNTER — HOSPITAL ENCOUNTER (OUTPATIENT)
Dept: LAB | Facility: MEDICAL CENTER | Age: 79
End: 2019-07-12
Attending: FAMILY MEDICINE
Payer: MEDICARE

## 2019-07-12 DIAGNOSIS — I10 ESSENTIAL HYPERTENSION: ICD-10-CM

## 2019-07-12 LAB
ALBUMIN SERPL BCP-MCNC: 3.9 G/DL (ref 3.2–4.9)
ALBUMIN/GLOB SERPL: 1.4 G/DL
ALP SERPL-CCNC: 70 U/L (ref 30–99)
ALT SERPL-CCNC: 13 U/L (ref 2–50)
ANION GAP SERPL CALC-SCNC: 5 MMOL/L (ref 0–11.9)
AST SERPL-CCNC: 18 U/L (ref 12–45)
BASOPHILS # BLD AUTO: 1.4 % (ref 0–1.8)
BASOPHILS # BLD: 0.04 K/UL (ref 0–0.12)
BILIRUB SERPL-MCNC: 0.7 MG/DL (ref 0.1–1.5)
BUN SERPL-MCNC: 19 MG/DL (ref 8–22)
CALCIUM SERPL-MCNC: 9.7 MG/DL (ref 8.5–10.5)
CHLORIDE SERPL-SCNC: 105 MMOL/L (ref 96–112)
CHOLEST SERPL-MCNC: 147 MG/DL (ref 100–199)
CO2 SERPL-SCNC: 26 MMOL/L (ref 20–33)
CREAT SERPL-MCNC: 0.75 MG/DL (ref 0.5–1.4)
EOSINOPHIL # BLD AUTO: 0.09 K/UL (ref 0–0.51)
EOSINOPHIL NFR BLD: 3.2 % (ref 0–6.9)
ERYTHROCYTE [DISTWIDTH] IN BLOOD BY AUTOMATED COUNT: 42.5 FL (ref 35.9–50)
FASTING STATUS PATIENT QL REPORTED: NORMAL
GLOBULIN SER CALC-MCNC: 2.8 G/DL (ref 1.9–3.5)
GLUCOSE SERPL-MCNC: 97 MG/DL (ref 65–99)
HCT VFR BLD AUTO: 39.5 % (ref 37–47)
HDLC SERPL-MCNC: 68 MG/DL
HGB BLD-MCNC: 13.5 G/DL (ref 12–16)
IMM GRANULOCYTES # BLD AUTO: 0.02 K/UL (ref 0–0.11)
IMM GRANULOCYTES NFR BLD AUTO: 0.7 % (ref 0–0.9)
LDLC SERPL CALC-MCNC: 66 MG/DL
LYMPHOCYTES # BLD AUTO: 0.44 K/UL (ref 1–4.8)
LYMPHOCYTES NFR BLD: 15.9 % (ref 22–41)
MCH RBC QN AUTO: 31.8 PG (ref 27–33)
MCHC RBC AUTO-ENTMCNC: 34.2 G/DL (ref 33.6–35)
MCV RBC AUTO: 92.9 FL (ref 81.4–97.8)
MONOCYTES # BLD AUTO: 0.38 K/UL (ref 0–0.85)
MONOCYTES NFR BLD AUTO: 13.7 % (ref 0–13.4)
NEUTROPHILS # BLD AUTO: 1.8 K/UL (ref 2–7.15)
NEUTROPHILS NFR BLD: 65.1 % (ref 44–72)
NRBC # BLD AUTO: 0 K/UL
NRBC BLD-RTO: 0 /100 WBC
PLATELET # BLD AUTO: 285 K/UL (ref 164–446)
PMV BLD AUTO: 10.9 FL (ref 9–12.9)
POTASSIUM SERPL-SCNC: 4.2 MMOL/L (ref 3.6–5.5)
PROT SERPL-MCNC: 6.7 G/DL (ref 6–8.2)
RBC # BLD AUTO: 4.25 M/UL (ref 4.2–5.4)
SODIUM SERPL-SCNC: 136 MMOL/L (ref 135–145)
TRIGL SERPL-MCNC: 66 MG/DL (ref 0–149)
WBC # BLD AUTO: 2.8 K/UL (ref 4.8–10.8)

## 2019-07-12 PROCEDURE — 80061 LIPID PANEL: CPT

## 2019-07-12 PROCEDURE — 85025 COMPLETE CBC W/AUTO DIFF WBC: CPT

## 2019-07-12 PROCEDURE — 36415 COLL VENOUS BLD VENIPUNCTURE: CPT

## 2019-07-12 PROCEDURE — 80053 COMPREHEN METABOLIC PANEL: CPT

## 2019-07-19 DIAGNOSIS — J30.2 SEASONAL ALLERGIES: ICD-10-CM

## 2019-07-19 RX ORDER — FLUTICASONE PROPIONATE 50 MCG
SPRAY, SUSPENSION (ML) NASAL
Qty: 16 G | Refills: 0 | Status: SHIPPED | OUTPATIENT
Start: 2019-07-19 | End: 2019-09-04 | Stop reason: SDUPTHER

## 2019-07-19 NOTE — TELEPHONE ENCOUNTER
Was the patient seen in the last year in this department? Yes LOV 5/24/19    Does patient have an active prescription for medications requested? No     Received Request Via: Pharmacy

## 2019-08-16 ENCOUNTER — TELEPHONE (OUTPATIENT)
Dept: MEDICAL GROUP | Facility: PHYSICIAN GROUP | Age: 79
End: 2019-08-16

## 2019-08-16 DIAGNOSIS — M67.40 GANGLION CYST: ICD-10-CM

## 2019-09-04 DIAGNOSIS — J30.2 SEASONAL ALLERGIES: ICD-10-CM

## 2019-09-04 RX ORDER — FLUTICASONE PROPIONATE 50 MCG
SPRAY, SUSPENSION (ML) NASAL
Qty: 16 G | Refills: 5 | Status: SHIPPED
Start: 2019-09-04 | End: 2020-06-11

## 2019-09-04 NOTE — TELEPHONE ENCOUNTER
Was the patient seen in the last year in this department? Yes LOV 05/24/19    Does patient have an active prescription for medications requested? No     Received Request Via: Pharmacy

## 2019-10-17 ENCOUNTER — ANESTHESIA EVENT (OUTPATIENT)
Dept: SURGERY | Facility: MEDICAL CENTER | Age: 79
End: 2019-10-17
Payer: MEDICARE

## 2019-10-17 ENCOUNTER — HOSPITAL ENCOUNTER (OUTPATIENT)
Facility: MEDICAL CENTER | Age: 79
End: 2019-10-17
Attending: ORTHOPAEDIC SURGERY | Admitting: ORTHOPAEDIC SURGERY
Payer: MEDICARE

## 2019-10-17 ENCOUNTER — ANESTHESIA (OUTPATIENT)
Dept: SURGERY | Facility: MEDICAL CENTER | Age: 79
End: 2019-10-17
Payer: MEDICARE

## 2019-10-17 VITALS
SYSTOLIC BLOOD PRESSURE: 116 MMHG | HEIGHT: 65 IN | WEIGHT: 112.88 LBS | HEART RATE: 62 BPM | RESPIRATION RATE: 15 BRPM | TEMPERATURE: 97.6 F | BODY MASS INDEX: 18.81 KG/M2 | DIASTOLIC BLOOD PRESSURE: 74 MMHG | OXYGEN SATURATION: 94 %

## 2019-10-17 DIAGNOSIS — G89.18 POSTOPERATIVE PAIN: ICD-10-CM

## 2019-10-17 LAB — PATHOLOGY CONSULT NOTE: NORMAL

## 2019-10-17 PROCEDURE — 88304 TISSUE EXAM BY PATHOLOGIST: CPT

## 2019-10-17 PROCEDURE — 700105 HCHG RX REV CODE 258: Performed by: ORTHOPAEDIC SURGERY

## 2019-10-17 PROCEDURE — A9270 NON-COVERED ITEM OR SERVICE: HCPCS | Performed by: ANESTHESIOLOGY

## 2019-10-17 PROCEDURE — 700101 HCHG RX REV CODE 250: Performed by: ANESTHESIOLOGY

## 2019-10-17 PROCEDURE — 160028 HCHG SURGERY MINUTES - 1ST 30 MINS LEVEL 3: Performed by: ORTHOPAEDIC SURGERY

## 2019-10-17 PROCEDURE — 160048 HCHG OR STATISTICAL LEVEL 1-5: Performed by: ORTHOPAEDIC SURGERY

## 2019-10-17 PROCEDURE — 160009 HCHG ANES TIME/MIN: Performed by: ORTHOPAEDIC SURGERY

## 2019-10-17 PROCEDURE — 700102 HCHG RX REV CODE 250 W/ 637 OVERRIDE(OP): Performed by: ANESTHESIOLOGY

## 2019-10-17 PROCEDURE — 700111 HCHG RX REV CODE 636 W/ 250 OVERRIDE (IP)

## 2019-10-17 PROCEDURE — 160002 HCHG RECOVERY MINUTES (STAT): Performed by: ORTHOPAEDIC SURGERY

## 2019-10-17 PROCEDURE — 501838 HCHG SUTURE GENERAL: Performed by: ORTHOPAEDIC SURGERY

## 2019-10-17 PROCEDURE — 160025 RECOVERY II MINUTES (STATS): Performed by: ORTHOPAEDIC SURGERY

## 2019-10-17 PROCEDURE — 500881 HCHG PACK, EXTREMITY: Performed by: ORTHOPAEDIC SURGERY

## 2019-10-17 PROCEDURE — 160035 HCHG PACU - 1ST 60 MINS PHASE I: Performed by: ORTHOPAEDIC SURGERY

## 2019-10-17 PROCEDURE — 700111 HCHG RX REV CODE 636 W/ 250 OVERRIDE (IP): Performed by: ORTHOPAEDIC SURGERY

## 2019-10-17 PROCEDURE — 700111 HCHG RX REV CODE 636 W/ 250 OVERRIDE (IP): Performed by: ANESTHESIOLOGY

## 2019-10-17 PROCEDURE — 160039 HCHG SURGERY MINUTES - EA ADDL 1 MIN LEVEL 3: Performed by: ORTHOPAEDIC SURGERY

## 2019-10-17 PROCEDURE — 160036 HCHG PACU - EA ADDL 30 MINS PHASE I: Performed by: ORTHOPAEDIC SURGERY

## 2019-10-17 PROCEDURE — 160047 HCHG PACU  - EA ADDL 30 MINS PHASE II: Performed by: ORTHOPAEDIC SURGERY

## 2019-10-17 PROCEDURE — 160046 HCHG PACU - 1ST 60 MINS PHASE II: Performed by: ORTHOPAEDIC SURGERY

## 2019-10-17 RX ORDER — CEFAZOLIN SODIUM 1 G/3ML
INJECTION, POWDER, FOR SOLUTION INTRAMUSCULAR; INTRAVENOUS PRN
Status: DISCONTINUED | OUTPATIENT
Start: 2019-10-17 | End: 2019-10-17 | Stop reason: SURG

## 2019-10-17 RX ORDER — ONDANSETRON 2 MG/ML
INJECTION INTRAMUSCULAR; INTRAVENOUS PRN
Status: DISCONTINUED | OUTPATIENT
Start: 2019-10-17 | End: 2019-10-17 | Stop reason: SURG

## 2019-10-17 RX ORDER — LIDOCAINE HYDROCHLORIDE 10 MG/ML
INJECTION, SOLUTION EPIDURAL; INFILTRATION; INTRACAUDAL; PERINEURAL
Status: COMPLETED
Start: 2019-10-17 | End: 2019-10-17

## 2019-10-17 RX ORDER — DIPHENHYDRAMINE HYDROCHLORIDE 50 MG/ML
6.25 INJECTION INTRAMUSCULAR; INTRAVENOUS
Status: DISCONTINUED | OUTPATIENT
Start: 2019-10-17 | End: 2019-10-17 | Stop reason: HOSPADM

## 2019-10-17 RX ORDER — HYDROCODONE BITARTRATE AND ACETAMINOPHEN 7.5; 325 MG/1; MG/1
1-2 TABLET ORAL EVERY 6 HOURS PRN
Qty: 10 TAB | Refills: 0 | Status: SHIPPED | OUTPATIENT
Start: 2019-10-17 | End: 2019-10-19

## 2019-10-17 RX ORDER — HYDRALAZINE HYDROCHLORIDE 20 MG/ML
5 INJECTION INTRAMUSCULAR; INTRAVENOUS
Status: DISCONTINUED | OUTPATIENT
Start: 2019-10-17 | End: 2019-10-17 | Stop reason: HOSPADM

## 2019-10-17 RX ORDER — ROPIVACAINE HYDROCHLORIDE 5 MG/ML
INJECTION, SOLUTION EPIDURAL; INFILTRATION; PERINEURAL
Status: DISCONTINUED | OUTPATIENT
Start: 2019-10-17 | End: 2019-10-17 | Stop reason: HOSPADM

## 2019-10-17 RX ORDER — LIDOCAINE HYDROCHLORIDE 20 MG/ML
INJECTION, SOLUTION EPIDURAL; INFILTRATION; INTRACAUDAL; PERINEURAL PRN
Status: DISCONTINUED | OUTPATIENT
Start: 2019-10-17 | End: 2019-10-17 | Stop reason: SURG

## 2019-10-17 RX ORDER — HALOPERIDOL 5 MG/ML
1 INJECTION INTRAMUSCULAR
Status: DISCONTINUED | OUTPATIENT
Start: 2019-10-17 | End: 2019-10-17 | Stop reason: HOSPADM

## 2019-10-17 RX ORDER — SODIUM CHLORIDE, SODIUM LACTATE, POTASSIUM CHLORIDE, CALCIUM CHLORIDE 600; 310; 30; 20 MG/100ML; MG/100ML; MG/100ML; MG/100ML
INJECTION, SOLUTION INTRAVENOUS CONTINUOUS
Status: DISCONTINUED | OUTPATIENT
Start: 2019-10-17 | End: 2019-10-17

## 2019-10-17 RX ORDER — SODIUM CHLORIDE, SODIUM LACTATE, POTASSIUM CHLORIDE, CALCIUM CHLORIDE 600; 310; 30; 20 MG/100ML; MG/100ML; MG/100ML; MG/100ML
INJECTION, SOLUTION INTRAVENOUS CONTINUOUS
Status: DISCONTINUED | OUTPATIENT
Start: 2019-10-17 | End: 2019-10-17 | Stop reason: HOSPADM

## 2019-10-17 RX ORDER — ONDANSETRON 2 MG/ML
4 INJECTION INTRAMUSCULAR; INTRAVENOUS
Status: DISCONTINUED | OUTPATIENT
Start: 2019-10-17 | End: 2019-10-17 | Stop reason: HOSPADM

## 2019-10-17 RX ORDER — DEXAMETHASONE SODIUM PHOSPHATE 4 MG/ML
INJECTION, SOLUTION INTRA-ARTICULAR; INTRALESIONAL; INTRAMUSCULAR; INTRAVENOUS; SOFT TISSUE PRN
Status: DISCONTINUED | OUTPATIENT
Start: 2019-10-17 | End: 2019-10-17 | Stop reason: SURG

## 2019-10-17 RX ADMIN — DEXAMETHASONE SODIUM PHOSPHATE 8 MG: 4 INJECTION, SOLUTION INTRA-ARTICULAR; INTRALESIONAL; INTRAMUSCULAR; INTRAVENOUS; SOFT TISSUE at 07:43

## 2019-10-17 RX ADMIN — SODIUM CHLORIDE, POTASSIUM CHLORIDE, SODIUM LACTATE AND CALCIUM CHLORIDE: 600; 310; 30; 20 INJECTION, SOLUTION INTRAVENOUS at 07:14

## 2019-10-17 RX ADMIN — FENTANYL CITRATE 50 MCG: 50 INJECTION, SOLUTION INTRAMUSCULAR; INTRAVENOUS at 07:40

## 2019-10-17 RX ADMIN — LIDOCAINE HYDROCHLORIDE 0.5 ML: 10 INJECTION, SOLUTION EPIDURAL; INFILTRATION; INTRACAUDAL at 07:14

## 2019-10-17 RX ADMIN — HYDROCODONE BITARTRATE AND ACETAMINOPHEN 15 ML: 7.5; 325 SOLUTION ORAL at 08:12

## 2019-10-17 RX ADMIN — PROPOFOL 75 MCG/KG/MIN: 10 INJECTION, EMULSION INTRAVENOUS at 07:49

## 2019-10-17 RX ADMIN — PROPOFOL 100 MG: 10 INJECTION, EMULSION INTRAVENOUS at 07:40

## 2019-10-17 RX ADMIN — LIDOCAINE HYDROCHLORIDE 40 MG: 20 INJECTION, SOLUTION EPIDURAL; INFILTRATION; INTRACAUDAL at 07:40

## 2019-10-17 RX ADMIN — EPHEDRINE SULFATE 5 MG: 50 INJECTION, SOLUTION INTRAVENOUS at 07:50

## 2019-10-17 RX ADMIN — CEFAZOLIN 2 G: 330 INJECTION, POWDER, FOR SOLUTION INTRAMUSCULAR; INTRAVENOUS at 07:40

## 2019-10-17 RX ADMIN — Medication 0.5 ML: at 07:14

## 2019-10-17 RX ADMIN — ONDANSETRON 4 MG: 2 INJECTION INTRAMUSCULAR; INTRAVENOUS at 07:49

## 2019-10-17 NOTE — ANESTHESIA PROCEDURE NOTES
Airway  Date/Time: 10/17/2019 7:40 AM  Performed by: Nikos Bunn M.D.  Authorized by: Nikos Bunn M.D.     Location:  OR  Urgency:  Elective  Difficult Airway: No    Indications for Airway Management:  Anesthesia  Spontaneous Ventilation: absent    Sedation Level:  Deep  Preoxygenated: Yes    Final Airway Type:  Supraglottic airway  Final Supraglottic Airway:  Standard LMA  SGA Size:  3  Number of Attempts at Approach:  1

## 2019-10-17 NOTE — ANESTHESIA POSTPROCEDURE EVALUATION
Patient: Beth Fuller    Procedure Summary     Date:  10/17/19 Room / Location:  [place] [address] / SURGERY [facility]    Anesthesia Start:  0736 Anesthesia Stop:  0809    Procedure:  EXCISION, GANGLION CYST- WRIST (Right Wrist) Diagnosis:  (pain in right wrist)    Surgeon:  Og Lima M.D. Responsible Provider:  Nikos Bunn M.D.    Anesthesia Type:  general ASA Status:  2          Final Anesthesia Type: general  Last vitals  BP   Blood Pressure : 121/77, NIBP: 136/87    Temp   36.3 °C (97.3 °F)    Pulse   Pulse: 74   Resp   16    SpO2   100 %      Anesthesia Post Evaluation    Patient location during evaluation: PACU  Patient participation: complete - patient participated  Level of consciousness: sleepy but conscious    Airway patency: patent  Anesthetic complications: no  Cardiovascular status: hemodynamically stable  Respiratory status: acceptable  Hydration status: euvolemic    PONV: none           Nurse Pain Score: 0 (NPRS)

## 2019-10-17 NOTE — OP REPORT
DATE OF SERVICE:  10/17/2019    PREOPERATIVE DIAGNOSIS:  Ganglion, volar aspect right wrist.    POSTOPERATIVE DIAGNOSIS:  Ganglion, volar aspect right wrist.    OPERATION PERFORMED:  Excision of ganglion distal radioulnar joint with   radioulnar instability noted.    INDICATIONS FOR THE OPERATION:  Pain, palpable ganglion in the distal third of   the forearm.    COMPLICATIONS:  None.    APPROACH:  Flexor carpi ulnaris.    DESCRIPTION OF OPERATION:  The patient was brought to the operating room,   awake, alert, placed on the operating room table in supine position.    Time-out, delivered general endotracheal anesthetic.  Right upper extremity   was shaved, scrubbed, prepped and draped in normal sterile routine fashion.  A   small 1-inch incision was made along the flexor carpi ulnaris and dissected   down to the large ganglion coming out of the radioulnar joint.  We basically   excised it and sent the tissue for pathology.  During exam, I noted radioulnar   instability.  Due to the patient's age and relative asymptomatic findings   with pronation and supination, we elected not to perform any radioulnar   stabilization.    The wounds were now simply copiously irrigated, closed with 0, 2-0, sterile   compression dressing.  The patient was taken to the recovery room in stable   condition.  No intraoperative or immediate postoperative complications.  The   patient tolerated the procedure well.       ____________________________________     MD GISSELLE MAYFIELD / EBONIE    DD:  10/17/2019 08:03:28  DT:  10/17/2019 10:43:18    D#:  [id number]  Job#:  [id number]

## 2019-10-17 NOTE — DISCHARGE INSTRUCTIONS
ACTIVITY: Rest and take it easy for the first 24 hours.  A responsible adult is recommended to remain with you during that time.  It is normal to feel sleepy.  We encourage you to not do anything that requires balance, judgment or coordination.    MILD FLU-LIKE SYMPTOMS ARE NORMAL. YOU MAY EXPERIENCE GENERALIZED MUSCLE ACHES, THROAT IRRITATION, HEADACHE AND/OR SOME NAUSEA.    FOR 24 HOURS DO NOT:  Drive, operate machinery or run household appliances.  Drink beer or alcoholic beverages.   Make important decisions or sign legal documents.    DIET: To avoid nausea, slowly advance diet as tolerated, avoiding spicy or greasy foods for the first day.  Add more substantial food to your diet according to your physician's instructions.  INCREASE FLUIDS AND FIBER TO AVOID CONSTIPATION.    SURGICAL DRESSING/BATHING: Keep clean and dry until follow up appointment.    FOLLOW-UP APPOINTMENT:  A follow-up appointment should be arranged with your doctor as scheduled; call to schedule.    You should CALL YOUR PHYSICIAN if you develop:  Fever greater than 101 degrees F.  Pain not relieved by medication, or persistent nausea or vomiting.  Excessive bleeding (blood soaking through dressing) or unexpected drainage from the wound.  Extreme redness or swelling around the incision site, drainage of pus or foul smelling drainage.  Inability to urinate or empty your bladder within 8 hours.  Problems with breathing or chest pain.    You should call 911 if you develop problems with breathing or chest pain.  If you are unable to contact your doctor or surgical center, you should go to the nearest emergency room or urgent care center.  Physician's telephone #: Dr. Lima [phone]    If any questions arise, call your doctor.  If your doctor is not available, please feel free to call the Surgical Center at [phone].  The Center is open Monday through Friday from 7AM to 7PM.  You can also call the "[employer] HOTLINE open 24 hours/day, 7 days/week  and speak to a nurse at [phone], or toll free at [phone].    A registered nurse may call you a few days after your surgery to see how you are doing after your procedure.    MEDICATIONS: Resume taking daily medication.  Take prescribed pain medication with food.  If no medication is prescribed, you may take non-aspirin pain medication if needed.  PAIN MEDICATION CAN BE VERY CONSTIPATING.  Take a stool softener or laxative such as senokot, pericolace, or milk of magnesia if needed.    Prescription given for Norco (pain medication).  Last pain medication given at 8:12 am.    If your physician has prescribed pain medication that includes Acetaminophen (Tylenol), do not take additional Acetaminophen (Tylenol) while taking the prescribed medication.    Depression / Suicide Risk    As you are discharged from this [facility] facility, it is important to learn how to keep safe from harming yourself.    Recognize the warning signs:  · Abrupt changes in personality, positive or negative- including increase in energy   · Giving away possessions  · Change in eating patterns- significant weight changes-  positive or negative  · Change in sleeping patterns- unable to sleep or sleeping all the time   · Unwillingness or inability to communicate  · Depression  · Unusual sadness, discouragement and loneliness  · Talk of wanting to die  · Neglect of personal appearance   · Rebelliousness- reckless behavior  · Withdrawal from people/activities they love  · Confusion- inability to concentrate     If you or a loved one observes any of these behaviors or has concerns about self-harm, here's what you can do:  · Talk about it- your feelings and reasons for harming yourself  · Remove any means that you might use to hurt yourself (examples: pills, rope, extension cords, firearm)  · Get professional help from the community (Mental Health, Substance Abuse, psychological counseling)  · Do not be alone:Call your Safe Contact-  someone whom you trust who will be there for you.  · Call your local CRISIS HOTLINE 467-6505 or 092-142-1248  · Call your local Children's Mobile Crisis Response Team [place] (480) 470-5821 or www.[employer]  · Call the toll free National Suicide Prevention Hotlines   · National Suicide Prevention Lifeline 348-173-GNIW (6719)  · [employer] [facility] 800-SUICIDE (871-9971)

## 2019-10-17 NOTE — PROGRESS NOTES
0919- Patient arrived in PACU II alert and oriented. Vital signs are within normal limits for the patient. Patient reports pain 4/10 but it is tolerable at this time. Dressing is clean, dry, and intact. Discussed discharge plan of care and patient expressed understanding. All needs met at this time.    1020- Patient feels ready to be discharged and meets discharge criteria set by Dr. Lima. Patient is going to get dressed.    1030- Provided patient is with discharge education with daughter, Gretel, at bedside. All questions answered.    1040- PIV removed and patient discharged home via wheelchair.

## 2019-10-17 NOTE — ANESTHESIA PREPROCEDURE EVALUATION
Past Medical History:   Diagnosis Date   • Anesthesia 1974/2002    PONV   • Arthritis    • Breast cancer (HCC)    • Cancer (HCC) 2006    right breast    • CATARACT     surgical correction bilateral   • Dental disorder     bridge upper front; 5/7/2015 dental extraction with infection; temporary right lower   • Diverticulitis    • GERD (gastroesophageal reflux disease)    • Heart burn 12/17/13   • Hypertension    • Indigestion    • MEDICAL HOME 6/9/2015   • Osteoporosis    • Pain     chronic pain knees; ankles, feet, right shoulder, arm and hand   • Personal history of venous thrombosis and embolism 12/31/2013    leg   • Pneumonia 1984   • PVD (peripheral vascular disease) ([facility])    • Unspecified hemorrhagic conditions     had GI bleed diverticulitis         Relevant Problems   CARDIAC   (+) Chronic venous insufficiency   (+) Ectatic abdominal aorta (HCC)   (+) Hypertension      GI   (+) Gastroesophageal reflux disease without esophagitis      Other   (+) Arthritis   lbbb    Physical Exam    Airway   Mallampati: II  TM distance: >3 FB  Neck ROM: full       Cardiovascular - normal exam  Rhythm: regular  Rate: normal  (-) murmur     Dental - normal exam  (+) upper dentures         Pulmonary - normal exam  Breath sounds clear to auscultation     Abdominal    Neurological - normal exam                 Anesthesia Plan    ASA 2       Plan - general       Airway plan will be LMA        Induction: intravenous    Postoperative Plan: Postoperative administration of opioids is intended.    Pertinent diagnostic labs and testing reviewed    Informed Consent:    Anesthetic plan and risks discussed with patient.

## 2019-10-17 NOTE — OR SURGEON
Immediate Post OP Note    PreOp Diagnosis: gangilon volarrightwrist    PostOp Diagnosis: same- distal volar radial ulnar joint    Procedure(s):  EXCISION, GANGLION CYST- WRIST - Wound Class: Clean    Surgeon(s):  Og Lima M.D.    Anesthesiologist/Type of Anesthesia:  Anesthesiologist: Nikos Bunn M.D./General    Surgical Staff:  Circulator: Marcelle Garcia R.N.  First Assist: Jada Fitzpatrick    Specimens removed if any:  ID Type Source Tests Collected by Time Destination   A : Ganglion excision Right wrist cyst ulnar region Other Other PATHOLOGY SPECIMEN, AEROBIC/ANAEROBIC CULTURE (SURGERY) Og Lima M.D. 10/17/2019  6:55 AM        Estimated Blood Loss: minimnal    Findings: as described    Complications: none        10/17/2019 8:04 AM Og Lima M.D.

## 2019-11-18 ENCOUNTER — PATIENT OUTREACH (OUTPATIENT)
Dept: HEALTH INFORMATION MANAGEMENT | Facility: OTHER | Age: 79
End: 2019-11-18

## 2019-11-18 SDOH — ECONOMIC STABILITY: FOOD INSECURITY: WITHIN THE PAST 12 MONTHS, YOU WORRIED THAT YOUR FOOD WOULD RUN OUT BEFORE YOU GOT MONEY TO BUY MORE.: OFTEN TRUE

## 2019-11-18 SDOH — ECONOMIC STABILITY: TRANSPORTATION INSECURITY
IN THE PAST 12 MONTHS, HAS THE LACK OF TRANSPORTATION KEPT YOU FROM MEDICAL APPOINTMENTS OR FROM GETTING MEDICATIONS?: NO

## 2019-11-18 SDOH — ECONOMIC STABILITY: FOOD INSECURITY: WITHIN THE PAST 12 MONTHS, THE FOOD YOU BOUGHT JUST DIDN'T LAST AND YOU DIDN'T HAVE MONEY TO GET MORE.: OFTEN TRUE

## 2019-11-18 SDOH — ECONOMIC STABILITY: TRANSPORTATION INSECURITY
IN THE PAST 12 MONTHS, HAS LACK OF TRANSPORTATION KEPT YOU FROM MEETINGS, WORK, OR FROM GETTING THINGS NEEDED FOR DAILY LIVING?: NO

## 2019-11-18 NOTE — PROGRESS NOTES
1. HealthConnect Verified: yes    2. Verify PCP: yes    3. Review and add  to Care Team: yes    5. Reviewed/Updated the following with patient:       •   Communication Preference Obtained? YES  • MyChart Activation: already active       •   E-Mail Address Obtained? YES       •   Appointment Day and Time Preferences? YES       •   Preferred Pharmacy? YES       •   Preferred Lab? YES    6. Care Gap Scheduling (Attempt to Schedule EACH Overdue Care Gap!)    Patient declined Annual Wellness Visit (AWV) .      Please note:  Violette's patient called the outreach line and let me know she go a letter in the mail about the program. She had no other questions at this time. If she calls back please transfer to [phone]

## 2019-11-26 ENCOUNTER — HOSPITAL ENCOUNTER (OUTPATIENT)
Dept: RADIOLOGY | Facility: MEDICAL CENTER | Age: 79
End: 2019-11-26
Attending: FAMILY MEDICINE
Payer: MEDICARE

## 2019-11-26 ENCOUNTER — OFFICE VISIT (OUTPATIENT)
Dept: MEDICAL GROUP | Facility: PHYSICIAN GROUP | Age: 79
End: 2019-11-26
Payer: MEDICARE

## 2019-11-26 VITALS
HEIGHT: 65 IN | HEART RATE: 78 BPM | OXYGEN SATURATION: 98 % | DIASTOLIC BLOOD PRESSURE: 76 MMHG | BODY MASS INDEX: 18.83 KG/M2 | SYSTOLIC BLOOD PRESSURE: 124 MMHG | WEIGHT: 113 LBS | TEMPERATURE: 98.1 F

## 2019-11-26 DIAGNOSIS — I70.0 AORTIC ATHEROSCLEROSIS (HCC): ICD-10-CM

## 2019-11-26 DIAGNOSIS — I10 ESSENTIAL HYPERTENSION: ICD-10-CM

## 2019-11-26 DIAGNOSIS — R05.9 COUGH: ICD-10-CM

## 2019-11-26 DIAGNOSIS — J10.1 INFLUENZA B: ICD-10-CM

## 2019-11-26 LAB
FLUAV+FLUBV AG SPEC QL IA: POSITIVE
INT CON NEG: NEGATIVE
INT CON POS: POSITIVE

## 2019-11-26 PROCEDURE — 99214 OFFICE O/P EST MOD 30 MIN: CPT | Performed by: FAMILY MEDICINE

## 2019-11-26 PROCEDURE — 8041 PR SCP AHA: Performed by: FAMILY MEDICINE

## 2019-11-26 PROCEDURE — 71046 X-RAY EXAM CHEST 2 VIEWS: CPT

## 2019-11-26 PROCEDURE — 87804 INFLUENZA ASSAY W/OPTIC: CPT | Performed by: FAMILY MEDICINE

## 2019-11-26 RX ORDER — BENZONATATE 100 MG/1
100 CAPSULE ORAL 3 TIMES DAILY PRN
Qty: 60 CAP | Refills: 0 | Status: SHIPPED
Start: 2019-11-26 | End: 2020-06-11

## 2019-11-26 ASSESSMENT — PAIN SCALES - GENERAL: PAINLEVEL: 4=SLIGHT-MODERATE PAIN

## 2019-11-26 NOTE — ASSESSMENT & PLAN NOTE
Chronic ongoing medical condition.  Patient currently taking amlodipine 5 mg once daily.  Blood pressure today currently well controlled at 124/76.

## 2019-11-26 NOTE — PROGRESS NOTES
Annual Health Assessment Questions:    1.  Are you currently engaging in any exercise or physical activity? Yes    2.  How would you describe your mood or emotional well-being today? good    3.  Have you had any falls in the last year? No    4.  Have you noticed any problems with your balance or had difficulty walking? No    5.  In the last six months have you experienced any leakage of urine? No    6. DPA/Advanced Directive: Patient has Advanced Directive, but it is not on file. Instructed to bring in a copy to scan into their chart.    CC:Diagnoses of Cough, Aortic atherosclerosis (HCC), Essential hypertension, and Influenza B were pertinent to this visit.      HISTORY OF PRESENT ILLNESS: Patient is a 79 y.o. female established patient who presents today to follow-up on blood pressure and recent cough.    Aortic atherosclerosis (HCC)  Chronic ongoing medical condition.  Previously noted by Dr. Rodriguez and again seen on CT 12/12/2018.  Not currently on cholesterol-lowering medication at this time.  Currently taking Norvasc 5 mg once daily.    Hypertension  Chronic ongoing medical condition.  Patient currently taking amlodipine 5 mg once daily.  Blood pressure today currently well controlled at 124/76.    Cough  New problem to examiner.  Patient with cough x1 week with no known sick contacts.  Patient states that she has had headaches, myalgias, nonproductive cough and some mild chest pain associated with her cough.  In general her headaches and myalgias are improving, but her cough is unchanged.  Denies any shortness of breath or chest pain on exertion.  Denies any dyspnea on exertion.  Patient has tried Mucinex and allergy medicines with little improvement in her symptoms.      Patient Active Problem List    Diagnosis Date Noted   • Raynaud's phenomenon (by history or observed) 02/14/2014     Priority: Medium   • Chronic venous insufficiency 02/14/2014     Priority: Medium   • Hypertension 12/05/2013     Priority:  Medium   • Aortic atherosclerosis (HCC) 11/26/2019   • Cough 11/26/2019   • Ectatic abdominal aorta ([facility]) 03/15/2019   • Mouth pain 02/08/2019   • Anxiety 02/08/2019   • Seasonal allergies 11/30/2018   • Arthritis 11/30/2018   • Gastroesophageal reflux disease without esophagitis 02/06/2018   • Osteoarthritis 12/07/2013   • Diverticulosis 12/05/2013      Allergies:Ibuprofen; Morphine; and Other misc    Current Outpatient Medications   Medication Sig Dispense Refill   • benzonatate (TESSALON) 100 MG Cap Take 1 Cap by mouth 3 times a day as needed for Cough. 60 Cap 0   • Taurine 1000 MG Cap Take 1 Cap by mouth every day.     • Multiple Vitamins-Minerals (VISION PLUS PO) Take 1 Tab by mouth every day.     • fluticasone (FLONASE) 50 MCG/ACT nasal spray USE 1 SPRAY IN EACH NOSTRIL ONCE DAILY AS DIRECTED. 16 g 5   • omeprazole (PRILOSEC) 20 MG delayed-release capsule TAKE ONE CAPSULE BY MOUTH ONCE DAILY 90 Cap 3   • amLODIPine (NORVASC) 5 MG Tab Take 1 Tab by mouth every day. 90 Tab 3   • Specialty Vitamins Products (VARISAN VITALITY PO) Take 1 Tab by mouth every day.     • estradiol (ESTRACE) 0.1 MG/GM vaginal cream Use 1 gram vaginally 3 times week 1 Tube 6   • vitamin D, Ergocalciferol, (DRISDOL) 56880 units Cap capsule Take 50,000 Units by mouth every 7 days.     • Guaifenesin 400 MG Tab Take 1 Tab by mouth every day.     • loratadine (CLARITIN) 10 MG TABS Take 10 mg by mouth every day.     • Multiple Vitamins-Minerals (MULTIVITAMIN PO) Take 2 Tabs by mouth every day.       No current facility-administered medications for this visit.        Social History     Tobacco Use   • Smoking status: Never Smoker   • Smokeless tobacco: Never Used   Substance Use Topics   • Alcohol use: No   • Drug use: No     Social History     Patient does not qualify to have social determinant information on file (likely too young).   Social History Narrative   • Not on file       Family History   Problem Relation Age of Onset   • Heart  "Disease Mother    • Hypertension Mother    • Stroke Mother    • Diabetes Mother    • Dementia Mother    • Arthritis Mother    • Diabetes Brother    • Dementia Brother    • Cancer Father         Colon   • Heart Disease Maternal Grandmother    • Kidney Disease Maternal Grandmother    • Heart Attack Maternal Grandfather    • No Known Problems Brother    • Hypertension Brother    • No Known Problems Brother        Review of Systems:    GI: No Nausea/Vomiting  MSK: No weakness      Exam:    /76 (BP Location: Left arm, Patient Position: Sitting, BP Cuff Size: Adult)   Pulse 78   Temp 36.7 °C (98.1 °F)   Ht 1.651 m (5' 5\")   Wt 51.3 kg (113 lb)   SpO2 98%  Body mass index is 18.8 kg/m².    GENERAL: No acute distress, thin  HENT: Atraumatic, normocephalic,   EYES: Extraocular movements intact, pupils equal and reactive to light.  NECK: Supple, FROM  CHEST: No deformities, Equal chest expansion, no murmurs, gallops, or rubs.  RESP: Unlabored, no stridor or audible wheeze.  Left lower quadrant wheeze, but otherwise no crackles, nor rhonchi  ABD: Soft, Nontender, Non-Distended.  Normal bowel sounds.  No hepatosplenomegaly  Extremities: No Clubbing, Cyanosis, or Edema.  Skin: Warm/dry, without rashes  Neuro: A/O x 4, CN 2-12 Grossly intact, Motor/sensory grossly intact  Psych: Normal behavior, normal affect    LABS: Influenza B positive: Results reviewed and discussed with the patient, questions answered.    Chest x-ray with 2 views independently visualized by myself today.  Evidence of sclerosis and aortic calcifications however the lung fields are clear without evidence of consolidation.    Assessment/Plan:  1. Cough  4. Influenza B  New problem to examiner.  Positive influenza B.  Outside window for treatment.  Patient lives alone.  Counseled on warning her family on Thanksgiving when she travels about her recent flu B positive testing.  Chest x-ray within normal limits.  Benzonatate sent to pharmacy.  Follow-up " as needed.  - POCT Influenza A/B  - DX-CHEST-2 VIEWS; Future  - benzonatate (TESSALON) 100 MG Cap; Take 1 Cap by mouth 3 times a day as needed for Cough.  Dispense: 60 Cap; Refill: 0    2. Aortic atherosclerosis (HCC)  Chronic ongoing medical condition.  Counseled on cholesterol medicine which patient declines.    3. Essential hypertension  Chronic stable medical condition.  Continue amlodipine 5 mg once daily.    Follow-up in 6 months or sooner as needed.    Please note that this dictation was created using voice recognition software. I have worked with consultants from the vendor as well as technical experts from [facility] to optimize the interface. I have made every reasonable attempt to correct obvious errors, but I expect that there are errors of grammar and possibly content that I did not discover before finalizing the note.

## 2019-11-26 NOTE — ASSESSMENT & PLAN NOTE
Chronic ongoing medical condition.  Previously noted by Dr. Rodriguez and again seen on CT 12/12/2018.  Not currently on cholesterol-lowering medication at this time.  Currently taking Norvasc 5 mg once daily.

## 2019-11-26 NOTE — ASSESSMENT & PLAN NOTE
New problem to examiner.  Patient with cough x1 week with no known sick contacts.  Patient states that she has had headaches, myalgias, nonproductive cough and some mild chest pain associated with her cough.  In general her headaches and myalgias are improving, but her cough is unchanged.  Denies any shortness of breath or chest pain on exertion.  Denies any dyspnea on exertion.  Patient has tried Mucinex and allergy medicines with little improvement in her symptoms.

## 2019-12-31 RX ORDER — ESTRADIOL 0.1 MG/G
CREAM VAGINAL
Qty: 42.5 G | Refills: 0 | Status: SHIPPED | OUTPATIENT
Start: 2019-12-31 | End: 2020-05-21

## 2020-01-21 ENCOUNTER — HOSPITAL ENCOUNTER (OUTPATIENT)
Dept: RADIOLOGY | Facility: MEDICAL CENTER | Age: 80
End: 2020-01-21
Attending: INTERNAL MEDICINE
Payer: MEDICARE

## 2020-01-24 ENCOUNTER — HOSPITAL ENCOUNTER (OUTPATIENT)
Dept: LAB | Facility: MEDICAL CENTER | Age: 80
End: 2020-01-24
Attending: INTERNAL MEDICINE
Payer: MEDICARE

## 2020-01-24 LAB
BUN SERPL-MCNC: 22 MG/DL (ref 8–22)
CREAT SERPL-MCNC: 0.9 MG/DL (ref 0.5–1.4)

## 2020-01-24 PROCEDURE — 82565 ASSAY OF CREATININE: CPT

## 2020-01-24 PROCEDURE — 36415 COLL VENOUS BLD VENIPUNCTURE: CPT

## 2020-01-24 PROCEDURE — 84520 ASSAY OF UREA NITROGEN: CPT

## 2020-01-31 ENCOUNTER — HOSPITAL ENCOUNTER (OUTPATIENT)
Dept: RADIOLOGY | Facility: MEDICAL CENTER | Age: 80
End: 2020-01-31
Attending: INTERNAL MEDICINE
Payer: MEDICARE

## 2020-01-31 DIAGNOSIS — K86.2 PANCREATIC CYST: ICD-10-CM

## 2020-01-31 PROCEDURE — 700117 HCHG RX CONTRAST REV CODE 255: Performed by: INTERNAL MEDICINE

## 2020-01-31 PROCEDURE — 74170 CT ABD WO CNTRST FLWD CNTRST: CPT

## 2020-01-31 RX ADMIN — IOHEXOL 100 ML: 350 INJECTION, SOLUTION INTRAVENOUS at 15:17

## 2020-03-25 ENCOUNTER — HOSPITAL ENCOUNTER (OUTPATIENT)
Dept: LAB | Facility: MEDICAL CENTER | Age: 80
End: 2020-03-25
Attending: INTERNAL MEDICINE
Payer: MEDICARE

## 2020-03-25 LAB — 25(OH)D3 SERPL-MCNC: 93 NG/ML (ref 30–100)

## 2020-03-25 PROCEDURE — 36415 COLL VENOUS BLD VENIPUNCTURE: CPT

## 2020-03-25 PROCEDURE — 82306 VITAMIN D 25 HYDROXY: CPT

## 2020-04-06 ENCOUNTER — PATIENT OUTREACH (OUTPATIENT)
Dept: HEALTH INFORMATION MANAGEMENT | Facility: OTHER | Age: 80
End: 2020-04-06

## 2020-04-06 NOTE — PROGRESS NOTES
Outcome: [facility] Introduction will be getting her results back from colonoscopy and I let her know I will follow up on her.     Please transfer to Patient Outreach Team at [phone] when patient returns call.    [facility] Verified: yes    Attempt # 1

## 2020-04-06 NOTE — PROGRESS NOTES
Outcome: Requested call back     Please transfer to Patient Outreach Team at [phone] when patient returns call.    [facility] Verified: yes    Attempt # 1

## 2020-05-15 ENCOUNTER — TELEPHONE (OUTPATIENT)
Dept: MEDICAL GROUP | Facility: PHYSICIAN GROUP | Age: 80
End: 2020-05-15

## 2020-05-15 NOTE — TELEPHONE ENCOUNTER
ESTABLISHED PATIENT PRE-VISIT PLANNING     Patient was NOT contacted to complete PVP.     1.  Reviewed notes from the last few office visits within the medical group: Yes  LOV 11/26/2019  2.  If any orders were placed at last visit or intended to be done for this visit (i.e. 6 mos follow-up), do we have Results/Consult Notes?        •  Labs - Labs were not ordered at last office visit.       •  Imaging - Imaging ordered, completed and results are in chart.       •  Referrals - No referrals were ordered at last office visit.    3. Is this appointment scheduled as a Hospital Follow-Up? No    4.  Immunizations were updated in Epic using WebIZ?: No WebIZ record       •  Web Iz Recommendations: SHINGRIX (Shingles)    5.  Patient is due for the following Health Maintenance Topics:   Health Maintenance Due   Topic Date Due   • IMM ZOSTER VACCINES (1 of 2) 04/03/1988   • IMM PNEUMOCOCCAL VACCINE: 65+ Years (1 of 2 - PCV13) 04/03/2003   • Annual Wellness Visit  05/05/2019   • BONE DENSITY  10/08/2019     6. Orders for overdue Health Maintenance topics pended in Pre-Charting? NO    7.  AHA (MDX) form printed for Provider? YES    8.  Patient was NOT informed to arrive 15 min prior to their scheduled appointment and bring in their medication bottles.

## 2020-05-21 RX ORDER — ESTRADIOL 0.1 MG/G
CREAM VAGINAL
Qty: 42.5 G | Refills: 0 | Status: SHIPPED | OUTPATIENT
Start: 2020-05-21 | End: 2020-06-03

## 2020-05-21 NOTE — TELEPHONE ENCOUNTER
Received request via: Pharmacy    Was the patient seen in the last year in this department? Yes LOV 11/26/19    Does the patient have an active prescription (recently filled or refills available) for medication(s) requested? No

## 2020-06-03 DIAGNOSIS — I10 ESSENTIAL HYPERTENSION: ICD-10-CM

## 2020-06-03 RX ORDER — ESTRADIOL 0.1 MG/G
CREAM VAGINAL
Qty: 42.5 G | Refills: 0 | Status: SHIPPED | OUTPATIENT
Start: 2020-06-03 | End: 2022-02-15 | Stop reason: SDUPTHER

## 2020-06-03 RX ORDER — AMLODIPINE BESYLATE 5 MG/1
TABLET ORAL
Qty: 90 TAB | Refills: 0 | Status: SHIPPED | OUTPATIENT
Start: 2020-06-03 | End: 2020-06-08 | Stop reason: SDUPTHER

## 2020-06-03 NOTE — TELEPHONE ENCOUNTER
Received request via: Pharmacy    Was the patient seen in the last year in this department? Yes 11/26/19    Does the patient have an active prescription (recently filled or refills available) for medication(s) requested? No

## 2020-06-08 DIAGNOSIS — I10 ESSENTIAL HYPERTENSION: ICD-10-CM

## 2020-06-08 RX ORDER — AMLODIPINE BESYLATE 5 MG/1
TABLET ORAL
Qty: 100 TAB | Refills: 0 | Status: SHIPPED | OUTPATIENT
Start: 2020-06-08 | End: 2020-10-16 | Stop reason: SDUPTHER

## 2020-06-08 NOTE — TELEPHONE ENCOUNTER
Requested Prescriptions     Pending Prescriptions Disp Refills   • amLODIPine (NORVASC) 5 MG Tab 100 Tab 0     Sig: TAKE ONE TABLET BY MOUTH ONCE DAILY   Holly Denny M.D.

## 2020-06-08 NOTE — TELEPHONE ENCOUNTER
Received request via: Patient Insurance [employer] 100 day supply required    Was the patient seen in the last year in this department? Yes    Does the patient have an active prescription (recently filled or refills available) for medication(s) requested? Insurance [employer] 100 day supply required

## 2020-06-11 ENCOUNTER — APPOINTMENT (OUTPATIENT)
Dept: RADIOLOGY | Facility: MEDICAL CENTER | Age: 80
End: 2020-06-11
Attending: EMERGENCY MEDICINE
Payer: MEDICARE

## 2020-06-11 ENCOUNTER — HOSPITAL ENCOUNTER (OUTPATIENT)
Facility: MEDICAL CENTER | Age: 80
End: 2020-06-13
Attending: EMERGENCY MEDICINE | Admitting: HOSPITALIST
Payer: MEDICARE

## 2020-06-11 DIAGNOSIS — K92.2 LOWER GI BLEED: ICD-10-CM

## 2020-06-11 LAB
ABO GROUP BLD: NORMAL
ALBUMIN SERPL BCP-MCNC: 3.8 G/DL (ref 3.2–4.9)
ALBUMIN/GLOB SERPL: 1.7 G/DL
ALP SERPL-CCNC: 69 U/L (ref 30–99)
ALT SERPL-CCNC: 12 U/L (ref 2–50)
ANION GAP SERPL CALC-SCNC: 11 MMOL/L (ref 7–16)
APTT PPP: 28.6 SEC (ref 24.7–36)
APTT PPP: 29.6 SEC (ref 24.7–36)
AST SERPL-CCNC: 15 U/L (ref 12–45)
BASOPHILS # BLD AUTO: 0.8 % (ref 0–1.8)
BASOPHILS # BLD: 0.03 K/UL (ref 0–0.12)
BILIRUB SERPL-MCNC: 0.4 MG/DL (ref 0.1–1.5)
BLD GP AB SCN SERPL QL: NORMAL
BUN SERPL-MCNC: 21 MG/DL (ref 8–22)
CALCIUM SERPL-MCNC: 8.4 MG/DL (ref 8.5–10.5)
CHLORIDE SERPL-SCNC: 103 MMOL/L (ref 96–112)
CO2 SERPL-SCNC: 23 MMOL/L (ref 20–33)
CREAT SERPL-MCNC: 0.61 MG/DL (ref 0.5–1.4)
EKG IMPRESSION: NORMAL
EOSINOPHIL # BLD AUTO: 0.02 K/UL (ref 0–0.51)
EOSINOPHIL NFR BLD: 0.5 % (ref 0–6.9)
ERYTHROCYTE [DISTWIDTH] IN BLOOD BY AUTOMATED COUNT: 42.7 FL (ref 35.9–50)
GLOBULIN SER CALC-MCNC: 2.2 G/DL (ref 1.9–3.5)
GLUCOSE SERPL-MCNC: 126 MG/DL (ref 65–99)
HCT VFR BLD AUTO: 35.3 % (ref 37–47)
HGB BLD-MCNC: 12.4 G/DL (ref 12–16)
IMM GRANULOCYTES # BLD AUTO: 0.01 K/UL (ref 0–0.11)
IMM GRANULOCYTES NFR BLD AUTO: 0.3 % (ref 0–0.9)
INR PPP: 0.99 (ref 0.87–1.13)
INR PPP: 1.11 (ref 0.87–1.13)
LIPASE SERPL-CCNC: 50 U/L (ref 11–82)
LYMPHOCYTES # BLD AUTO: 0.56 K/UL (ref 1–4.8)
LYMPHOCYTES NFR BLD: 14.7 % (ref 22–41)
MAGNESIUM SERPL-MCNC: 1.8 MG/DL (ref 1.5–2.5)
MCH RBC QN AUTO: 32.8 PG (ref 27–33)
MCHC RBC AUTO-ENTMCNC: 35.1 G/DL (ref 33.6–35)
MCV RBC AUTO: 93.4 FL (ref 81.4–97.8)
MONOCYTES # BLD AUTO: 0.48 K/UL (ref 0–0.85)
MONOCYTES NFR BLD AUTO: 12.6 % (ref 0–13.4)
NEUTROPHILS # BLD AUTO: 2.7 K/UL (ref 2–7.15)
NEUTROPHILS NFR BLD: 71.1 % (ref 44–72)
NRBC # BLD AUTO: 0 K/UL
NRBC BLD-RTO: 0 /100 WBC
PLATELET # BLD AUTO: 209 K/UL (ref 164–446)
PMV BLD AUTO: 10.2 FL (ref 9–12.9)
POTASSIUM SERPL-SCNC: 4.2 MMOL/L (ref 3.6–5.5)
PROT SERPL-MCNC: 6 G/DL (ref 6–8.2)
PROTHROMBIN TIME: 13.3 SEC (ref 12–14.6)
PROTHROMBIN TIME: 14.6 SEC (ref 12–14.6)
RBC # BLD AUTO: 3.78 M/UL (ref 4.2–5.4)
RH BLD: NORMAL
SODIUM SERPL-SCNC: 137 MMOL/L (ref 135–145)
WBC # BLD AUTO: 3.8 K/UL (ref 4.8–10.8)

## 2020-06-11 PROCEDURE — C9113 INJ PANTOPRAZOLE SODIUM, VIA: HCPCS | Performed by: HOSPITALIST

## 2020-06-11 PROCEDURE — 85730 THROMBOPLASTIN TIME PARTIAL: CPT

## 2020-06-11 PROCEDURE — 83690 ASSAY OF LIPASE: CPT

## 2020-06-11 PROCEDURE — 93005 ELECTROCARDIOGRAM TRACING: CPT | Performed by: EMERGENCY MEDICINE

## 2020-06-11 PROCEDURE — 86850 RBC ANTIBODY SCREEN: CPT

## 2020-06-11 PROCEDURE — 83735 ASSAY OF MAGNESIUM: CPT

## 2020-06-11 PROCEDURE — 85025 COMPLETE CBC W/AUTO DIFF WBC: CPT

## 2020-06-11 PROCEDURE — 36415 COLL VENOUS BLD VENIPUNCTURE: CPT

## 2020-06-11 PROCEDURE — 85610 PROTHROMBIN TIME: CPT

## 2020-06-11 PROCEDURE — 96374 THER/PROPH/DIAG INJ IV PUSH: CPT

## 2020-06-11 PROCEDURE — 86901 BLOOD TYPING SEROLOGIC RH(D): CPT

## 2020-06-11 PROCEDURE — 71045 X-RAY EXAM CHEST 1 VIEW: CPT

## 2020-06-11 PROCEDURE — 99285 EMERGENCY DEPT VISIT HI MDM: CPT

## 2020-06-11 PROCEDURE — 700111 HCHG RX REV CODE 636 W/ 250 OVERRIDE (IP): Performed by: HOSPITALIST

## 2020-06-11 PROCEDURE — 99497 ADVNCD CARE PLAN 30 MIN: CPT | Performed by: HOSPITALIST

## 2020-06-11 PROCEDURE — 80053 COMPREHEN METABOLIC PANEL: CPT

## 2020-06-11 PROCEDURE — G0378 HOSPITAL OBSERVATION PER HR: HCPCS

## 2020-06-11 PROCEDURE — 99220 PR INITIAL OBSERVATION CARE,LEVL III: CPT | Mod: 25 | Performed by: HOSPITALIST

## 2020-06-11 PROCEDURE — 86900 BLOOD TYPING SEROLOGIC ABO: CPT

## 2020-06-11 PROCEDURE — 96375 TX/PRO/DX INJ NEW DRUG ADDON: CPT

## 2020-06-11 RX ORDER — AMLODIPINE BESYLATE 5 MG/1
5 TABLET ORAL
Status: DISCONTINUED | OUTPATIENT
Start: 2020-06-11 | End: 2020-06-13 | Stop reason: HOSPADM

## 2020-06-11 RX ORDER — ONDANSETRON 4 MG/1
4 TABLET, ORALLY DISINTEGRATING ORAL EVERY 4 HOURS PRN
Status: DISCONTINUED | OUTPATIENT
Start: 2020-06-11 | End: 2020-06-13 | Stop reason: HOSPADM

## 2020-06-11 RX ORDER — ONDANSETRON 2 MG/ML
4 INJECTION INTRAMUSCULAR; INTRAVENOUS EVERY 4 HOURS PRN
Status: DISCONTINUED | OUTPATIENT
Start: 2020-06-11 | End: 2020-06-13 | Stop reason: HOSPADM

## 2020-06-11 RX ORDER — PANTOPRAZOLE SODIUM 40 MG/10ML
40 INJECTION, POWDER, LYOPHILIZED, FOR SOLUTION INTRAVENOUS 2 TIMES DAILY
Status: DISCONTINUED | OUTPATIENT
Start: 2020-06-11 | End: 2020-06-13 | Stop reason: HOSPADM

## 2020-06-11 RX ORDER — ACETAMINOPHEN 325 MG/1
650 TABLET ORAL EVERY 6 HOURS PRN
Status: DISCONTINUED | OUTPATIENT
Start: 2020-06-11 | End: 2020-06-13 | Stop reason: HOSPADM

## 2020-06-11 RX ADMIN — PANTOPRAZOLE SODIUM 40 MG: 40 INJECTION, POWDER, LYOPHILIZED, FOR SOLUTION INTRAVENOUS at 21:38

## 2020-06-11 ASSESSMENT — LIFESTYLE VARIABLES
SUBSTANCE_ABUSE: 0
ON A TYPICAL DAY WHEN YOU DRINK ALCOHOL HOW MANY DRINKS DO YOU HAVE: 0
HAVE PEOPLE ANNOYED YOU BY CRITICIZING YOUR DRINKING: NO
CONSUMPTION TOTAL: NEGATIVE
TOTAL SCORE: 0
EVER HAD A DRINK FIRST THING IN THE MORNING TO STEADY YOUR NERVES TO GET RID OF A HANGOVER: NO
ALCOHOL_USE: NO
TOTAL SCORE: 0
TOTAL SCORE: 0
EVER_SMOKED: NEVER
HOW MANY TIMES IN THE PAST YEAR HAVE YOU HAD 5 OR MORE DRINKS IN A DAY: 0
AVERAGE NUMBER OF DAYS PER WEEK YOU HAVE A DRINK CONTAINING ALCOHOL: 0
EVER FELT BAD OR GUILTY ABOUT YOUR DRINKING: NO
HAVE YOU EVER FELT YOU SHOULD CUT DOWN ON YOUR DRINKING: NO

## 2020-06-11 ASSESSMENT — ENCOUNTER SYMPTOMS
EYE PAIN: 0
HEMOPTYSIS: 0
SORE THROAT: 0
HALLUCINATIONS: 0
FLANK PAIN: 0
SINUS PAIN: 0
NAUSEA: 0
BACK PAIN: 0
STRIDOR: 0
MYALGIAS: 0
NECK PAIN: 0
BLURRED VISION: 0
WHEEZING: 0
BRUISES/BLEEDS EASILY: 0
FEVER: 0
CHILLS: 0
CLAUDICATION: 0
HEARTBURN: 0
POLYDIPSIA: 0
ABDOMINAL PAIN: 1
SPUTUM PRODUCTION: 0
PHOTOPHOBIA: 0
WEAKNESS: 0
FALLS: 0
CONSTIPATION: 0
BLOOD IN STOOL: 1
PND: 0
DIZZINESS: 0
SHORTNESS OF BREATH: 0
PALPITATIONS: 0
DIAPHORESIS: 0
DIARRHEA: 0
HEADACHES: 0
DOUBLE VISION: 0
COUGH: 0
ORTHOPNEA: 0
VOMITING: 0
DEPRESSION: 0
TREMORS: 0
TINGLING: 0

## 2020-06-11 ASSESSMENT — FIBROSIS 4 INDEX: FIB4 SCORE: 1.44

## 2020-06-11 NOTE — ED PROVIDER NOTES
ED Provider Note    Scribed for Miguel Sanchez M.D. by Fatoumata Cohen. 6/11/2020, 3:30 PM.    Primary care provider: Jm Manzo M.D.  Means of arrival: EMS  History obtained from: Patient  History limited by: None    CHIEF COMPLAINT  Chief Complaint   Patient presents with   • Rectal Bleeding     x3 episodes   • Abdominal Pain     lower abd       HPI  Beth Fuller is a 80 y.o. female, with a history of diverticular bleeding, who presents to the Emergency Department for rectal bleeding onset 3 hours ago. Patient states she went to use the restroom this morning and thought she had diarrhea, however when she checked, copious amounts of blood was coming out of her rectum. She reports having 3 episodes before reporting to the ED. No alleviating or exacerbating factors noted. Chart review indicates that patient was hospitalized for 3 days in 2013 for a diverticular bleed. She states her symptoms now are similar to when she was hospitalized in 2013. She is currently being followed by Dr. Pavon (GI) and had a normal colonoscopy last year. No additional pain or symptoms noted at this time.     REVIEW OF SYSTEMS  As above otherwise all other systems are negative    PAST MEDICAL HISTORY   has a past medical history of Anesthesia (1974/2002), Arthritis, Breast cancer (HCC), Cancer (HCC) (2006), CATARACT, Dental disorder, Diverticulitis, GERD (gastroesophageal reflux disease), Heart burn (12/17/13), Hypertension, Indigestion, MEDICAL HOME (6/9/2015), Osteoporosis, Pain, Personal history of venous thrombosis and embolism (12/31/2013), Pneumonia (1984), PVD (peripheral vascular disease) (HCC), and Unspecified hemorrhagic conditions.    SURGICAL HISTORY   has a past surgical history that includes appendectomy (1974); tonsillectomy and adenoidectomy (as child); hysterectomy, total abdominal (1974                        ); colonoscopy - endo (12/6/2013); vein stripping (1974/1999/2013); cataract extraction  "with iol (2003); knee arthroplasty total (12/30/2013); lumpectomy (2006); gastroscopy with biopsy (6/16/2014); egd w/endoscopic ultrasound (6/16/2014); radiation therapy plan simple (2006); knee arthroplasty total (Right, 6/8/2015); and ganglion excision (Right, 10/17/2019).    SOCIAL HISTORY  Social History     Tobacco Use   • Smoking status: Never Smoker   • Smokeless tobacco: Never Used   Substance Use Topics   • Alcohol use: No   • Drug use: No      Social History     Substance and Sexual Activity   Drug Use No       FAMILY HISTORY  Family History   Problem Relation Age of Onset   • Heart Disease Mother    • Hypertension Mother    • Stroke Mother    • Diabetes Mother    • Dementia Mother    • Arthritis Mother    • Diabetes Brother    • Dementia Brother    • Cancer Father         Colon   • Heart Disease Maternal Grandmother    • Kidney Disease Maternal Grandmother    • Heart Attack Maternal Grandfather    • No Known Problems Brother    • Hypertension Brother    • No Known Problems Brother        CURRENT MEDICATIONS  Home Medications     Reviewed by Charlene Izaguirre (Pharmacy Tech) on 06/11/20 at 1701  Med List Status: Complete   Medication Last Dose Status   amLODIPine (NORVASC) 5 MG Tab UNK Active   estradiol (ESTRACE) 0.1 MG/GM vaginal cream UNK Active                ALLERGIES  Allergies   Allergen Reactions   • Ibuprofen      Stomach upset   • Morphine Vomiting   • Other Misc      Metals: zinc oxide, vanadium chloride, manganese chloride       PHYSICAL EXAM  VITAL SIGNS: Ht 1.651 m (5' 5\")   Wt 51.3 kg (113 lb)   BMI 18.80 kg/m²     Constitutional: Well developed, Well nourished, No acute distress, Non-toxic appearance.   HENT: Normocephalic, Atraumatic, Bilateral external ears normal, oropharynx moist, No oral exudates, Nose normal.   Eyes:conjunctiva is normal, there are no signs of exudate.   Neck: Supple, no meningeal signs.  Lymphatic: No lymphadenopathy noted.   Cardiovascular: Regular rate and " rhythm without murmurs gallops or rubs.   Thorax & Lungs: No respiratory distress. Breathing comfortably. Lungs are clear to auscultation bilaterally, there are no wheezes no rales. Chest wall is nontender.  Abdomen: Soft, nontender, nondistended. Bowel sounds are present.   Rectal: Maroon colored stool present.   Skin: Warm, Dry, No erythema,   Back: No tenderness, No CVA tenderness.  Musculoskeletal: Good range of motion in all major joints. No tenderness to palpation or major deformities noted. Intact distal pulses, no clubbing, no cyanosis, no edema,   Neurologic: Alert & oriented x 3, Moving all extremities. No gross abnormalities.    Psychiatric: Affect normal, Judgment normal, Mood normal.     I verified that the patient was wearing a mask and I was wearing appropriate PPE every time I entered the room. The patient's mask was on the patient at all times during my encounter except for a brief view of the oropharynx.     LABS  Results for orders placed or performed during the hospital encounter of 06/11/20   COD (ADULT)   Result Value Ref Range    ABO Grouping Only O     Rh Grouping Only POS     Antibody Screen-Cod NEG    CBC WITH DIFFERENTIAL   Result Value Ref Range    WBC 3.8 (L) 4.8 - 10.8 K/uL    RBC 3.78 (L) 4.20 - 5.40 M/uL    Hemoglobin 12.4 12.0 - 16.0 g/dL    Hematocrit 35.3 (L) 37.0 - 47.0 %    MCV 93.4 81.4 - 97.8 fL    MCH 32.8 27.0 - 33.0 pg    MCHC 35.1 (H) 33.6 - 35.0 g/dL    RDW 42.7 35.9 - 50.0 fL    Platelet Count 209 164 - 446 K/uL    MPV 10.2 9.0 - 12.9 fL    Neutrophils-Polys 71.10 44.00 - 72.00 %    Lymphocytes 14.70 (L) 22.00 - 41.00 %    Monocytes 12.60 0.00 - 13.40 %    Eosinophils 0.50 0.00 - 6.90 %    Basophils 0.80 0.00 - 1.80 %    Immature Granulocytes 0.30 0.00 - 0.90 %    Nucleated RBC 0.00 /100 WBC    Neutrophils (Absolute) 2.70 2.00 - 7.15 K/uL    Lymphs (Absolute) 0.56 (L) 1.00 - 4.80 K/uL    Monos (Absolute) 0.48 0.00 - 0.85 K/uL    Eos (Absolute) 0.02 0.00 - 0.51 K/uL     Baso (Absolute) 0.03 0.00 - 0.12 K/uL    Immature Granulocytes (abs) 0.01 0.00 - 0.11 K/uL    NRBC (Absolute) 0.00 K/uL   COMP METABOLIC PANEL   Result Value Ref Range    Sodium 137 135 - 145 mmol/L    Potassium 4.2 3.6 - 5.5 mmol/L    Chloride 103 96 - 112 mmol/L    Co2 23 20 - 33 mmol/L    Anion Gap 11.0 7.0 - 16.0    Glucose 126 (H) 65 - 99 mg/dL    Bun 21 8 - 22 mg/dL    Creatinine 0.61 0.50 - 1.40 mg/dL    Calcium 8.4 (L) 8.5 - 10.5 mg/dL    AST(SGOT) 15 12 - 45 U/L    ALT(SGPT) 12 2 - 50 U/L    Alkaline Phosphatase 69 30 - 99 U/L    Total Bilirubin 0.4 0.1 - 1.5 mg/dL    Albumin 3.8 3.2 - 4.9 g/dL    Total Protein 6.0 6.0 - 8.2 g/dL    Globulin 2.2 1.9 - 3.5 g/dL    A-G Ratio 1.7 g/dL   LIPASE   Result Value Ref Range    Lipase 50 11 - 82 U/L   PROTHROMBIN TIME   Result Value Ref Range    PT 13.3 12.0 - 14.6 sec    INR 0.99 0.87 - 1.13   APTT   Result Value Ref Range    APTT 28.6 24.7 - 36.0 sec   ESTIMATED GFR   Result Value Ref Range    GFR If African American >60 >60 mL/min/1.73 m 2    GFR If Non African American >60 >60 mL/min/1.73 m 2   EKG   Result Value Ref Range    Report       [facility] Emergency Dept.    Test Date:  2020  Pt Name:    ABDULAZIZ SOW              Department: ER  MRN:        [id number]                      Room:       [facility]  Gender:     Female                       Technician: [id number]  :        [id number]                   Requested By:ALIZA IRENE  Order #:    [id number]                    Reading MD: ALIZA IRENE MD    Measurements  Intervals                                Axis  Rate:       69                           P:          57  ME:         220                          QRS:        -70  QRSD:       118                          T:          49  QT:         424  QTc:        455    Interpretive Statements  SINUS RHYTHM  FIRST DEGREE AV BLOCK  LEFT ANTERIOR FASCICULAR BLOCK  PROBABLE LEFT VENTRICULAR HYPERTROPHY  ANTERIOR Q WAVES, POSSIBLY DUE TO  LVH  Compared to ECG 05/26/2015 13:03:29  Left anterior fascicular block now present  Left ventricular hypertrophy now present  Q waves now present  Left bundle-bra nch block no longer present  Electronically Signed On 6- 16:14:09 PDT by ALIZA IRENE MD        All labs reviewed by me.    EKG  Interpreted by me as seen above.     RADIOLOGY  DX-CHEST-PORTABLE (1 VIEW)   Final Result      Stable chest with hyperinflation, aortic ectasia and cardiac silhouette enlargement. No evidence of edema        The radiologist's interpretation of all radiological studies have been reviewed by me.    COURSE & MEDICAL DECISION MAKING  Pertinent Labs & Imaging studies reviewed. (See chart for details)    3:30 PM - Patient seen and examined at bedside. I informed the patient the need for labs and radiology to rule out any emergent processes. Currently awaiting results before deciding if intervention is necessary. Patient verbalizes understanding and agreement to this plan of care. Ordered DX-chest, COD, CBC with diff, CMP, lipase, PT, APTT, estimated GFR, and EKG to evaluate her symptoms. The differential diagnoses include but are not limited to: lower GI bleed     4:15 PM - Reviewed labs and radiology results at this time.      4:18 PM - Patient was reevaluated at bedside. Discussed lab results with the patient and informed her that they were reassuring, however RBC count is low and we would like to keep her for further evaluation and treatment. She informed me she is having difficulty urinating and is requesting catheter at this time to help her urinate.     4:37 PM - I discussed the patient's case and the above findings with Dr. Garibay (Hospitalist) who agreed to evaluate patient for hospitalization. Patients care will be transferred at this time.     5:32 PM - Informed me patient had another extensive episode and is still bleeding. 1,000 cc's were drained from her bladder.         Decision Making:  Presents emerge  department for evaluation.  Clinically patient is hemodynamically stable.  Patient was very uncomfortable because she is unable to urinate because she feels discomfort so catheter was placed proximally thousand cc was drained.  The patient was improved after that.  The patient did have another bowel movement in the emergency department of wound in nature.  At this point I do feel it is a lower GI bleed.  She is otherwise hemodynamically stable hemoglobin is above.  At this point we will admit the patient for further treatment and care of spoken the hospitalist for this admission.      DISPOSITION:  Patient will be hospitalized by Dr. Garibay in guarded condition.      FINAL IMPRESSION  1. Lower GI bleed          Fatoumata BRANTLEY (Hannyibolya), am scribing for, and in the presence of, Miguel Sanchez M.D..    Electronically signed by: Fatoumata Cohen (Naty), 6/11/2020    Miguel BRANTLEY M.D. personally performed the services described in this documentation, as scribed by Fatoumata Cohen in my presence, and it is both accurate and complete. C.    The note accurately reflects work and decisions made by me.  Miguel Sanchez M.D.  6/11/2020  7:51 PM

## 2020-06-12 LAB
ALBUMIN SERPL BCP-MCNC: 3.2 G/DL (ref 3.2–4.9)
ALBUMIN/GLOB SERPL: 1.7 G/DL
ALP SERPL-CCNC: 50 U/L (ref 30–99)
ALT SERPL-CCNC: 12 U/L (ref 2–50)
ANION GAP SERPL CALC-SCNC: 7 MMOL/L (ref 7–16)
AST SERPL-CCNC: 13 U/L (ref 12–45)
BASOPHILS # BLD AUTO: 0.8 % (ref 0–1.8)
BASOPHILS # BLD: 0.03 K/UL (ref 0–0.12)
BILIRUB SERPL-MCNC: 0.6 MG/DL (ref 0.1–1.5)
BUN SERPL-MCNC: 21 MG/DL (ref 8–22)
CALCIUM SERPL-MCNC: 8.2 MG/DL (ref 8.5–10.5)
CHLORIDE SERPL-SCNC: 105 MMOL/L (ref 96–112)
CO2 SERPL-SCNC: 25 MMOL/L (ref 20–33)
COVID ORDER STATUS COVID19: NORMAL
CREAT SERPL-MCNC: 0.61 MG/DL (ref 0.5–1.4)
EOSINOPHIL # BLD AUTO: 0.04 K/UL (ref 0–0.51)
EOSINOPHIL NFR BLD: 1 % (ref 0–6.9)
ERYTHROCYTE [DISTWIDTH] IN BLOOD BY AUTOMATED COUNT: 42.5 FL (ref 35.9–50)
GLOBULIN SER CALC-MCNC: 1.9 G/DL (ref 1.9–3.5)
GLUCOSE SERPL-MCNC: 100 MG/DL (ref 65–99)
HCT VFR BLD AUTO: 28.4 % (ref 37–47)
HGB BLD-MCNC: 9 G/DL (ref 12–16)
HGB BLD-MCNC: 9.1 G/DL (ref 12–16)
HGB BLD-MCNC: 9.4 G/DL (ref 12–16)
HGB BLD-MCNC: 9.6 G/DL (ref 12–16)
HGB BLD-MCNC: 9.9 G/DL (ref 12–16)
IMM GRANULOCYTES # BLD AUTO: 0.01 K/UL (ref 0–0.11)
IMM GRANULOCYTES NFR BLD AUTO: 0.3 % (ref 0–0.9)
LYMPHOCYTES # BLD AUTO: 0.67 K/UL (ref 1–4.8)
LYMPHOCYTES NFR BLD: 16.9 % (ref 22–41)
MCH RBC QN AUTO: 32.5 PG (ref 27–33)
MCHC RBC AUTO-ENTMCNC: 34.4 G/DL (ref 33.6–35)
MCV RBC AUTO: 94.4 FL (ref 81.4–97.8)
MONOCYTES # BLD AUTO: 0.46 K/UL (ref 0–0.85)
MONOCYTES NFR BLD AUTO: 11.6 % (ref 0–13.4)
NEUTROPHILS # BLD AUTO: 2.76 K/UL (ref 2–7.15)
NEUTROPHILS NFR BLD: 69.4 % (ref 44–72)
NRBC # BLD AUTO: 0 K/UL
NRBC BLD-RTO: 0 /100 WBC
PLATELET # BLD AUTO: 190 K/UL (ref 164–446)
PMV BLD AUTO: 10.4 FL (ref 9–12.9)
POTASSIUM SERPL-SCNC: 3.9 MMOL/L (ref 3.6–5.5)
PROT SERPL-MCNC: 5.1 G/DL (ref 6–8.2)
RBC # BLD AUTO: 3.02 M/UL (ref 4.2–5.4)
SARS-COV-2 RNA RESP QL NAA+PROBE: NOTDETECTED
SODIUM SERPL-SCNC: 137 MMOL/L (ref 135–145)
SPECIMEN SOURCE: NORMAL
WBC # BLD AUTO: 4 K/UL (ref 4.8–10.8)

## 2020-06-12 PROCEDURE — C9113 INJ PANTOPRAZOLE SODIUM, VIA: HCPCS | Performed by: HOSPITALIST

## 2020-06-12 PROCEDURE — 85025 COMPLETE CBC W/AUTO DIFF WBC: CPT

## 2020-06-12 PROCEDURE — 36415 COLL VENOUS BLD VENIPUNCTURE: CPT

## 2020-06-12 PROCEDURE — A9270 NON-COVERED ITEM OR SERVICE: HCPCS | Performed by: HOSPITALIST

## 2020-06-12 PROCEDURE — 85018 HEMOGLOBIN: CPT | Mod: 91,XU

## 2020-06-12 PROCEDURE — 80053 COMPREHEN METABOLIC PANEL: CPT

## 2020-06-12 PROCEDURE — 700105 HCHG RX REV CODE 258: Performed by: INTERNAL MEDICINE

## 2020-06-12 PROCEDURE — 99226 PR SUBSEQUENT OBSERVATION CARE,LEVEL III: CPT | Performed by: FAMILY MEDICINE

## 2020-06-12 PROCEDURE — 97161 PT EVAL LOW COMPLEX 20 MIN: CPT

## 2020-06-12 PROCEDURE — 700101 HCHG RX REV CODE 250: Performed by: INTERNAL MEDICINE

## 2020-06-12 PROCEDURE — 700102 HCHG RX REV CODE 250 W/ 637 OVERRIDE(OP): Performed by: HOSPITALIST

## 2020-06-12 PROCEDURE — 96376 TX/PRO/DX INJ SAME DRUG ADON: CPT

## 2020-06-12 PROCEDURE — 700111 HCHG RX REV CODE 636 W/ 250 OVERRIDE (IP): Performed by: HOSPITALIST

## 2020-06-12 PROCEDURE — G0378 HOSPITAL OBSERVATION PER HR: HCPCS

## 2020-06-12 PROCEDURE — 97165 OT EVAL LOW COMPLEX 30 MIN: CPT

## 2020-06-12 PROCEDURE — C9803 HOPD COVID-19 SPEC COLLECT: HCPCS | Performed by: FAMILY MEDICINE

## 2020-06-12 PROCEDURE — U0004 COV-19 TEST NON-CDC HGH THRU: HCPCS

## 2020-06-12 RX ORDER — SODIUM CHLORIDE 9 MG/ML
INJECTION, SOLUTION INTRAVENOUS CONTINUOUS
Status: DISCONTINUED | OUTPATIENT
Start: 2020-06-12 | End: 2020-06-13 | Stop reason: HOSPADM

## 2020-06-12 RX ADMIN — PANTOPRAZOLE SODIUM 40 MG: 40 INJECTION, POWDER, LYOPHILIZED, FOR SOLUTION INTRAVENOUS at 18:29

## 2020-06-12 RX ADMIN — ACETAMINOPHEN 650 MG: 325 TABLET, FILM COATED ORAL at 15:45

## 2020-06-12 RX ADMIN — POLYETHYLENE GLYCOL 3350, SODIUM SULFATE ANHYDROUS, SODIUM BICARBONATE, SODIUM CHLORIDE, POTASSIUM CHLORIDE 4 L: 236; 22.74; 6.74; 5.86; 2.97 POWDER, FOR SOLUTION ORAL at 18:15

## 2020-06-12 RX ADMIN — SODIUM CHLORIDE: 9 INJECTION, SOLUTION INTRAVENOUS at 02:17

## 2020-06-12 RX ADMIN — SODIUM CHLORIDE: 9 INJECTION, SOLUTION INTRAVENOUS at 15:44

## 2020-06-12 RX ADMIN — PANTOPRAZOLE SODIUM 40 MG: 40 INJECTION, POWDER, LYOPHILIZED, FOR SOLUTION INTRAVENOUS at 05:00

## 2020-06-12 RX ADMIN — ONDANSETRON 4 MG: 4 TABLET, ORALLY DISINTEGRATING ORAL at 15:44

## 2020-06-12 RX ADMIN — SODIUM CHLORIDE: 9 INJECTION, SOLUTION INTRAVENOUS at 22:00

## 2020-06-12 ASSESSMENT — ENCOUNTER SYMPTOMS
SPUTUM PRODUCTION: 0
COUGH: 0
BLURRED VISION: 0
HEMOPTYSIS: 0
CHILLS: 0
TINGLING: 0
FEVER: 0
VOMITING: 0
DIZZINESS: 0
HEADACHES: 0
ORTHOPNEA: 0
NAUSEA: 0
PHOTOPHOBIA: 0
MYALGIAS: 0
NECK PAIN: 0
DEPRESSION: 0
PALPITATIONS: 0
BLOOD IN STOOL: 1
HEARTBURN: 0
ABDOMINAL PAIN: 1
BACK PAIN: 0
DOUBLE VISION: 0
DIARRHEA: 1

## 2020-06-12 ASSESSMENT — COGNITIVE AND FUNCTIONAL STATUS - GENERAL
MOVING FROM LYING ON BACK TO SITTING ON SIDE OF FLAT BED: A LITTLE
STANDING UP FROM CHAIR USING ARMS: A LITTLE
MOVING TO AND FROM BED TO CHAIR: A LITTLE
SUGGESTED CMS G CODE MODIFIER DAILY ACTIVITY: CH
WALKING IN HOSPITAL ROOM: A LITTLE
DAILY ACTIVITIY SCORE: 24
CLIMB 3 TO 5 STEPS WITH RAILING: A LITTLE
SUGGESTED CMS G CODE MODIFIER MOBILITY: CK
TURNING FROM BACK TO SIDE WHILE IN FLAT BAD: A LITTLE
MOBILITY SCORE: 18

## 2020-06-12 ASSESSMENT — LIFESTYLE VARIABLES: SUBSTANCE_ABUSE: 0

## 2020-06-12 ASSESSMENT — GAIT ASSESSMENTS
DISTANCE (FEET): 110
DEVIATION: DECREASED BASE OF SUPPORT
ASSISTIVE DEVICE: HAND HELD ASSIST
GAIT LEVEL OF ASSIST: MINIMAL ASSIST

## 2020-06-12 ASSESSMENT — ACTIVITIES OF DAILY LIVING (ADL): TOILETING: INDEPENDENT

## 2020-06-12 NOTE — PROGRESS NOTES
· 2 RN skin check complete with VICTORINA Vance.   · Devices in place: PIV, glasses.  · Skin assessed under devices: yes.  · Confirmed pressure ulcers found on: Na.  · New potential pressure ulcers noted on: NA. Wound consult placed? NA. Photo uploaded? NA.   · The following interventions are in place: patient turns self, pillows.

## 2020-06-12 NOTE — PROGRESS NOTES
Bedside report received from Dilcia PRAJAPATI . Assumed care of pt at 0645 . Pt is awake at this time with no signs of distress. Plan of care discussed with pt. Pt is A&O x 4. Pt is on RA. Bed alarm is on, bed in lowest position, bed rails up x 2, belongings and call light within reach. Hourly rounding in place.

## 2020-06-12 NOTE — ED NOTES
Patient had another large bloody bowel movement. Patient cleaned up and ERP updated. Patient straight cathed per ERP orders. 1,000 cc drained from bladder.

## 2020-06-12 NOTE — ED TRIAGE NOTES
"Chief Complaint   Patient presents with   • Rectal Bleeding     x3 episodes   • Abdominal Pain     lower abd       BIBA for above complaint. Patient started bleeding at 1200 today went to  and was sent here. Patient presents with a large bloody bowel movement. Patient cleaned. A+Ox4. VSS. Iv established and labs sent. Patient has a history of GI bleed but not as bad as this time.     Blood Pressure : 121/63, NIBP MAP (Calculated): 85, Pulse: 69, Respiration: 19, Temperature: 36.7 °C (98.1 °F), Height: 165.1 cm (5' 5\"), Weight: 51.3 kg (113 lb), BMI (Calculated): 18.8, BSA (Calculated): 1.5, Pulse Oximetry: 97 %, O2 (LPM): 0, O2 Delivery Device: None - Room Air    "

## 2020-06-12 NOTE — PROGRESS NOTES
Informed MD on call regarding 3 x bloody stool and latest hgb at 9.6 from previous hgb at 1500 12.4 . Patient c/o abdominal pain but refused to take Tylenol due to empty stomach since yesterday. V/s stable. With order made and carried out. Will continue to monitor bleeding per rectum.

## 2020-06-12 NOTE — PROGRESS NOTES
called in regards to patient deciding that she wants to do the colonoscopy tomorrow. Orders placed for golytely and pt will be NPO at midnight.

## 2020-06-12 NOTE — THERAPY
Occupational Therapy   Initial Evaluation     Patient Name: Beth Fuller  Age:  80 y.o., Sex:  female  Medical Record #: [id number]  Today's Date: 6/12/2020          Assessment  Patient is 80 y.o. female with a diagnosis of GIB.Patient is currently not being actively followed for occupational therapy services at this time. However, pt may be seen at the request of attending provider for an additional visit to address any discharge or equipment needs within 30 days from evaluation.    Plan     Occupational Therapy for Evaluation only    Discharge recommendations:  No further OT needs              06/12/20 0821   Prior Living Situation   Prior Services None   Housing / Facility [address] [facility]   Lives with - Patient's Self Care Capacity Adult Children  (lives in mother n law apt on her [facility] property)   Prior Level of ADL Function   Self Feeding Independent   Grooming / Hygiene Independent   Bathing Independent   Dressing Independent   Toileting Independent   ADL Assessment   Upper Body Dressing Supervision   Lower Body Dressing Supervision   Toileting Supervision   Functional Mobility   Sit to Stand Supervised   Bed, Chair, Wheelchair Transfer Supervised   Toilet Transfers Supervised   Session Information   Priority 0

## 2020-06-12 NOTE — PROGRESS NOTES
[facility] Medicine Daily Progress Note    Date of Service  6/12/2020    Chief Complaint  80 y.o. female admitted 6/11/2020 with lower GI bleed.    Hospital Course  This is 80 years old female who has past medical history of hypertension and history of GI bleed due to diverticular disease comes in with lower GI bleed.  Apparently patient was in her usual state of health until the day prior to admission when she felt abdominal cramps and had large loose bowel movement that looked maroon in color.  She continued to have those episodes few times at home then she decided to present to the emergency room for further evaluation.  While in the ER she had also more episodes.  Upon arrival she was hemodynamically stable.  Her hemoglobin initially was 9.9 g/dL but this is dropped from 12.4 g/dL on 6/11/2020.  Patient continued to have bloody stools and H&H continue to slowly trend down.  Was started on IV PPI in the ER.  Apparently patient has recently had upper and lower endoscopy done in April of this year which showed diverticular disease and unremarkable EGD.  Patient however was hospitalized in 2013 with significant diverticular bleeding.  She stated that she struggled with lower GI bleed due to diverticular bleed since she was in her 30s.  She denied any chest pain or shortness of breath.  Denied any dizziness or lightheadedness.      Interval Problem Update  Lying in bed comfortably.  No acute distress noted.  Hemodynamically stable.  Denies any chest pain or shortness of breath.  No dizziness or lightheadedness.  Had 3 bloody bowel movements this morning.  Hemoglobin dropped from 9.6 to 9.1 g/dL this morning.  Denies any nausea or vomiting.  Complains of left upper quadrant pain.  I discussed the case with Dr. bapitste from GI who will see the patient.    Consultants/Specialty  GI    Code Status  Full code    Disposition  Likely home once medically cleared    Review of Systems  Review of Systems   Constitutional:  Negative for chills and fever.   HENT: Negative for ear pain, hearing loss and tinnitus.    Eyes: Negative for blurred vision, double vision and photophobia.   Respiratory: Negative for cough, hemoptysis and sputum production.    Cardiovascular: Negative for chest pain, palpitations and orthopnea.   Gastrointestinal: Positive for abdominal pain, blood in stool and diarrhea. Negative for heartburn, nausea and vomiting.   Genitourinary: Negative for dysuria, frequency and urgency.   Musculoskeletal: Negative for back pain, myalgias and neck pain.   Skin: Negative for rash.   Neurological: Negative for dizziness, tingling and headaches.   Psychiatric/Behavioral: Negative for depression, substance abuse and suicidal ideas.        Physical Exam  Temp:  [36.7 °C (98.1 °F)-37.1 °C (98.7 °F)] 36.9 °C (98.5 °F)  Pulse:  [67-90] 75  Resp:  [16-20] 17  BP: (113-186)/() 120/78  SpO2:  [96 %-98 %] 97 %    Physical Exam  Constitutional:       General: She is not in acute distress.     Comments: Frail   HENT:      Head: Normocephalic and atraumatic.      Nose: No rhinorrhea.      Mouth/Throat:      Mouth: Mucous membranes are dry.   Eyes:      Extraocular Movements: Extraocular movements intact.      Pupils: Pupils are equal, round, and reactive to light.   Cardiovascular:      Rate and Rhythm: Normal rate and regular rhythm.      Heart sounds: No friction rub. No gallop.    Pulmonary:      Effort: Pulmonary effort is normal. No respiratory distress.      Breath sounds: No wheezing.   Abdominal:      General: There is no distension.      Palpations: Abdomen is soft.      Tenderness: There is abdominal tenderness in the left upper quadrant.   Skin:     Coloration: Skin is pale. Skin is not jaundiced.   Neurological:      Mental Status: She is alert.      Cranial Nerves: No cranial nerve deficit.   Psychiatric:         Mood and Affect: Mood normal.         Behavior: Behavior normal.         Fluids    Intake/Output Summary  (Last 24 hours) at 6/12/2020 1151  Last data filed at 6/11/2020 2025  Gross per 24 hour   Intake 210 ml   Output 1002 ml   Net -792 ml       Laboratory  Recent Labs     06/11/20  1511 06/12/20  0009 06/12/20  0250 06/12/20  0611   WBC 3.8* 4.0*  --   --    RBC 3.78* 3.02*  --   --    HEMOGLOBIN 12.4 9.9* 9.6* 9.1*   HEMATOCRIT 35.3* 28.4*  --   --    MCV 93.4 94.4  --   --    MCH 32.8 32.5  --   --    MCHC 35.1* 34.4  --   --    RDW 42.7 42.5  --   --    PLATELETCT 209 190  --   --    MPV 10.2 10.4  --   --      Recent Labs     06/11/20  1511 06/12/20  0009   SODIUM 137 137   POTASSIUM 4.2 3.9   CHLORIDE 103 105   CO2 23 25   GLUCOSE 126* 100*   BUN 21 21   CREATININE 0.61 0.61   CALCIUM 8.4* 8.2*     Recent Labs     06/11/20  1511 06/11/20  2112   APTT 28.6 29.6   INR 0.99 1.11               Imaging  DX-CHEST-PORTABLE (1 VIEW)   Final Result      Stable chest with hyperinflation, aortic ectasia and cardiac silhouette enlargement. No evidence of edema           Assessment/Plan  * GI bleed  Assessment & Plan  Likely lower source since bright red blood  Patient has been started on IV fluid hydration  Patient is started on IV Protonix  Monitor H&H every 8 hours, transfuse for hemoglobin less than 7  Coagulation studies within normal limits  6/12: Apparently patient had few episodes of maroon color bowel movements at home and few while here in the hospital.  Hemoglobin was 12.5 g/dL on 6/11/2020 and now dropped to 9.1 g/dL.  Apparently had recent upper and lower scope done by Dr. Meyers April of this year which showed diverticular disease and unremarkable EGD however there is no records available for review.  Discussed the case with GI Dr. Lesly garcia see the patient.  Plan      Hypertension- (present on admission)  Assessment & Plan  Uncontrolled  Continue current home medications  IV as needed medications have been ordered      Gastroesophageal reflux disease without esophagitis- (present on admission)  Assessment &  Plan  On IV protonix       VTE prophylaxis: SCDs.  Anticoagulation contraindicated in the light of GI bleed.

## 2020-06-12 NOTE — THERAPY
Physical Therapy   Initial Evaluation     Patient Name: Beth Fuller  Age:  80 y.o., Sex:  female  Medical Record #: [id number]  Today's Date: 6/12/2020     Precautions: Fall Risk    Assessment  Patient is 80 y.o. female admitted for GI bleed workup and presents just below her functional baseline. She was able to manage herself to EOB but required min A for sit to stand and for x110' of gait with HHA. Anticipate pt to self-progress as she continues to mobility such that she will be functionally capable of return home. PT will follow to address limited activity tolerance and instability.     Plan    Recommend Physical Therapy 3 times per week until therapy goals are met for the following treatments:  Bed Mobility, Equipment, Gait Training, Neuro Re-Education / Balance, Self Care/Home Evaluation, Sensory Integration Techniques, Stair Training, Therapeutic Activities and Therapeutic Exercises    Discharge recommendations:  Recommend home health for continued physical therapy services.        Objective       06/12/20 0855   Prior Living Situation   Prior Services None   Housing / Facility 1 [address] / [employer]   Equipment Owned Single Point Cane   Lives with - Patient's Self Care Capacity Other (Comments)   Comments pt reports she lives in a [employer] unit next to her daughter's home.    Prior Level of Functional Mobility   Bed Mobility Independent   Transfer Status Independent   Ambulation Independent   Distance Ambulation (Feet)   (community)   Assistive Devices Used None   Stairs Independent   Gait Analysis   Gait Level Of Assist Minimal Assist   Assistive Device Hand Held Assist   Distance (Feet) 110   Comments pt reporting instability from time down and likely blood loss   Bed Mobility    Supine to Sit Supervised   Scooting Supervised   Functional Mobility   Sit to Stand Minimal Assist   Bed, Chair, Wheelchair Transfer Minimal Assist   Transfer Method Stand Step   Short Term Goals    Short Term Goal # 1 Pt  will perform transfers at spv without device in 6tx to improve functional independence.    Short Term Goal # 2 Pt will perform gait x150' without device at spv in 6tx to improve funcitonal mobility.    Anticipated Discharge Equipment   DC Equipment Unable To Determine At This Time

## 2020-06-12 NOTE — DIETARY
Nutrition services: Day 1 of admit.  79 yo female admitted with GI bleed.  Consult for low BMI.    Evaluation:  1. BMI 18.8 with stand up scale weight 51.256 (113#) on admit.  2. Weight unchanged from October 2019 admit.  3. Continues with bloody BM this morning  4. Currently NPO awaiting GI consult.    Malnutrition risk: na    Recommendations/Plan:  1. Start diet as appropriate.   2. Encourage intake of meals. Order supplements if intake is less than 50% of most meals.   document intake of all meals and supplements as % taken in ADL's to provide interdisciplinary communication across all shifts   When diet begins pt will be contacted by nutrition representative daily for meal and snack preferences.

## 2020-06-12 NOTE — H&P
Hospital Medicine History & Physical Note    Date of Service  2020    Primary Care Physician  Jm Manzo M.D.    Code Status  Full Code    Chief Complaint  Chief Complaint   Patient presents with   • Rectal Bleeding     x3 episodes   • Abdominal Pain     lower abd       History of Presenting Illness  80 y.o. female who presented 2020 with medical history of acid reflux, diverticulosis, history of hypertension comes into the emergency room with complaints of bright red blood per rectum since this morning.  Is associated with left lower abdominal cramping feeling that radiates to the center of the abdomen.  Pain is nonexertional and non-positional.  It is worse when she has a bowel movement.  She had 2 episodes of maroon-colored stools in the ER.  Patient recently had a colonoscopy by Dr. Ace, that found diverticulosis and a large polyp.  She does have a family history of colon cancer which her father  from.  Patient denies ibuprofen use, smoking and ETOH use. Patient denies any fever, nausea, vomiting, shortness of breath, lightheadedness or dizziness.  Patient arrived to the hospital with normal vital signs  Chest x-ray interpreted by me found no acute pulmonary process  EKG interpreted by me found normal sinus rhythm, left anterior fascicular block, left bundle branch block, first-degree AV block    Review of Systems  Review of Systems   Constitutional: Negative for chills, diaphoresis, fever and malaise/fatigue.   HENT: Negative for congestion, ear discharge, ear pain, hearing loss, nosebleeds, sinus pain, sore throat and tinnitus.    Eyes: Negative for blurred vision, double vision, photophobia and pain.   Respiratory: Negative for cough, hemoptysis, sputum production, shortness of breath, wheezing and stridor.    Cardiovascular: Negative for chest pain, palpitations, orthopnea, claudication, leg swelling and PND.   Gastrointestinal: Positive for abdominal pain and blood in stool. Negative  for constipation, diarrhea, heartburn, melena, nausea and vomiting.   Genitourinary: Negative for dysuria, flank pain, frequency, hematuria and urgency.   Musculoskeletal: Negative for back pain, falls, joint pain, myalgias and neck pain.   Skin: Negative for itching and rash.   Neurological: Negative for dizziness, tingling, tremors, weakness and headaches.   Endo/Heme/Allergies: Negative for environmental allergies and polydipsia. Does not bruise/bleed easily.   Psychiatric/Behavioral: Negative for depression, hallucinations, substance abuse and suicidal ideas.       Past Medical History   has a past medical history of Anesthesia (1974/2002), Arthritis, Breast cancer (HCC), Cancer ([facility]) (2006), CATARACT, Dental disorder, Diverticulitis, GERD (gastroesophageal reflux disease), Heart burn (12/17/13), Hypertension, Indigestion, MEDICAL HOME (6/9/2015), Osteoporosis, Pain, Personal history of venous thrombosis and embolism (12/31/2013), Pneumonia (1984), PVD (peripheral vascular disease) (HCC), and Unspecified hemorrhagic conditions.    Surgical History   has a past surgical history that includes appendectomy (1974); tonsillectomy and adenoidectomy (as child); hysterectomy, total abdominal (1974                        ); colonoscopy - endo (12/6/2013); vein stripping (1974/1999/2013); cataract extraction with iol (2003); knee arthroplasty total (12/30/2013); lumpectomy (2006); gastroscopy with biopsy (6/16/2014); egd w/endoscopic ultrasound (6/16/2014); pr radiation therapy plan simple (2006); knee arthroplasty total (Right, 6/8/2015); and ganglion excision (Right, 10/17/2019).     Family History  family history includes Arthritis in her mother; Cancer in her father; Dementia in her brother and mother; Diabetes in her brother and mother; Heart Attack in her maternal grandfather; Heart Disease in her maternal grandmother and mother; Hypertension in her brother and mother; Kidney Disease in her maternal grandmother; No  Known Problems in her brother and brother; Stroke in her mother.     Social History   reports that she has never smoked. She has never used smokeless tobacco. She reports that she does not drink alcohol or use drugs.    Allergies  Allergies   Allergen Reactions   • Ibuprofen      Stomach upset   • Morphine Vomiting   • Other Misc      Metals: zinc oxide, vanadium chloride, manganese chloride       Medications  Prior to Admission Medications   Prescriptions Last Dose Informant Patient Reported? Taking?   amLODIPine (NORVASC) 5 MG Tab UNK at [facility] Patient's Home Pharmacy No No   Sig: TAKE ONE TABLET BY MOUTH ONCE DAILY   estradiol (ESTRACE) 0.1 MG/GM vaginal cream UNK at [facility] Patient's Home Pharmacy No No   Sig: insert 1 gram vaginally three times per week as directed      Facility-Administered Medications: None       Physical Exam  Temp:  [36.7 °C (98.1 °F)-37.1 °C (98.7 °F)] 36.8 °C (98.3 °F)  Pulse:  [67-90] 70  Resp:  [16-20] 17  BP: (113-186)/() 120/70  SpO2:  [96 %-98 %] 97 %    Physical Exam  Vitals signs and nursing note reviewed.   Constitutional:       General: She is not in acute distress.     Appearance: Normal appearance. She is not ill-appearing, toxic-appearing or diaphoretic.   HENT:      Head: Normocephalic and atraumatic.      Nose: No congestion or rhinorrhea.      Mouth/Throat:      Pharynx: No oropharyngeal exudate or posterior oropharyngeal erythema.   Eyes:      General: No scleral icterus.  Neck:      Musculoskeletal: No neck rigidity or muscular tenderness.      Vascular: No carotid bruit.   Cardiovascular:      Rate and Rhythm: Normal rate and regular rhythm.      Pulses: Normal pulses.      Heart sounds: Normal heart sounds. No murmur. No friction rub. No gallop.    Pulmonary:      Effort: Pulmonary effort is normal. No respiratory distress.      Breath sounds: Normal breath sounds. No stridor. No wheezing or rhonchi.   Abdominal:      General: Abdomen is flat. There is no distension.       Palpations: There is no mass.      Tenderness: There is no abdominal tenderness. There is no left CVA tenderness, guarding or rebound.      Hernia: No hernia is present.   Musculoskeletal: Normal range of motion.         General: No swelling.      Right lower leg: No edema.      Left lower leg: No edema.   Lymphadenopathy:      Cervical: No cervical adenopathy.   Skin:     General: Skin is warm and dry.      Capillary Refill: Capillary refill takes more than 3 seconds.      Coloration: Skin is not jaundiced or pale.      Findings: No bruising or erythema.   Neurological:      Mental Status: She is alert.         Laboratory:  Recent Labs     06/11/20  1511 06/12/20  0009   WBC 3.8* 4.0*   RBC 3.78* 3.02*   HEMOGLOBIN 12.4 9.9*   HEMATOCRIT 35.3* 28.4*   MCV 93.4 94.4   MCH 32.8 32.5   MCHC 35.1* 34.4   RDW 42.7 42.5   PLATELETCT 209 190   MPV 10.2 10.4     Recent Labs     06/11/20  1511 06/12/20  0009   SODIUM 137 137   POTASSIUM 4.2 3.9   CHLORIDE 103 105   CO2 23 25   GLUCOSE 126* 100*   BUN 21 21   CREATININE 0.61 0.61   CALCIUM 8.4* 8.2*     Recent Labs     06/11/20  1511 06/12/20  0009   ALTSGPT 12 12   ASTSGOT 15 13   ALKPHOSPHAT 69 50   TBILIRUBIN 0.4 0.6   LIPASE 50  --    GLUCOSE 126* 100*     Recent Labs     06/11/20  1511 06/11/20  2112   APTT 28.6 29.6   INR 0.99 1.11     No results for input(s): NTPROBNP in the last 72 hours.      No results for input(s): TROPONINT in the last 72 hours.    Imaging:  DX-CHEST-PORTABLE (1 VIEW)   Final Result      Stable chest with hyperinflation, aortic ectasia and cardiac silhouette enlargement. No evidence of edema            Assessment/Plan:    * GI bleed  Assessment & Plan  Likely lower source since bright red blood  Patient has been started on IV fluid hydration  Patient is started on IV Protonix  Monitor H&H every 8 hours, transfuse for hemoglobin less than 7  Coagulation studies within normal limits  We will consult GI in the morning for endoscopic  evaluation      Hypertension- (present on admission)  Assessment & Plan  Uncontrolled  Continue current home medications  IV as needed medications have been ordered      Gastroesophageal reflux disease without esophagitis- (present on admission)  Assessment & Plan  On IV protonix        I discussed advance care planning for at least 20 minutes with the patient, including diagnosis, prognosis, plan of care, risks and benefits of any therapies that could be offered, as well as alternatives including palliation and hospice, as appropriate. The patient has opted Full code. Time spent is exclusive of evaluation and management or other separately billable procedures. Start time: 5:00pm End time:5:20pm

## 2020-06-12 NOTE — ED NOTES
.Report given to Alejandra PRAJAPATI, plan of care made clear, no further concerns at present time pt stable for transport.

## 2020-06-12 NOTE — PROGRESS NOTES
"Received alert and oriented x 4. Check vitals sign and recorded accordingly and due med given per MAR. Monitor sign and symptoms of respiratory distress and treatment given accordingly per MAR.Medicated per MAR and reassessed every 2 hours per protocol. Call light within reach. Bed alarm in placed. Needs attended. Will continue to monitor./70   Pulse 70   Temp 36.8 °C (98.3 °F) (Temporal)   Resp 17   Ht 1.651 m (5' 5\")   Wt 51.3 kg (113 lb)   SpO2 97%   BMI 18.80 kg/m² .c/o mild abdominal cramping and passing blood per rectum. Refused to take Tylenol for pain. Denies dizziness or light headedness when getting out of bed. Ambulated to BSC and able to urinate freely with adequate amount with out any difficulties.  "

## 2020-06-12 NOTE — PROGRESS NOTES
Assumed care of pt at 1815. Report received and bedside rounding completed with night RN. Pt is calm no SOB, or in any acute distress noted.    Pt is considered a fall risk. edu provided on risk level.   Fall precautions in place,  bed alarm. - Treaded non slip socks. Bed locked. Communication board updated with POC. Call light and pt belongings within reach - pt makes needs known - hourly rounding in place. See flowsheets for further assessment.     Patient arrived to room. AOx4, having bloody stool. Patient cleaned up, skin check competed.

## 2020-06-12 NOTE — ED NOTES
Med rec completed per pt's home pharmacy ([facility])  Unable to reach pt   Allergies reviewed  No PO antibiotics in the last 14 days  Unsure if pt takes any OTC medications

## 2020-06-12 NOTE — ASSESSMENT & PLAN NOTE
Likely lower source since bright red blood  Patient has been started on IV fluid hydration  Patient is started on IV Protonix  Monitor H&H every 8 hours, transfuse for hemoglobin less than 7  Coagulation studies within normal limits  6/12: Apparently patient had few episodes of maroon color bowel movements at home and few while here in the hospital.  Hemoglobin was 12.5 g/dL on 6/11/2020 and now dropped to 9.1 g/dL.  Apparently had recent upper and lower scope done by Dr. Meyers April of this year which showed diverticular disease and unremarkable EGD however there is no records available for review.  Discussed the case with GI Dr. Grace sick see the patient.  Plan

## 2020-06-13 ENCOUNTER — ANESTHESIA EVENT (OUTPATIENT)
Dept: SURGERY | Facility: MEDICAL CENTER | Age: 80
End: 2020-06-13
Payer: MEDICARE

## 2020-06-13 ENCOUNTER — ANESTHESIA (OUTPATIENT)
Dept: SURGERY | Facility: MEDICAL CENTER | Age: 80
End: 2020-06-13
Payer: MEDICARE

## 2020-06-13 VITALS
WEIGHT: 112.66 LBS | TEMPERATURE: 98.9 F | HEART RATE: 82 BPM | DIASTOLIC BLOOD PRESSURE: 66 MMHG | OXYGEN SATURATION: 97 % | SYSTOLIC BLOOD PRESSURE: 115 MMHG | RESPIRATION RATE: 17 BRPM | HEIGHT: 65 IN | BODY MASS INDEX: 18.77 KG/M2

## 2020-06-13 LAB
CHOLEST SERPL-MCNC: 131 MG/DL (ref 100–199)
HDLC SERPL-MCNC: 78 MG/DL
HGB BLD-MCNC: 8.8 G/DL (ref 12–16)
HGB BLD-MCNC: 9.5 G/DL (ref 12–16)
LDLC SERPL CALC-MCNC: 36 MG/DL
TRIGL SERPL-MCNC: 86 MG/DL (ref 0–149)

## 2020-06-13 PROCEDURE — 160009 HCHG ANES TIME/MIN: Performed by: INTERNAL MEDICINE

## 2020-06-13 PROCEDURE — 160209 HCHG ENDO MINUTES - EA ADDL 1 MIN LEVEL 5: Performed by: INTERNAL MEDICINE

## 2020-06-13 PROCEDURE — 700111 HCHG RX REV CODE 636 W/ 250 OVERRIDE (IP): Performed by: HOSPITALIST

## 2020-06-13 PROCEDURE — 501629 HCHG TUBE, LUKI TRAP STERILE DISP: Performed by: INTERNAL MEDICINE

## 2020-06-13 PROCEDURE — C9113 INJ PANTOPRAZOLE SODIUM, VIA: HCPCS | Performed by: HOSPITALIST

## 2020-06-13 PROCEDURE — 96376 TX/PRO/DX INJ SAME DRUG ADON: CPT | Mod: XU

## 2020-06-13 PROCEDURE — 160204 HCHG ENDO MINUTES - 1ST 30 MINS LEVEL 5: Performed by: INTERNAL MEDICINE

## 2020-06-13 PROCEDURE — 160035 HCHG PACU - 1ST 60 MINS PHASE I: Performed by: INTERNAL MEDICINE

## 2020-06-13 PROCEDURE — 36415 COLL VENOUS BLD VENIPUNCTURE: CPT

## 2020-06-13 PROCEDURE — G0378 HOSPITAL OBSERVATION PER HR: HCPCS

## 2020-06-13 PROCEDURE — 85018 HEMOGLOBIN: CPT | Mod: 91

## 2020-06-13 PROCEDURE — 80061 LIPID PANEL: CPT

## 2020-06-13 PROCEDURE — 99217 PR OBSERVATION CARE DISCHARGE: CPT | Performed by: FAMILY MEDICINE

## 2020-06-13 PROCEDURE — 160002 HCHG RECOVERY MINUTES (STAT): Performed by: INTERNAL MEDICINE

## 2020-06-13 PROCEDURE — 160048 HCHG OR STATISTICAL LEVEL 1-5: Performed by: INTERNAL MEDICINE

## 2020-06-13 PROCEDURE — 700111 HCHG RX REV CODE 636 W/ 250 OVERRIDE (IP): Performed by: ANESTHESIOLOGY

## 2020-06-13 RX ORDER — HALOPERIDOL 5 MG/ML
1 INJECTION INTRAMUSCULAR
Status: DISCONTINUED | OUTPATIENT
Start: 2020-06-13 | End: 2020-06-13 | Stop reason: HOSPADM

## 2020-06-13 RX ORDER — OMEPRAZOLE 20 MG/1
20 CAPSULE, DELAYED RELEASE ORAL DAILY
Qty: 30 CAP | Refills: 0 | Status: SHIPPED | OUTPATIENT
Start: 2020-06-13 | End: 2020-08-17 | Stop reason: SDUPTHER

## 2020-06-13 RX ORDER — ONDANSETRON 2 MG/ML
4 INJECTION INTRAMUSCULAR; INTRAVENOUS
Status: DISCONTINUED | OUTPATIENT
Start: 2020-06-13 | End: 2020-06-13 | Stop reason: HOSPADM

## 2020-06-13 RX ORDER — DIPHENHYDRAMINE HYDROCHLORIDE 50 MG/ML
12.5 INJECTION INTRAMUSCULAR; INTRAVENOUS
Status: DISCONTINUED | OUTPATIENT
Start: 2020-06-13 | End: 2020-06-13 | Stop reason: HOSPADM

## 2020-06-13 RX ADMIN — PANTOPRAZOLE SODIUM 40 MG: 40 INJECTION, POWDER, LYOPHILIZED, FOR SOLUTION INTRAVENOUS at 05:16

## 2020-06-13 RX ADMIN — PROPOFOL 100 MG: 10 INJECTION, EMULSION INTRAVENOUS at 11:00

## 2020-06-13 ASSESSMENT — FIBROSIS 4 INDEX: FIB4 SCORE: 1.62

## 2020-06-13 NOTE — PROGRESS NOTES
"Received alert and oriented x 4. Daughter at the bed side. On going bowel prep till clear. Npo at midnight.  Check vitals sign and recorded accordingly and due med given per MAR. Monitor sign and symptoms of respiratory distress and treatment given accordingly per MAR.Medicated per MAR and reassessed every 2 hours per protocol. Call light within reach. Bed alarm in placed. Needs attended. Will continue to monitor./72   Pulse 72   Temp 37 °C (98.6 °F) (Temporal)   Resp 18   Ht 1.651 m (5' 5\")   Wt 51.3 kg (113 lb)   SpO2 97%   BMI 18.80 kg/m² .  "

## 2020-06-13 NOTE — PROCEDURES
DATE OF SERVICE:  06/13/2020    PROCEDURE:  Colonoscopy.    PREOPERATIVE DIAGNOSIS:  Gastrointestinal bleed.    POSTOPERATIVE DIAGNOSES:  Diverticulosis, tortuous colon and colon polyp.    ANESTHESIA:  Per anesthesiologist.    DESCRIPTION OF PROCEDURE:  After informed consent and appropriate sedation,   the patient was placed in the left lateral position and the adult colonoscope   was advanced through the rectum all the way through to the cecum confirmed by   presence of the ileocecal valve and appendiceal orifice.  The prep was   adequate.  The scope was gently withdrawn.  The cecum, ascending and   transverse colons were unremarkable.  The descending and sigmoid colon were   quite tortuous and severely limited by diverticulosis.  The patient has severe   diverticulosis with both small and wide mouth diverticula, the majority of it   being wide mouth; they cover the vast majority of the descending and sigmoid   colon.  Scope was then withdrawn.  Sigmoid colon, there was a 3 to 4 mm   sessile polyp that was removed with cold snare; however, it was not retrieved.    The scope was withdrawn back into the rectum, retroflexion was performed and   the bowel was decompressed.  The rectum was unremarkable.  There were no   immediate postop complications.    RECOMMENDATIONS:  1.  Likely diverticular bleed.  2.  If patient rebleeds, recommend IR evaluation and possibly embolization.    No active bleed was seen at time of colonoscopy.  3.  If patient is hemodynamically stable from a GI standpoint, okay to   discharge home.  4.  Recommend followup in 4 to 6 weeks as an outpatient with our group.  5.  Avoid any blood thinners or NSAIDs.  6.  We will sign off for now.  Please call with any questions or concerns.       ____________________________________     DO LENY Resendez / EBONIE    DD:  06/13/2020 11:26:23  DT:  06/13/2020 11:36:31    D#:  [id number]  Job#:  [id number]

## 2020-06-13 NOTE — DISCHARGE SUMMARY
Discharge Summary    CHIEF COMPLAINT ON ADMISSION  Chief Complaint   Patient presents with   • Rectal Bleeding     x3 episodes   • Abdominal Pain     lower abd       Reason for Admission  GI bleed     Admission Date  6/11/2020    CODE STATUS  Full Code    HPI & HOSPITAL COURSE  This is 80 years old female who has past medical history of hypertension and history of GI bleed due to diverticular disease comes in with lower GI bleed.  Apparently patient was in her usual state of health until the day prior to admission when she felt abdominal cramps and had large loose bowel movement that looked maroon in color.  She continued to have those episodes few times at home then she decided to present to the emergency room for further evaluation.  While in the ER she had also more episodes.  Upon arrival she was hemodynamically stable.  Her hemoglobin initially was 9.9 g/dL but this is dropped from 12.4 g/dL on 6/11/2020.  Patient continued to have bloody stools and H&H continue to slowly trend down.  Was started on IV PPI in the ER.  Apparently patient has recently had upper and lower endoscopy done in April of this year which showed diverticular disease and unremarkable EGD.  Patient however was hospitalized in 2013 with significant diverticular bleeding.  She stated that she struggled with lower GI bleed due to diverticular bleed since she was in her 30s.  She denied any chest pain or shortness of breath.  Denied any dizziness or lightheadedness.  GI consulted.  Patient had colonoscopy which showed diverticulosis with no active bleed seen.  H&H continued to be stable.  GI recommended outpatient follow-up and avoid NSAIDs.  Patient was tolerating p.o. intake fairly.  Was hemodynamically clinically stable.  No issues overnight per staff.       Therefore, she is discharged in guarded and stable condition to home with close outpatient follow-up.    The patient recovered much more quickly than anticipated on  admission.    Discharge Date  6/13/20    FOLLOW UP ITEMS POST DISCHARGE  Follow-up with PCP in 1 week  Follow-up with GI as per recommendations    DISCHARGE DIAGNOSES  Principal Problem (Resolved):    GI bleed POA: Unknown  Active Problems:    Hypertension POA: Yes    Gastroesophageal reflux disease without esophagitis POA: Yes      FOLLOW UP  No future appointments.  Sam Soliman D.O.  [address] [place] [address] [phone]    Schedule an appointment as soon as possible for a visit in 4 weeks  Follow up within 4 - 6 weeks      MEDICATIONS ON DISCHARGE     Medication List      START taking these medications      Instructions   omeprazole 20 MG delayed-release capsule  Commonly known as:  PRILOSEC   Take 1 Cap by mouth every day.  Dose:  20 mg        CONTINUE taking these medications      Instructions   amLODIPine 5 MG Tabs  Commonly known as:  NORVASC   Doctor's comments:  Insurance AgentPair 100 day supply required  TAKE ONE TABLET BY MOUTH ONCE DAILY     estradiol 0.1 MG/GM vaginal cream  Commonly known as:  ESTRACE   insert 1 gram vaginally three times per week as directed            Allergies  Allergies   Allergen Reactions   • Ibuprofen      Stomach upset   • Morphine Vomiting   • Other Misc      Metals: zinc oxide, vanadium chloride, manganese chloride       DIET  Orders Placed This Encounter   Procedures   • Diet Order Low Fiber(GI Soft) (no dairy)     Standing Status:   Standing     Number of Occurrences:   1     Order Specific Question:   Diet:     Answer:   Low Fiber(GI Soft) [2]     Comments:   no dairy       ACTIVITY  As tolerated.  Weight bearing as tolerated    CONSULTATIONS  GI    PROCEDURES  Colonoscopy as above    LABORATORY  Lab Results   Component Value Date    SODIUM 137 06/12/2020    POTASSIUM 3.9 06/12/2020    CHLORIDE 105 06/12/2020    CO2 25 06/12/2020    GLUCOSE 100 (H) 06/12/2020    BUN 21 06/12/2020    CREATININE 0.61 06/12/2020        Lab Results   Component Value Date     WBC 4.0 (L) 06/12/2020    HEMOGLOBIN 8.8 (L) 06/13/2020    HEMATOCRIT 28.4 (L) 06/12/2020    PLATELETCT 190 06/12/2020        Total time of the discharge process exceeds 38 minutes.

## 2020-06-13 NOTE — ANESTHESIA TIME REPORT
Anesthesia Start and Stop Event Times     Date Time Event    6/13/2020 1047 Ready for Procedure     1052 Anesthesia Start     1138 Anesthesia Stop        Responsible Staff  06/13/20    Name Role Begin End    Mark Burrell M.D. Anesth 1052 1138        Preop Diagnosis (Free Text):  Pre-op Diagnosis     gi bleed        Preop Diagnosis (Codes):    Post op Diagnosis  GI bleed      Premium Reason  E. Weekend    Comments: emergency, premium

## 2020-06-13 NOTE — ANESTHESIA QCDR
2019 [facility] Clinical Data Registry (for Quality Improvement)     Postoperative nausea/vomiting risk protocol (Adult = 18 yrs and Pediatric 3-17 yrs)- (430 and 463)  General inhalation anesthetic (NOT TIVA) with PONV risk factors: Yes  Provision of anti-emetic therapy with at least 2 different classes of agents: Yes   Patient DID NOT receive anti-emetic therapy and reason is documented in Medical Record:  N/A    Multimodal Pain Management- (477)  Non-emergent surgery AND patient age >= 18: No  Use of Multimodal Pain Management, two or more drugs and/or interventions, NOT including systemic opioids:   Exception: Documented allergy to multiple classes of analgesics:     Smoking Abstinence (404)  Patient is current smoker (cigarette, pipe, e-cig, marijuanna): No  Elective Surgery:   Abstinence instructions provided prior to day of surgery:   Patient abstained from smoking on day of surgery:     Pre-Op Beta-Blocker in Isolated CABG (44)  Isolated CABG AND patient age >= 18: No  Beta-blocker admin within 24 hours of surgical incision:   Exception:of medical reason(s) for not administering beta blocker within 24 hours prior to surgical incision (e.g., not  indicated,other medical reason):     PACU assessment of acute postoperative pain prior to Anesthesia Care End- Applies to Patients Age = 18- (ABG7)  Initial PACU pain score is which of the following: < 7/10  Patient unable to report pain score: N/A    Post-anesthetic transfer of care checklist/protocol to PACU/ICU- (426 and 427)  Upon conclusion of case, patient transferred to which of the following locations: PACU/Non-ICU  Use of transfer checklist/protocol: Yes  Exclusion: Service Performed in Patient Hospital Room (and thus did not require transfer): N/A  Unplanned admission to ICU related to anesthesia service up through end of PACU care- (MD51)  Unplanned admission to ICU (not initially anticipated at anesthesia start time): No

## 2020-06-13 NOTE — DISCHARGE INSTRUCTIONS
Discharge Instructions    Discharged to home by car with relative. Discharged via wheelchair, hospital escort: Yes.  Special equipment needed: Not Applicable    Be sure to schedule a follow-up appointment with your primary care doctor or Dr. Soliman as instructed. Avoid taking NSAIDs and blood thinners.    Discharge Plan:        I understand that a diet low in cholesterol, fat, and sodium is recommended for good health. Unless I have been given specific instructions below for another diet, I accept this instruction as my diet prescription.   Other diet: Regular Diet when comfortable    Special Instructions: None    · Is patient discharged on Warfarin / Coumadin?   No         Gastrointestinal Bleeding  Introduction  Gastrointestinal bleeding is bleeding somewhere along the path food travels through the body (digestive tract). This path is anywhere between the mouth and the opening of the butt (anus). You may have blood in your poop (stools) or have black poop. If you throw up (vomit), there may be blood in it.  This condition can be mild, serious, or even life-threatening. If you have a lot of bleeding, you may need to stay in the hospital.  Follow these instructions at home:  · Take over-the-counter and prescription medicines only as told by your doctor.  · Eat foods that have a lot of fiber in them. These foods include whole grains, fruits, and vegetables. You can also try eating 1-3 prunes each day.  · Drink enough fluid to keep your pee (urine) clear or pale yellow.  · Keep all follow-up visits as told by your doctor. This is important.  Contact a doctor if:  · Your symptoms do not get better.  Get help right away if:  · Your bleeding gets worse.  · You feel dizzy or you pass out (faint).  · You feel weak.  · You have very bad cramps in your back or belly (abdomen).  · You pass large clumps of blood (clots) in your poop.  · Your symptoms are getting worse.  This information is not intended to replace advice given  to you by your health care provider. Make sure you discuss any questions you have with your health care provider.  Document Released: 09/26/2009 Document Revised: 05/25/2017 Document Reviewed: 06/06/2016  © 2017 [employer]      Depression / Suicide Risk    As you are discharged from this [facility] facility, it is important to learn how to keep safe from harming yourself.    Recognize the warning signs:  · Abrupt changes in personality, positive or negative- including increase in energy   · Giving away possessions  · Change in eating patterns- significant weight changes-  positive or negative  · Change in sleeping patterns- unable to sleep or sleeping all the time   · Unwillingness or inability to communicate  · Depression  · Unusual sadness, discouragement and loneliness  · Talk of wanting to die  · Neglect of personal appearance   · Rebelliousness- reckless behavior  · Withdrawal from people/activities they love  · Confusion- inability to concentrate     If you or a loved one observes any of these behaviors or has concerns about self-harm, here's what you can do:  · Talk about it- your feelings and reasons for harming yourself  · Remove any means that you might use to hurt yourself (examples: pills, rope, extension cords, firearm)  · Get professional help from the community (Mental Health, Substance Abuse, psychological counseling)  · Do not be alone:Call your Safe Contact- someone whom you trust who will be there for you.  · Call your local CRISIS HOTLINE 566-4516 or 127-599-9524  · Call your local Children's Mobile Crisis Response Team [place] (401) 406-2571 or www.[employer]  · Call the toll free National Suicide Prevention Hotlines   · National Suicide Prevention Lifeline 277-703-QCPQ (0948)  · National Hope Line Network 800-SUICIDE (643-8442)

## 2020-06-13 NOTE — ANESTHESIA PREPROCEDURE EVALUATION
Relevant Problems   CARDIAC   (+) Aortic atherosclerosis (HCC)   (+) Chronic venous insufficiency   (+) Ectatic abdominal aorta (HCC)   (+) Hypertension      GI   (+) Gastroesophageal reflux disease without esophagitis      Other   (+) Arthritis       Physical Exam    Airway   Mallampati: II  TM distance: >3 FB  Neck ROM: full       Cardiovascular - normal exam  Rhythm: regular  Rate: normal  (-) murmur     Dental - normal exam           Pulmonary - normal exam  Breath sounds clear to auscultation     Abdominal    Neurological - normal exam                 Anesthesia Plan    ASA 2- EMERGENT       Plan - general       Airway plan will be LMA        Induction: intravenous    Postoperative Plan: Postoperative administration of opioids is intended.    Pertinent diagnostic labs and testing reviewed    Informed Consent:    Anesthetic plan and risks discussed with patient.    Use of blood products discussed with: patient whom consented to blood products.

## 2020-06-13 NOTE — PROGRESS NOTES
Bedside report received from Dilcia PRAJAPATI . Assumed care of pt at 0645. Pt is awake at this time with no signs of distress. Plan of care discussed with pt. Pt is A&O x 4. Pt is on RA. Bed alarm is on, bed in lowest position, bed rails up x 2, belongings and call light within reach. Hourly rounding in place.

## 2020-06-13 NOTE — CONSULTS
DATE OF SERVICE:  06/12/2020    REASON FOR CONSULTATION:  GI bleed.    HISTORY OF PRESENT ILLNESS:  Patient is a pleasant 80-year-old female who   comes in to the hospital with 1-day history of maroon-colored stools and   possibly some clots.  Patient does report some mild diffuse abdominal   discomfort, but no significant pain.  Patient reports it started as painless   bleeding.  Patient had a colonoscopy approximately 2 months ago where she   reports 2 polyps were removed.  Patient denies any blood thinners or NSAIDs   use.  Patient denies any current nausea, vomiting, diarrhea, fever, chills,   chest pain or shortness of breath.    PAST MEDICAL HISTORY:  Colon polyps, breast cancer.    PAST SURGICAL HISTORY:  Hysterectomy and knee surgeries.    FAMILY HISTORY:  Colon cancer.    SOCIAL HISTORY:  Patient denies alcohol, tobacco, or illicit drug use.    MEDICATIONS:  Patient denies any blood thinners or NSAID use.  See med rec   list for the patient's medications.    REVIEW OF SYSTEMS:  A 14-point review of systems was obtained and found to be   negative, except for those above in the HPI.    PHYSICAL EXAMINATION:  GENERAL:  No acute distress, alert, awake, oriented x3.  VITAL SIGNS:  Stable.  HEENT:  PERRLA.  Extraocular movements intact.  Sclerae are anicteric.  Mild   conjunctival pallor.  HEART:  Regular rate and rhythm.  LUNGS:  Clear to auscultation bilaterally.  ABDOMEN:  Soft and nontender.  No guarding or rebound.  EXTREMITIES:  No edema noted, bilateral lower extremities.  NEUROLOGIC:  Grossly intact.  PSYCHIATRIC:  Affect is normal.  SKIN:  No jaundice, but mild pallor.    LABORATORY DATA:  Hemoglobin 9.1, hematocrit 28.4.  INR 1.11.  BUN 21,   creatinine 0.61, AST 13, ALT 12, alkaline phosphatase 50, total bilirubin 0.6,   albumin 3.2.    ASSESSMENT AND PLAN:  1.  Gastrointestinal bleed, suspect lower gastrointestinal source.    Hemodynamically stable at this time, not requiring any transfusions.   Patient   recently had a colonoscopy 2 months ago and is hesitant to undergo colonoscopy   again unless necessary.  Likely diverticular bleeding statistically and   previous CT in 2018 showed colonic diverticulosis.  This is quite unlikely to   be a post polypectomy bleed as usually that occurs within a day to 2 weeks   after, so I suspect that is not the case.  Recommend conservative management   for now with clear liquid diet, watching closely.  If the patient persists in   bleeding and requires any transfusions or decides that she would like to   undergo colonoscopy, we will plan to prep the patient this evening for   colonoscopy tomorrow.  As of now, we will hold off due to the patient's   wishes, she would like us to watch closely.  Again, we will keep patient on   clear liquids just in case she does require colonoscopy to evaluate further.    Transfuse for any hemoglobin less than 7. patient's hemoglobin is relatively   stable at this time.  2.  Anemia, see above, secondary to acute gastrointestinal bleeding.    Transfuse for any hemoglobin less than 7.  No need at this time.  Hold any   blood thinners.  3.  Colonic diverticulosis, likely diverticular bleed source; however, see   discussion as above.  Plan for colonoscopy only if patient decides she would   like to pursue this.  As of now, we will hold off due to the patient's wishes.    I suspect this will stop on its own if it is diverticular in nature.  If the   patient persists in bleeding and continues to decline colonoscopy, we could   pursue interventional radiology for possible embolization if positive bleeding   scan.  4.  We will follow along for now.  Please call with any questions or concerns.       ____________________________________     DO LENY Resendez    DD:  06/12/2020 11:27:33  DT:  06/12/2020 17:23:35    D#:  [id number]  Job#:  [id number]

## 2020-06-13 NOTE — CARE PLAN
Problem: Safety  Goal: Will remain free from falls  Note: Encouraged to call staff especially getting out of bed for frequent bed side commode use. Pt cooperative     Problem: Knowledge Deficit  Goal: Knowledge of disease process/condition, treatment plan, diagnostic tests, and medications will improve  Note: Treatment plan and procedure preparation explained to the patient and daughter, both verbalized understanding.

## 2020-06-13 NOTE — ANESTHESIA PROCEDURE NOTES
Airway    Date/Time: 6/13/2020 11:00 AM  Performed by: Mark Burrell M.D.  Authorized by: Mark Burrell M.D.     Location:  OR  Urgency:  Elective  Indications for Airway Management:  Anesthesia      Spontaneous Ventilation: absent    Sedation Level:  Deep  Preoxygenated: Yes    Final Airway Type:  Supraglottic airway  Final Supraglottic Airway:  Standard LMA    SGA Size:  3  Number of Attempts at Approach:  1

## 2020-06-13 NOTE — ANESTHESIA POSTPROCEDURE EVALUATION
Patient: Beth Fuller    Procedure Summary     Date:  06/13/20 Room / Location:  [facility] OR 07 / SURGERY [facility]    Anesthesia Start:  1052 Anesthesia Stop:  1138    Procedure:  COLONOSCOPY, WITH POLYPECTOMY (N/A Anus) Diagnosis:  (colon polyp)    Surgeon:  Sam Soliman D.O. Responsible Provider:  Mark Burrell M.D.    Anesthesia Type:  general ASA Status:  2 - Emergent          Final Anesthesia Type: general  Last vitals  BP   Blood Pressure : 137/76    Temp   36.7 °C (98.1 °F)    Pulse   Pulse: 66   Resp   16    SpO2   100 %      Anesthesia Post Evaluation    Patient location during evaluation: PACU  Patient participation: complete - patient participated  Level of consciousness: awake and alert    Airway patency: patent  Anesthetic complications: no  Cardiovascular status: hemodynamically stable  Respiratory status: acceptable  Hydration status: euvolemic    PONV: none           Nurse Pain Score: 0 (NPRS)

## 2020-06-14 NOTE — PROGRESS NOTES
Discharge packet printed and dicussed thoroughly with pt and daughter Gretel. Pt is discharging with daughter via car. Pt has belongings including packet. The need to set up a follow up appointment discussed with patient and daughter. IV discontinued and pt taken out.

## 2020-06-25 ENCOUNTER — HOSPITAL ENCOUNTER (OUTPATIENT)
Dept: LAB | Facility: MEDICAL CENTER | Age: 80
End: 2020-06-25
Attending: NURSE PRACTITIONER
Payer: MEDICARE

## 2020-06-25 PROCEDURE — 81001 URINALYSIS AUTO W/SCOPE: CPT

## 2020-06-25 PROCEDURE — 87077 CULTURE AEROBIC IDENTIFY: CPT

## 2020-06-25 PROCEDURE — 87186 SC STD MICRODIL/AGAR DIL: CPT

## 2020-06-25 PROCEDURE — 87086 URINE CULTURE/COLONY COUNT: CPT

## 2020-06-26 LAB
AMORPH CRY #/AREA URNS HPF: PRESENT /HPF
APPEARANCE UR: ABNORMAL
BACTERIA #/AREA URNS HPF: ABNORMAL /HPF
BILIRUB UR QL STRIP.AUTO: NEGATIVE
COLOR UR: YELLOW
GLUCOSE UR STRIP.AUTO-MCNC: NEGATIVE MG/DL
KETONES UR STRIP.AUTO-MCNC: NEGATIVE MG/DL
LEUKOCYTE ESTERASE UR QL STRIP.AUTO: ABNORMAL
MICRO URNS: ABNORMAL
NITRITE UR QL STRIP.AUTO: NEGATIVE
PH UR STRIP.AUTO: 6.5 [PH] (ref 5–8)
PROT UR QL STRIP: NEGATIVE MG/DL
RBC # URNS HPF: ABNORMAL /HPF
RBC UR QL AUTO: ABNORMAL
SP GR UR STRIP.AUTO: 1.01
UROBILINOGEN UR STRIP.AUTO-MCNC: 0.2 MG/DL
WBC #/AREA URNS HPF: ABNORMAL /HPF

## 2020-06-28 LAB
BACTERIA UR CULT: ABNORMAL
BACTERIA UR CULT: ABNORMAL
SIGNIFICANT IND 70042: ABNORMAL
SITE SITE: ABNORMAL
SOURCE SOURCE: ABNORMAL

## 2020-06-30 ENCOUNTER — HOSPITAL ENCOUNTER (OUTPATIENT)
Dept: LAB | Facility: MEDICAL CENTER | Age: 80
End: 2020-06-30
Payer: MEDICARE

## 2020-06-30 LAB
ALBUMIN SERPL BCP-MCNC: 4.1 G/DL (ref 3.2–4.9)
ALBUMIN/GLOB SERPL: 1.8 G/DL
ALP SERPL-CCNC: 71 U/L (ref 30–99)
ALT SERPL-CCNC: 13 U/L (ref 2–50)
ANION GAP SERPL CALC-SCNC: 10 MMOL/L (ref 7–16)
AST SERPL-CCNC: 16 U/L (ref 12–45)
BASOPHILS # BLD AUTO: 1.4 % (ref 0–1.8)
BASOPHILS # BLD: 0.06 K/UL (ref 0–0.12)
BILIRUB SERPL-MCNC: 0.3 MG/DL (ref 0.1–1.5)
BUN SERPL-MCNC: 19 MG/DL (ref 8–22)
CALCIUM SERPL-MCNC: 9.2 MG/DL (ref 8.5–10.5)
CHLORIDE SERPL-SCNC: 101 MMOL/L (ref 96–112)
CO2 SERPL-SCNC: 27 MMOL/L (ref 20–33)
CREAT SERPL-MCNC: 0.68 MG/DL (ref 0.5–1.4)
EOSINOPHIL # BLD AUTO: 0.11 K/UL (ref 0–0.51)
EOSINOPHIL NFR BLD: 2.6 % (ref 0–6.9)
ERYTHROCYTE [DISTWIDTH] IN BLOOD BY AUTOMATED COUNT: 48 FL (ref 35.9–50)
FERRITIN SERPL-MCNC: 56.2 NG/ML (ref 10–291)
FOLATE SERPL-MCNC: 26.5 NG/ML
GLOBULIN SER CALC-MCNC: 2.3 G/DL (ref 1.9–3.5)
GLUCOSE SERPL-MCNC: 94 MG/DL (ref 65–99)
HCT VFR BLD AUTO: 34.1 % (ref 37–47)
HGB BLD-MCNC: 11.2 G/DL (ref 12–16)
IMM GRANULOCYTES # BLD AUTO: 0.02 K/UL (ref 0–0.11)
IMM GRANULOCYTES NFR BLD AUTO: 0.5 % (ref 0–0.9)
LYMPHOCYTES # BLD AUTO: 0.64 K/UL (ref 1–4.8)
LYMPHOCYTES NFR BLD: 15.1 % (ref 22–41)
MCH RBC QN AUTO: 32.1 PG (ref 27–33)
MCHC RBC AUTO-ENTMCNC: 32.8 G/DL (ref 33.6–35)
MCV RBC AUTO: 97.7 FL (ref 81.4–97.8)
MONOCYTES # BLD AUTO: 0.44 K/UL (ref 0–0.85)
MONOCYTES NFR BLD AUTO: 10.4 % (ref 0–13.4)
NEUTROPHILS # BLD AUTO: 2.97 K/UL (ref 2–7.15)
NEUTROPHILS NFR BLD: 70 % (ref 44–72)
NRBC # BLD AUTO: 0 K/UL
NRBC BLD-RTO: 0 /100 WBC
PLATELET # BLD AUTO: 342 K/UL (ref 164–446)
PMV BLD AUTO: 9.9 FL (ref 9–12.9)
POTASSIUM SERPL-SCNC: 4.3 MMOL/L (ref 3.6–5.5)
PROT SERPL-MCNC: 6.4 G/DL (ref 6–8.2)
RBC # BLD AUTO: 3.49 M/UL (ref 4.2–5.4)
SODIUM SERPL-SCNC: 138 MMOL/L (ref 135–145)
VIT B12 SERPL-MCNC: 785 PG/ML (ref 211–911)
WBC # BLD AUTO: 4.2 K/UL (ref 4.8–10.8)

## 2020-06-30 PROCEDURE — 82746 ASSAY OF FOLIC ACID SERUM: CPT

## 2020-06-30 PROCEDURE — 82607 VITAMIN B-12: CPT

## 2020-06-30 PROCEDURE — 85025 COMPLETE CBC W/AUTO DIFF WBC: CPT

## 2020-06-30 PROCEDURE — 82728 ASSAY OF FERRITIN: CPT

## 2020-06-30 PROCEDURE — 80053 COMPREHEN METABOLIC PANEL: CPT

## 2020-07-02 ENCOUNTER — TELEPHONE (OUTPATIENT)
Dept: MEDICAL GROUP | Facility: PHYSICIAN GROUP | Age: 80
End: 2020-07-02

## 2020-07-02 NOTE — TELEPHONE ENCOUNTER
1. Caller Name: Beth Fuller                          Call Back Number: [phone]        How would the patient prefer to be contacted with a response: Phone call OK to leave a detailed message    Patient is wanting lab results. Please advise.

## 2020-07-02 NOTE — TELEPHONE ENCOUNTER
Please notify patient that labs appear relatively normal with the exception of some subtle anemia.  Iron studies are normal.  Patient should discuss these results in greater detail with the ordering physician.

## 2020-07-13 ENCOUNTER — PATIENT OUTREACH (OUTPATIENT)
Dept: HEALTH INFORMATION MANAGEMENT | Facility: OTHER | Age: 80
End: 2020-07-13

## 2020-07-16 ENCOUNTER — HOSPITAL ENCOUNTER (OUTPATIENT)
Dept: LAB | Facility: MEDICAL CENTER | Age: 80
End: 2020-07-16
Attending: OBSTETRICS & GYNECOLOGY
Payer: MEDICARE

## 2020-07-16 PROCEDURE — 81001 URINALYSIS AUTO W/SCOPE: CPT

## 2020-07-16 PROCEDURE — 87086 URINE CULTURE/COLONY COUNT: CPT

## 2020-07-17 LAB
APPEARANCE UR: CLEAR
BACTERIA #/AREA URNS HPF: NEGATIVE /HPF
BILIRUB UR QL STRIP.AUTO: NEGATIVE
COLOR UR: YELLOW
EPI CELLS #/AREA URNS HPF: ABNORMAL /HPF
GLUCOSE UR STRIP.AUTO-MCNC: NEGATIVE MG/DL
KETONES UR STRIP.AUTO-MCNC: NEGATIVE MG/DL
LEUKOCYTE ESTERASE UR QL STRIP.AUTO: ABNORMAL
MICRO URNS: ABNORMAL
NITRITE UR QL STRIP.AUTO: NEGATIVE
PH UR STRIP.AUTO: 7.5 [PH] (ref 5–8)
PROT UR QL STRIP: NEGATIVE MG/DL
RBC # URNS HPF: ABNORMAL /HPF
RBC UR QL AUTO: NEGATIVE
SP GR UR STRIP.AUTO: 1.01
UROBILINOGEN UR STRIP.AUTO-MCNC: 0.2 MG/DL
WBC #/AREA URNS HPF: ABNORMAL /HPF

## 2020-07-19 LAB
BACTERIA UR CULT: NORMAL
SIGNIFICANT IND 70042: NORMAL
SITE SITE: NORMAL
SOURCE SOURCE: NORMAL

## 2020-07-20 ENCOUNTER — TELEPHONE (OUTPATIENT)
Dept: URGENT CARE | Facility: PHYSICIAN GROUP | Age: 80
End: 2020-07-20

## 2020-07-20 NOTE — PROGRESS NOTES
An 80-year-old female was an emergent admission to [facility] from 6/11/2020 to 6/13/2020 to treat Gastrointestinal hemorrhage, unspecified. The patient was discharged home. No additional medical conditions were listed. The patient was not under clinical case management.     The patient was ordered to start/continue to take the following medication: Omeprazole. The patient successfully filled all medications.     The patient was ordered to follow-up with PCP and Specialist. The patient had the following appointments:     1) 6/23/2020 @ 2:58 Kristal Arrington), physician assistant      2) 7/7/2020 @ 2:58 Kristal Arrington), physician assistant   The patient has future appointments scheduled for:     1) 7/24/2020 @ 11:40 Jm Manzo, general/family practice      2) 7/27/2020 @ 12:20 Dino Pavon, gastroenterology     The patient was engaged and receptive to nH services.  The patient navigator was able to assist the patient in a sooner appointment with a referral for [facility] services.  The patient navigator assisted the patient in scheduling with PCP.  The patient navigator also was able to assist the patient with a transportation need by scheduling an [employer] ride for a future appointment.  The patient recovered well and was very thankful for nH services.

## 2020-07-20 NOTE — TELEPHONE ENCOUNTER
I rescheduled pt from 7/24 to 8/21. She would like to be put on a cancellation list if she can get in sooner. Please call.

## 2020-07-23 ENCOUNTER — HOSPITAL ENCOUNTER (OUTPATIENT)
Dept: RADIOLOGY | Facility: MEDICAL CENTER | Age: 80
End: 2020-07-23
Attending: FAMILY MEDICINE
Payer: MEDICARE

## 2020-07-23 ENCOUNTER — OFFICE VISIT (OUTPATIENT)
Dept: MEDICAL GROUP | Facility: PHYSICIAN GROUP | Age: 80
End: 2020-07-23
Payer: MEDICARE

## 2020-07-23 VITALS
HEIGHT: 65 IN | HEART RATE: 75 BPM | SYSTOLIC BLOOD PRESSURE: 130 MMHG | DIASTOLIC BLOOD PRESSURE: 80 MMHG | BODY MASS INDEX: 19.49 KG/M2 | OXYGEN SATURATION: 96 % | RESPIRATION RATE: 14 BRPM | TEMPERATURE: 99.1 F | WEIGHT: 117 LBS

## 2020-07-23 DIAGNOSIS — M54.42 CHRONIC BILATERAL LOW BACK PAIN WITH LEFT-SIDED SCIATICA: ICD-10-CM

## 2020-07-23 DIAGNOSIS — G89.29 CHRONIC BILATERAL LOW BACK PAIN WITH LEFT-SIDED SCIATICA: ICD-10-CM

## 2020-07-23 DIAGNOSIS — I70.0 ATHEROSCLEROSIS OF AORTA (HCC): ICD-10-CM

## 2020-07-23 DIAGNOSIS — I10 ESSENTIAL HYPERTENSION: ICD-10-CM

## 2020-07-23 DIAGNOSIS — M54.2 CERVICALGIA: ICD-10-CM

## 2020-07-23 DIAGNOSIS — M15.9 PRIMARY OSTEOARTHRITIS INVOLVING MULTIPLE JOINTS: ICD-10-CM

## 2020-07-23 DIAGNOSIS — M19.90 ARTHRITIS: ICD-10-CM

## 2020-07-23 PROCEDURE — 72072 X-RAY EXAM THORAC SPINE 3VWS: CPT

## 2020-07-23 PROCEDURE — 8041 PR SCP AHA: Performed by: FAMILY MEDICINE

## 2020-07-23 PROCEDURE — 72040 X-RAY EXAM NECK SPINE 2-3 VW: CPT

## 2020-07-23 PROCEDURE — 72100 X-RAY EXAM L-S SPINE 2/3 VWS: CPT

## 2020-07-23 PROCEDURE — 99214 OFFICE O/P EST MOD 30 MIN: CPT | Performed by: FAMILY MEDICINE

## 2020-07-23 ASSESSMENT — FIBROSIS 4 INDEX: FIB4 SCORE: 1.06

## 2020-07-23 ASSESSMENT — PATIENT HEALTH QUESTIONNAIRE - PHQ9: CLINICAL INTERPRETATION OF PHQ2 SCORE: 0

## 2020-07-23 NOTE — LETTER
[employer] includes the [facility]  Jm Manzo M.D.  [address] [place] [address]  Fax: [phone]   Authorization for Release/Disclosure of   Protected Health Information   Name: BETH SOW : 1940 SSN: [id number]   Address: [address] [place] [address] Phone:    [phone] (home)    I authorize the entity listed below to release/disclose the PHI below to:   [facility]/Jm Manzo M.D. and Jm Manzo M.D.   Provider or Entity Name:  Kenyatta Rodriguez   Address   City, [facility], [facility]   Phone:      Fax:     Reason for request: continuity of care   Information to be released:    [  ] LAST COLONOSCOPY,  including any PATH REPORT and follow-up  [  ] LAST FIT/COLOGUARD RESULT [  ] LAST DEXA  [  ] LAST MAMMOGRAM  [  ] LAST PAP  [  ] LAST LABS [  ] RETINA EXAM REPORT  [  ] IMMUNIZATION RECORDS  [  ] Release all info      [  ] Check here and initial the line next to each item to release ALL health information INCLUDING  _____ Care and treatment for drug and / or alcohol abuse  _____ HIV testing, infection status, or AIDS  _____ Genetic Testing    DATES OF SERVICE OR TIME PERIOD TO BE DISCLOSED: _____________  I understand and acknowledge that:  * This Authorization may be revoked at any time by you in writing, except if your health information has already been used or disclosed.  * Your health information that will be used or disclosed as a result of you signing this authorization could be re-disclosed by the recipient. If this occurs, your re-disclosed health information may no longer be protected by State or Federal laws.  * You may refuse to sign this Authorization. Your refusal will not affect your ability to obtain treatment.  * This Authorization becomes effective upon signing and will  on (date) __________.      If no date is indicated, this Authorization will  one (1) year from the signature date.    Name: Beth Sow    Signature:   Date:     2020            PLEASE FAX REQUESTED RECORDS BACK TO: [phone]

## 2020-07-24 ENCOUNTER — TELEPHONE (OUTPATIENT)
Dept: MEDICAL GROUP | Facility: PHYSICIAN GROUP | Age: 80
End: 2020-07-24

## 2020-07-24 DIAGNOSIS — M54.42 CHRONIC BILATERAL LOW BACK PAIN WITH LEFT-SIDED SCIATICA: ICD-10-CM

## 2020-07-24 DIAGNOSIS — M54.2 CERVICALGIA: ICD-10-CM

## 2020-07-24 DIAGNOSIS — G89.29 CHRONIC BILATERAL LOW BACK PAIN WITH LEFT-SIDED SCIATICA: ICD-10-CM

## 2020-07-24 NOTE — ASSESSMENT & PLAN NOTE
Chronic ongoing medical condition.  Atherosclerotic plaque again noted on CT January 2020.  Patient not currently taking cholesterol-lowering medication at this time.

## 2020-07-24 NOTE — TELEPHONE ENCOUNTER
----- Message from Jm Manzo M.D. sent at 7/23/2020  5:59 PM PDT -----  Please call notify patient of minor abnormalities consistent with arthritis that may be contributing to her ongoing back and neck pain.  Home physical therapy exercises are currently being mailed to her home.  If she would like professional physical therapy we can arrange that at her request.

## 2020-07-28 NOTE — PROGRESS NOTES
Called member for [employer] and to follow up with her. She stated she has been thinking about going to physical therapy for exercise. I asked her if she wanted me to send her the providers directory for our physical therapy and she said please. I also let her know if her daughter has access to a computer she can look up on [facility] website the list of providers near [facility] . I let her know once she finds one we can place in the referral with her primary provider but check with the location firs if they need one. Physical therapy, speech therapy and occupational therapy over 20 visits per year requires prior-authorization (approval in advance) to be covered.

## 2020-07-31 NOTE — TELEPHONE ENCOUNTER
Good morning Dr. Manzo,    The patient called and requested a referral to physical therapy for her arthritis in neck and back. She would like to request the referral to [facility] in [place].     Thank you.

## 2020-08-17 DIAGNOSIS — K21.9 GASTROESOPHAGEAL REFLUX DISEASE WITHOUT ESOPHAGITIS: ICD-10-CM

## 2020-08-17 NOTE — TELEPHONE ENCOUNTER
Received request via: Pharmacy    Was the patient seen in the last year in this department? Yes LOV 07/23/2020    Does the patient have an active prescription (recently filled or refills available) for medication(s) requested? No

## 2020-08-18 RX ORDER — OMEPRAZOLE 20 MG/1
20 CAPSULE, DELAYED RELEASE ORAL DAILY
Qty: 90 CAP | Refills: 4 | Status: SHIPPED | OUTPATIENT
Start: 2020-08-18 | End: 2021-08-25

## 2020-09-24 ENCOUNTER — APPOINTMENT (OUTPATIENT)
Dept: RADIOLOGY | Facility: MEDICAL CENTER | Age: 80
End: 2020-09-24
Attending: EMERGENCY MEDICINE
Payer: MEDICARE

## 2020-09-24 ENCOUNTER — HOSPITAL ENCOUNTER (OUTPATIENT)
Facility: MEDICAL CENTER | Age: 80
End: 2020-09-27
Attending: EMERGENCY MEDICINE | Admitting: STUDENT IN AN ORGANIZED HEALTH CARE EDUCATION/TRAINING PROGRAM
Payer: MEDICARE

## 2020-09-24 DIAGNOSIS — K62.5 RECTAL BLEEDING: ICD-10-CM

## 2020-09-24 LAB
ABO GROUP BLD: NORMAL
ALBUMIN SERPL BCP-MCNC: 4.5 G/DL (ref 3.2–4.9)
ALBUMIN/GLOB SERPL: 1.8 G/DL
ALP SERPL-CCNC: 83 U/L (ref 30–99)
ALT SERPL-CCNC: 15 U/L (ref 2–50)
ANION GAP SERPL CALC-SCNC: 14 MMOL/L (ref 7–16)
APTT PPP: 28.9 SEC (ref 24.7–36)
AST SERPL-CCNC: 21 U/L (ref 12–45)
BASOPHILS # BLD AUTO: 0.7 % (ref 0–1.8)
BASOPHILS # BLD: 0.03 K/UL (ref 0–0.12)
BILIRUB SERPL-MCNC: 0.4 MG/DL (ref 0.1–1.5)
BLD GP AB SCN SERPL QL: NORMAL
BUN SERPL-MCNC: 19 MG/DL (ref 8–22)
CALCIUM SERPL-MCNC: 9.4 MG/DL (ref 8.5–10.5)
CHLORIDE SERPL-SCNC: 99 MMOL/L (ref 96–112)
CO2 SERPL-SCNC: 25 MMOL/L (ref 20–33)
CREAT SERPL-MCNC: 0.72 MG/DL (ref 0.5–1.4)
EOSINOPHIL # BLD AUTO: 0.03 K/UL (ref 0–0.51)
EOSINOPHIL NFR BLD: 0.7 % (ref 0–6.9)
ERYTHROCYTE [DISTWIDTH] IN BLOOD BY AUTOMATED COUNT: 42.9 FL (ref 35.9–50)
GLOBULIN SER CALC-MCNC: 2.5 G/DL (ref 1.9–3.5)
GLUCOSE SERPL-MCNC: 100 MG/DL (ref 65–99)
HCT VFR BLD AUTO: 42.1 % (ref 37–47)
HGB BLD-MCNC: 14.3 G/DL (ref 12–16)
IMM GRANULOCYTES # BLD AUTO: 0.01 K/UL (ref 0–0.11)
IMM GRANULOCYTES NFR BLD AUTO: 0.2 % (ref 0–0.9)
INR PPP: 0.93 (ref 0.87–1.13)
LIPASE SERPL-CCNC: 68 U/L (ref 11–82)
LYMPHOCYTES # BLD AUTO: 0.76 K/UL (ref 1–4.8)
LYMPHOCYTES NFR BLD: 17 % (ref 22–41)
MAGNESIUM SERPL-MCNC: 2 MG/DL (ref 1.5–2.5)
MCH RBC QN AUTO: 30.7 PG (ref 27–33)
MCHC RBC AUTO-ENTMCNC: 34 G/DL (ref 33.6–35)
MCV RBC AUTO: 90.3 FL (ref 81.4–97.8)
MONOCYTES # BLD AUTO: 0.49 K/UL (ref 0–0.85)
MONOCYTES NFR BLD AUTO: 10.9 % (ref 0–13.4)
NEUTROPHILS # BLD AUTO: 3.16 K/UL (ref 2–7.15)
NEUTROPHILS NFR BLD: 70.5 % (ref 44–72)
NRBC # BLD AUTO: 0 K/UL
NRBC BLD-RTO: 0 /100 WBC
PLATELET # BLD AUTO: 220 K/UL (ref 164–446)
PMV BLD AUTO: 11 FL (ref 9–12.9)
POTASSIUM SERPL-SCNC: 4.1 MMOL/L (ref 3.6–5.5)
PROT SERPL-MCNC: 7 G/DL (ref 6–8.2)
PROTHROMBIN TIME: 12.7 SEC (ref 12–14.6)
RBC # BLD AUTO: 4.66 M/UL (ref 4.2–5.4)
RH BLD: NORMAL
SODIUM SERPL-SCNC: 138 MMOL/L (ref 135–145)
WBC # BLD AUTO: 4.5 K/UL (ref 4.8–10.8)

## 2020-09-24 PROCEDURE — 99220 PR INITIAL OBSERVATION CARE,LEVL III: CPT | Performed by: STUDENT IN AN ORGANIZED HEALTH CARE EDUCATION/TRAINING PROGRAM

## 2020-09-24 PROCEDURE — G0378 HOSPITAL OBSERVATION PER HR: HCPCS

## 2020-09-24 PROCEDURE — 71045 X-RAY EXAM CHEST 1 VIEW: CPT

## 2020-09-24 PROCEDURE — 85025 COMPLETE CBC W/AUTO DIFF WBC: CPT

## 2020-09-24 PROCEDURE — 85610 PROTHROMBIN TIME: CPT

## 2020-09-24 PROCEDURE — 83690 ASSAY OF LIPASE: CPT

## 2020-09-24 PROCEDURE — 700101 HCHG RX REV CODE 250: Performed by: INTERNAL MEDICINE

## 2020-09-24 PROCEDURE — 86900 BLOOD TYPING SEROLOGIC ABO: CPT

## 2020-09-24 PROCEDURE — 99285 EMERGENCY DEPT VISIT HI MDM: CPT

## 2020-09-24 PROCEDURE — C9803 HOPD COVID-19 SPEC COLLECT: HCPCS | Performed by: STUDENT IN AN ORGANIZED HEALTH CARE EDUCATION/TRAINING PROGRAM

## 2020-09-24 PROCEDURE — 80053 COMPREHEN METABOLIC PANEL: CPT

## 2020-09-24 PROCEDURE — 86850 RBC ANTIBODY SCREEN: CPT

## 2020-09-24 PROCEDURE — 36415 COLL VENOUS BLD VENIPUNCTURE: CPT

## 2020-09-24 PROCEDURE — 85730 THROMBOPLASTIN TIME PARTIAL: CPT

## 2020-09-24 PROCEDURE — 83735 ASSAY OF MAGNESIUM: CPT

## 2020-09-24 PROCEDURE — 86901 BLOOD TYPING SEROLOGIC RH(D): CPT

## 2020-09-24 RX ORDER — ONDANSETRON 2 MG/ML
4 INJECTION INTRAMUSCULAR; INTRAVENOUS EVERY 4 HOURS PRN
Status: DISCONTINUED | OUTPATIENT
Start: 2020-09-24 | End: 2020-09-27 | Stop reason: HOSPADM

## 2020-09-24 RX ORDER — OMEPRAZOLE 20 MG/1
20 CAPSULE, DELAYED RELEASE ORAL DAILY
Status: DISCONTINUED | OUTPATIENT
Start: 2020-09-25 | End: 2020-09-27 | Stop reason: HOSPADM

## 2020-09-24 RX ORDER — ENALAPRILAT 1.25 MG/ML
1.25 INJECTION INTRAVENOUS EVERY 6 HOURS PRN
Status: DISCONTINUED | OUTPATIENT
Start: 2020-09-24 | End: 2020-09-27 | Stop reason: HOSPADM

## 2020-09-24 RX ORDER — ACETAMINOPHEN 325 MG/1
650 TABLET ORAL EVERY 6 HOURS PRN
Status: DISCONTINUED | OUTPATIENT
Start: 2020-09-24 | End: 2020-09-27 | Stop reason: HOSPADM

## 2020-09-24 RX ORDER — ONDANSETRON 4 MG/1
4 TABLET, ORALLY DISINTEGRATING ORAL EVERY 4 HOURS PRN
Status: DISCONTINUED | OUTPATIENT
Start: 2020-09-24 | End: 2020-09-27 | Stop reason: HOSPADM

## 2020-09-24 RX ADMIN — POLYETHYLENE GLYCOL 3350, SODIUM SULFATE ANHYDROUS, SODIUM BICARBONATE, SODIUM CHLORIDE, POTASSIUM CHLORIDE 4 L: 236; 22.74; 6.74; 5.86; 2.97 POWDER, FOR SOLUTION ORAL at 23:27

## 2020-09-24 ASSESSMENT — LIFESTYLE VARIABLES
DOES PATIENT WANT TO STOP DRINKING: NO
EVER FELT BAD OR GUILTY ABOUT YOUR DRINKING: NO
HAVE PEOPLE ANNOYED YOU BY CRITICIZING YOUR DRINKING: NO
HAVE YOU EVER FELT YOU SHOULD CUT DOWN ON YOUR DRINKING: NO
EVER HAD A DRINK FIRST THING IN THE MORNING TO STEADY YOUR NERVES TO GET RID OF A HANGOVER: NO
TOTAL SCORE: 0
ON A TYPICAL DAY WHEN YOU DRINK ALCOHOL HOW MANY DRINKS DO YOU HAVE: 0
TOTAL SCORE: 0
HOW MANY TIMES IN THE PAST YEAR HAVE YOU HAD 5 OR MORE DRINKS IN A DAY: 0
CONSUMPTION TOTAL: NEGATIVE
AVERAGE NUMBER OF DAYS PER WEEK YOU HAVE A DRINK CONTAINING ALCOHOL: 0
ALCOHOL_USE: NO
TOTAL SCORE: 0

## 2020-09-24 ASSESSMENT — COGNITIVE AND FUNCTIONAL STATUS - GENERAL
MOBILITY SCORE: 24
SUGGESTED CMS G CODE MODIFIER MOBILITY: CH
SUGGESTED CMS G CODE MODIFIER DAILY ACTIVITY: CH
DAILY ACTIVITIY SCORE: 24

## 2020-09-24 ASSESSMENT — PATIENT HEALTH QUESTIONNAIRE - PHQ9
1. LITTLE INTEREST OR PLEASURE IN DOING THINGS: NOT AT ALL
SUM OF ALL RESPONSES TO PHQ9 QUESTIONS 1 AND 2: 0
2. FEELING DOWN, DEPRESSED, IRRITABLE, OR HOPELESS: NOT AT ALL

## 2020-09-24 ASSESSMENT — FIBROSIS 4 INDEX
FIB4 SCORE: 2.02
FIB4 SCORE: 1.06
FIB4 SCORE: 2.02

## 2020-09-25 ENCOUNTER — ANESTHESIA EVENT (OUTPATIENT)
Dept: SURGERY | Facility: MEDICAL CENTER | Age: 80
End: 2020-09-25
Payer: MEDICARE

## 2020-09-25 ENCOUNTER — ANESTHESIA (OUTPATIENT)
Dept: SURGERY | Facility: MEDICAL CENTER | Age: 80
End: 2020-09-25
Payer: MEDICARE

## 2020-09-25 LAB
COVID ORDER STATUS COVID19: NORMAL
HGB BLD-MCNC: 11.7 G/DL (ref 12–16)
HGB BLD-MCNC: 13.4 G/DL (ref 12–16)
HGB BLD-MCNC: 13.9 G/DL (ref 12–16)
SARS-COV-2 RNA RESP QL NAA+PROBE: NOTDETECTED
SPECIMEN SOURCE: NORMAL

## 2020-09-25 PROCEDURE — 700111 HCHG RX REV CODE 636 W/ 250 OVERRIDE (IP): Performed by: ANESTHESIOLOGY

## 2020-09-25 PROCEDURE — 85018 HEMOGLOBIN: CPT

## 2020-09-25 PROCEDURE — 36415 COLL VENOUS BLD VENIPUNCTURE: CPT

## 2020-09-25 PROCEDURE — G0378 HOSPITAL OBSERVATION PER HR: HCPCS

## 2020-09-25 PROCEDURE — 700105 HCHG RX REV CODE 258: Performed by: ANESTHESIOLOGY

## 2020-09-25 PROCEDURE — 160035 HCHG PACU - 1ST 60 MINS PHASE I: Performed by: INTERNAL MEDICINE

## 2020-09-25 PROCEDURE — U0003 INFECTIOUS AGENT DETECTION BY NUCLEIC ACID (DNA OR RNA); SEVERE ACUTE RESPIRATORY SYNDROME CORONAVIRUS 2 (SARS-COV-2) (CORONAVIRUS DISEASE [COVID-19]), AMPLIFIED PROBE TECHNIQUE, MAKING USE OF HIGH THROUGHPUT TECHNOLOGIES AS DESCRIBED BY CMS-2020-01-R: HCPCS

## 2020-09-25 PROCEDURE — 160036 HCHG PACU - EA ADDL 30 MINS PHASE I: Performed by: INTERNAL MEDICINE

## 2020-09-25 PROCEDURE — 160203 HCHG ENDO MINUTES - 1ST 30 MINS LEVEL 4: Performed by: INTERNAL MEDICINE

## 2020-09-25 PROCEDURE — 160009 HCHG ANES TIME/MIN: Performed by: INTERNAL MEDICINE

## 2020-09-25 PROCEDURE — 160002 HCHG RECOVERY MINUTES (STAT): Performed by: INTERNAL MEDICINE

## 2020-09-25 PROCEDURE — 700101 HCHG RX REV CODE 250: Performed by: ANESTHESIOLOGY

## 2020-09-25 PROCEDURE — C9803 HOPD COVID-19 SPEC COLLECT: HCPCS | Performed by: INTERNAL MEDICINE

## 2020-09-25 PROCEDURE — 99226 PR SUBSEQUENT OBSERVATION CARE,LEVEL III: CPT | Performed by: STUDENT IN AN ORGANIZED HEALTH CARE EDUCATION/TRAINING PROGRAM

## 2020-09-25 PROCEDURE — 160048 HCHG OR STATISTICAL LEVEL 1-5: Performed by: INTERNAL MEDICINE

## 2020-09-25 RX ORDER — SODIUM CHLORIDE, SODIUM LACTATE, POTASSIUM CHLORIDE, CALCIUM CHLORIDE 600; 310; 30; 20 MG/100ML; MG/100ML; MG/100ML; MG/100ML
INJECTION, SOLUTION INTRAVENOUS CONTINUOUS
Status: DISCONTINUED | OUTPATIENT
Start: 2020-09-25 | End: 2020-09-25 | Stop reason: HOSPADM

## 2020-09-25 RX ORDER — SODIUM CHLORIDE, SODIUM LACTATE, POTASSIUM CHLORIDE, CALCIUM CHLORIDE 600; 310; 30; 20 MG/100ML; MG/100ML; MG/100ML; MG/100ML
INJECTION, SOLUTION INTRAVENOUS
Status: DISCONTINUED | OUTPATIENT
Start: 2020-09-25 | End: 2020-09-25 | Stop reason: SURG

## 2020-09-25 RX ORDER — DIPHENHYDRAMINE HYDROCHLORIDE 50 MG/ML
12.5 INJECTION INTRAMUSCULAR; INTRAVENOUS
Status: DISCONTINUED | OUTPATIENT
Start: 2020-09-25 | End: 2020-09-25 | Stop reason: HOSPADM

## 2020-09-25 RX ORDER — HALOPERIDOL 5 MG/ML
1 INJECTION INTRAMUSCULAR
Status: DISCONTINUED | OUTPATIENT
Start: 2020-09-25 | End: 2020-09-25 | Stop reason: HOSPADM

## 2020-09-25 RX ORDER — LABETALOL HYDROCHLORIDE 5 MG/ML
5 INJECTION, SOLUTION INTRAVENOUS
Status: DISCONTINUED | OUTPATIENT
Start: 2020-09-25 | End: 2020-09-25 | Stop reason: HOSPADM

## 2020-09-25 RX ORDER — METOPROLOL TARTRATE 1 MG/ML
1 INJECTION, SOLUTION INTRAVENOUS
Status: DISCONTINUED | OUTPATIENT
Start: 2020-09-25 | End: 2020-09-25 | Stop reason: HOSPADM

## 2020-09-25 RX ORDER — LABETALOL HYDROCHLORIDE 5 MG/ML
INJECTION, SOLUTION INTRAVENOUS PRN
Status: DISCONTINUED | OUTPATIENT
Start: 2020-09-25 | End: 2020-09-25 | Stop reason: SURG

## 2020-09-25 RX ORDER — ONDANSETRON 2 MG/ML
4 INJECTION INTRAMUSCULAR; INTRAVENOUS
Status: DISCONTINUED | OUTPATIENT
Start: 2020-09-25 | End: 2020-09-25 | Stop reason: HOSPADM

## 2020-09-25 RX ADMIN — FENTANYL CITRATE 50 MCG: 50 INJECTION, SOLUTION INTRAMUSCULAR; INTRAVENOUS at 14:48

## 2020-09-25 RX ADMIN — SODIUM CHLORIDE, POTASSIUM CHLORIDE, SODIUM LACTATE AND CALCIUM CHLORIDE: 600; 310; 30; 20 INJECTION, SOLUTION INTRAVENOUS at 14:38

## 2020-09-25 RX ADMIN — PROPOFOL 120 MCG/KG/MIN: 10 INJECTION, EMULSION INTRAVENOUS at 14:42

## 2020-09-25 RX ADMIN — LABETALOL HYDROCHLORIDE 5 MG: 5 INJECTION, SOLUTION INTRAVENOUS at 14:44

## 2020-09-25 RX ADMIN — FENTANYL CITRATE 50 MCG: 50 INJECTION, SOLUTION INTRAMUSCULAR; INTRAVENOUS at 14:46

## 2020-09-25 ASSESSMENT — ENCOUNTER SYMPTOMS
DIZZINESS: 0
ABDOMINAL PAIN: 1
LOSS OF CONSCIOUSNESS: 0
NECK PAIN: 0
SPUTUM PRODUCTION: 0
NAUSEA: 0
FEVER: 0
PHOTOPHOBIA: 0
COUGH: 0
SHORTNESS OF BREATH: 0
VOMITING: 0
BLOOD IN STOOL: 1
EYE PAIN: 0
CONSTIPATION: 0
ORTHOPNEA: 0
CHILLS: 0
FLANK PAIN: 0
WEAKNESS: 0
FOCAL WEAKNESS: 0
PALPITATIONS: 0
HALLUCINATIONS: 0
WEIGHT LOSS: 0
NERVOUS/ANXIOUS: 0
HEADACHES: 0

## 2020-09-25 ASSESSMENT — PAIN SCALES - GENERAL: PAIN_LEVEL: 0

## 2020-09-25 ASSESSMENT — FIBROSIS 4 INDEX: FIB4 SCORE: 2.02

## 2020-09-25 NOTE — PROGRESS NOTES
[facility] Medicine Daily Progress Note    Date of Service  9/25/2020    Chief Complaint  80 y.o. female with diverticulosis with long history of diverticular bleeds, GERD, hypertension, history of venous thrombosis and embolism in 2013, breast cancer in 2006, presented 9/24/2020 with 1 day history of bright red blood per rectum.     Irina Fuller was in her usual state of health and woke up on the morning of September 24 and had a bowel movement that was full of bright red blood.  Patient reports it filled her toilet bowl.  She is not otherwise feeling any pains or cramping, no recent change in her bowel habits so she went on about her day.  Throughout the day she had multiple episodes of hematochezia, approximately 5-7 episodes each one with bright red blood and some with visible blood clots per the patient.  There is no associated lightheadedness or dizziness or any other associated symptoms.  Due to her history of GI bleeding she came to the ED to be evaluated. BRBPR on 9/23/2020 about 5-7 episodes small to moderate volume each episode with visible blood clots per patient, no associated dizziness, light headedness, or abdominal cramping, no NSAID use, no history of patient. Extensive history of diverticular bleed in the past most recently in June 2020 hemoglobin with Hgb of 8.8, colonoscopy performed with no identification of source of bleed. No h/o ulcer or UGIB. H/H and vitals stable. Bowel movements have stopped. She has normal coagulation panel.      Hospital Course    Arrival to the ED she was hypertensive 162/109, heart rate 87, respirations were 18 saturation 98% on room air and she was generally comfortable.  Initial labs revealed hemoglobin of 14.3 and leukopenia 4.5 which appears to be chronic.  Gastroenterology was consulted by the ED physician and they requested the patient be prepped overnight and they will perform colonoscopy in the morning.  Of note she has been following with Dr. Pavon with  gastroenterology since 2017.    Pt underwent EGD/Colonoscopy on 9/25/2020.      Interval Problem Update  For EGD/Colonoscopy 9/25/2020, GI on board, likely diagnosis Diverticar bleed vs. Malignancy, angiodysplasia, or colitis.      Consultants/Specialty  GI    Code Status  Full Code    Disposition  TBD    Review of Systems  ROS     Constitutional: Negative for chills, fever, malaise/fatigue and weight loss.   HENT: Negative for congestion and nosebleeds.    Eyes: Negative for photophobia and pain.   Respiratory: Negative for cough, sputum production and shortness of breath.    Cardiovascular: Negative for chest pain, palpitations and orthopnea.   Gastrointestinal: Positive for abdominal pain (chronic cramping, mild and unchanged) and blood in stool. Negative for constipation, nausea and vomiting.   Genitourinary: Negative for dysuria and flank pain.   Musculoskeletal: Positive for joint pain (chronic OA). Negative for neck pain.   Skin: Negative for itching and rash.   Neurological: Negative for dizziness, focal weakness, loss of consciousness, weakness and headaches.   Psychiatric/Behavioral: Negative for hallucinations. The patient is not nervous/anxious    Physical Exam  Temp:  [36.1 °C (97 °F)-36.9 °C (98.4 °F)] 36.1 °C (97 °F)  Pulse:  [62-91] 91  Resp:  [18-48] 18  BP: (142-162)/() 162/94  SpO2:  [98 %-100 %] 98 %    Physical Exam    Vitals signs and nursing note reviewed.   Constitutional:       General: She is not in acute distress.     Appearance: Normal appearance. She is normal weight. She is not ill-appearing.   HENT:      Head: Normocephalic and atraumatic.      Nose: Nose normal. No congestion or rhinorrhea.      Mouth/Throat:      Mouth: Mucous membranes are moist.      Pharynx: Oropharynx is clear. No oropharyngeal exudate or posterior oropharyngeal erythema.   Eyes:      General: No scleral icterus.     Extraocular Movements: Extraocular movements intact.      Conjunctiva/sclera: Conjunctivae  normal.      Pupils: Pupils are equal, round, and reactive to light.   Neck:      Musculoskeletal: Normal range of motion and neck supple. No neck rigidity or muscular tenderness.   Cardiovascular:      Rate and Rhythm: Normal rate and regular rhythm.      Pulses: Normal pulses.      Heart sounds: No murmur. No gallop.    Pulmonary:      Effort: Pulmonary effort is normal. No respiratory distress.      Breath sounds: Normal breath sounds. No wheezing.   Abdominal:      General: Bowel sounds are normal. There is no distension.      Palpations: Abdomen is soft.      Tenderness: There is no abdominal tenderness. There is no guarding or rebound.   Musculoskeletal: Normal range of motion.         General: Deformity (right second MCP jpint from arthritis, chronic & unchanged,) present. No tenderness.   Skin:     General: Skin is warm and dry.      Capillary Refill: Capillary refill takes less than 2 seconds.      Coloration: Skin is not pale.      Findings: No erythema or rash.   Neurological:      General: No focal deficit present.      Mental Status: She is alert and oriented to person, place, and time. Mental status is at baseline.      Cranial Nerves: No cranial nerve deficit.      Sensory: No sensory deficit.      Motor: No weakness.   Psychiatric:         Mood and Affect: Mood normal.         Behavior: Behavior normal.         Thought Content: Thought content normal.         Judgment: Judgment normal.     Fluids  No intake or output data in the 24 hours ending 09/25/20 0650    Laboratory  Recent Labs     09/24/20  1800   WBC 4.5*   RBC 4.66   HEMOGLOBIN 14.3   HEMATOCRIT 42.1   MCV 90.3   MCH 30.7   MCHC 34.0   RDW 42.9   PLATELETCT 220   MPV 11.0     Recent Labs     09/24/20  1800   SODIUM 138   POTASSIUM 4.1   CHLORIDE 99   CO2 25   GLUCOSE 100*   BUN 19   CREATININE 0.72   CALCIUM 9.4     Recent Labs     09/24/20  1800   APTT 28.9   INR 0.93               Imaging  DX-CHEST-PORTABLE (1 VIEW)   Final Result       Enlarged cardiac silhouette without evidence of acute disease.           Assessment/Plan  * Acute lower GI bleeding- (present on admission)  Assessment & Plan  BRBPR on 9/23/2020 about 5-7 episodes small to moderate volume each episode with visible blood clots per patient, no associated dizziness, light headedness, or abdominal cramping, no NSAID use, no history of patient.    Extensive history of diverticular bleed in the past most recently in June 2020 hemoglobin with Hgb of 8.8, colonoscopy performed with no identification of source of bleed.  No h/o ulcer or UGIB.  H/H and vitals stable. Bowel movements have stopped.  She has normal coagulation panel.   GI consulted from ED and will prep the patient tonight for a colonoscopy in the AM, patient is agreeable.  Monitor with CBC q8h, vitals every 4 hours, cardiac monitoring.  Suspecting diverticular bleed but other differentials diagnosis include malignancy, angiodysplasia, colitis.             Hypertension- (present on admission)  Assessment & Plan  Will hold home amlodipine for now given presence of GI bleed.   Will place PRN antihypertensive for SBP>185 or DBP>105.      Diverticulosis- (present on admission)  Assessment & Plan  H/O multiple diverticular bleeds reportedly since she was in her 30's.   No evidence for infection at this time, no changes in bowel habits or abdominal cramping, she is afebrile without leukocytosis.   Dr. Pavon is the patient's outpatient Gastroenterologist.     Gastroesophageal reflux disease without esophagitis- (present on admission)  Assessment & Plan  No abdominal cramping or tenderness.   No h/o ulcers.   Will continue patient's home PPI         VTE prophylaxis: SCD's, ambulatory.

## 2020-09-25 NOTE — ANESTHESIA TIME REPORT
Anesthesia Start and Stop Event Times     Date Time Event    9/25/2020 1436 Ready for Procedure     1438 Anesthesia Start     1502 Anesthesia Stop        Responsible Staff  09/25/20    Name Role Begin End    Sidra Concepcion M.D. Anesth 1438 1502        Preop Diagnosis (Free Text):  Pre-op Diagnosis     HEMATOCHEZIA        Preop Diagnosis (Codes):    Post op Diagnosis  Diverticulitis      Premium Reason  A. 3PM - 7AM    Comments:

## 2020-09-25 NOTE — CONSULTS
Gastroenterology Consult Note     Date of Consult: 09/24/2020  Referring Physician: Elzbieta     Reason for consult: hematochezia        HPI: This is an 79 yo female with histoyr of breast cancer, GERD, diverticulosis and PVD who presents for hematochezia. She says that she was driving yesterday afternoon and when she got home, she realized she passed BRBPR. She says this was not associated with any pain. She denies heartburn, reflux, nausea or vomiting. She says this is similar to prior episodes of bleeding. She notes that she was passing BRB and clots every hour yesterday. This has decreased in flow since yesterday but she says that she is still passing clots today. She had a limited colonoscopy in June.      PMHX:  Past Medical History:   Diagnosis Date   • Anesthesia 1974/2002    PONV   • Arthritis    • Breast cancer (HCC)    • Cancer (HCC) 2006    right breast    • CATARACT     surgical correction bilateral   • Dental disorder     bridge upper front; 5/7/2015 dental extraction with infection; temporary right lower   • Diverticulitis    • GERD (gastroesophageal reflux disease)    • Heart burn 12/17/13   • Hypertension    • Indigestion    • MEDICAL HOME 6/9/2015   • Osteoporosis    • Pain     chronic pain knees; ankles, feet, right shoulder, arm and hand   • Personal history of venous thrombosis and embolism 12/31/2013    leg   • Pneumonia 1984   • PVD (peripheral vascular disease) (HCC)    • Unspecified hemorrhagic conditions     had GI bleed diverticulitis          PSurgHx:   Past Surgical History:   Procedure Laterality Date   • COLONOSCOPY WITH POLYP N/A 6/13/2020    Procedure: COLONOSCOPY, WITH POLYPECTOMY;  Surgeon: Sam Soliman D.O.;  Location: SURGERY [facility];  Service: Gastroenterology   • GANGLION EXCISION Right 10/17/2019    Procedure: EXCISION, GANGLION CYST- WRIST;  Surgeon: Og Lima M.D.;  Location: SURGERY [facility];  Service:  Orthopedics   • KNEE ARTHROPLASTY TOTAL Right 6/8/2015    Procedure: KNEE ARTHROPLASTY TOTAL;  Surgeon: Markus York M.D.;  Location: [facility];  Service:    • GASTROSCOPY WITH BIOPSY  6/16/2014    Performed by Dino Pavon M.D. at [facility]   • EGD W/ENDOSCOPIC ULTRASOUND  6/16/2014    Performed by Dino Pavon M.D. at [facility]   • KNEE ARTHROPLASTY TOTAL  12/30/2013    left; Performed by Markus York M.D. at [facility]   • COLONOSCOPY - ENDO  12/6/2013    Performed by Markus Juarez D.O. at ENDOSCOPY [facility]   • LUMPECTOMY  2006    right breast   • PB RADIATION THERAPY PLAN SIMPLE  2006   • CATARACT EXTRACTION WITH IOL  2003    bilateral   • APPENDECTOMY  1974   • HYSTERECTOMY, TOTAL ABDOMINAL  1974                           • TONSILLECTOMY AND ADENOIDECTOMY  as child   • VEIN STRIPPING  1974/1999/2013    b/l legs        ALLERGIES:Ibuprofen, Morphine, and Other misc     SocHx:   Social History     Socioeconomic History   • Marital status:      Spouse name: Not on file   • Number of children: Not on file   • Years of education: Not on file   • Highest education level: Not on file   Occupational History   • Not on file   Social Needs   • Financial resource strain: Not on file   • Food insecurity     Worry: Often true     Inability: Often true   • Transportation needs     Medical: No     Non-medical: No   Tobacco Use   • Smoking status: Never Smoker   • Smokeless tobacco: Never Used   Substance and Sexual Activity   • Alcohol use: No   • Drug use: No   • Sexual activity: Not Currently     Partners: Male     Birth control/protection: Post-Menopausal   Lifestyle   • Physical activity     Days per week: Not on file     Minutes per session: Not on file   • Stress: Not on file   Relationships   • Social connections     Talks on phone: Not on file     Gets together: Not on file     Attends Taoist service: Not on file      "Active member of club or organization: Not on file     Attends meetings of clubs or organizations: Not on file     Relationship status: Not on file   • Intimate partner violence     Fear of current or ex partner: Not on file     Emotionally abused: Not on file     Physically abused: Not on file     Forced sexual activity: Not on file   Other Topics Concern   • Not on file   Social History Narrative   • Not on file        FAMHx:   Family History   Problem Relation Age of Onset   • Heart Disease Mother    • Hypertension Mother    • Stroke Mother    • Diabetes Mother    • Dementia Mother    • Arthritis Mother    • Diabetes Brother    • Dementia Brother    • Cancer Father         Colon   • Heart Disease Maternal Grandmother    • Kidney Disease Maternal Grandmother    • Heart Attack Maternal Grandfather    • No Known Problems Brother    • Hypertension Brother    • No Known Problems Brother         ROS:  Constitutional: No fevers, chills, no night sweats, no weight changes  HEENT: no vision or hearing changes, no dry mouth, no change in smell  CARDIO: no palpitations, no orthopnea, no chest pain  PULM: no cough, no shortness of breath  NEURO: no Seizures, no memory impairment, no change in sensation  GI: as above  : no dysuria, no hematuria  HEME: no anemia, no easy brusing  MUSCULOSKELETAL: no muscle aches, no back pain, no arthritis  PSYCH: no anxiety or depression  SKIN: no rashes     PE:  Vitals:    09/24/20 1759 09/24/20 1800 09/24/20 1802 09/24/20 1900   BP:  155/96 155/96 142/90   Pulse:  62 63 62   Resp:  (!) 48 20 18   Temp:   36.9 °C (98.4 °F)    TempSrc:   Temporal    SpO2:  100% 99% 98%   Weight: 51.3 kg (113 lb)      Height: 1.65 m (5' 4.96\")        Gen: AAOx3, NAD, lying in bed  HEENT: PERRL, EOMI, nares patent, Mucous membranes moist  Neck: supple, no cervical or supraclavicular adenopathy  CVS: regular rhythm, normal rate, no MRG  Pulm: CTAB, no crackles  Abd: soft, Nd, NT, no guarding or rebound  Ext: " no edema, normal sensation  NEURO: grossly normal, no weakness  Skin: warm, no rash  Psych: normal Affect, no anxiety     LABS:  Lab Results   Component Value Date/Time    SODIUM 138 09/24/2020 06:00 PM    POTASSIUM 4.1 09/24/2020 06:00 PM    CHLORIDE 99 09/24/2020 06:00 PM    CO2 25 09/24/2020 06:00 PM    GLUCOSE 100 (H) 09/24/2020 06:00 PM    BUN 19 09/24/2020 06:00 PM    CREATININE 0.72 09/24/2020 06:00 PM      Lab Results   Component Value Date/Time    WBC 4.5 (L) 09/24/2020 06:00 PM    RBC 4.66 09/24/2020 06:00 PM    HEMOGLOBIN 14.3 09/24/2020 06:00 PM    HEMATOCRIT 42.1 09/24/2020 06:00 PM    MCV 90.3 09/24/2020 06:00 PM    MCH 30.7 09/24/2020 06:00 PM    MCHC 34.0 09/24/2020 06:00 PM    MPV 11.0 09/24/2020 06:00 PM    NEUTSPOLYS 70.50 09/24/2020 06:00 PM    LYMPHOCYTES 17.00 (L) 09/24/2020 06:00 PM    MONOCYTES 10.90 09/24/2020 06:00 PM    EOSINOPHILS 0.70 09/24/2020 06:00 PM    BASOPHILS 0.70 09/24/2020 06:00 PM        Lab Results   Component Value Date/Time    PROTHROMBTM 12.7 09/24/2020 06:00 PM    INR 0.93 09/24/2020 06:00 PM      Recent Labs     09/24/20  1800   ASTSGOT 21   ALTSGPT 15   TBILIRUBIN 0.4   GLOBULIN 2.5   INR 0.93          Problem List Items Addressed This Visit     None           ASSESSMENT: 81 yo female with history of diverticulosis who presents with hematochezia. She likely has a diverticular bleed. It does not seem that she is bleeding briskly enough to find on imaging. I feel that she needs to be admitted for observation given her age so I feel that if she will be here, we can plan to perform another colonoscopy tomorrow to look for the possible bleeding source. She may need outpatient consultation for colon resection of the sigmoid and descending colon.     PLAN:   1) clear liquid diet now and NPO at 6AM  2) Golytely now  3) Consent for colonoscopy and plan for colonoscopy tomorrow  4) consider outpatient eval for sigmoid colon resection      Thank you for this consult.     Simon  [facility]

## 2020-09-25 NOTE — CARE PLAN
Problem: Communication  Goal: The ability to communicate needs accurately and effectively will improve  Outcome: PROGRESSING AS EXPECTED  Note: Pt communicates needs effectively with staff.       Problem: Safety  Goal: Will remain free from injury  Outcome: PROGRESSING AS EXPECTED  Goal: Will remain free from falls  Outcome: PROGRESSING AS EXPECTED     Problem: Knowledge Deficit  Goal: Knowledge of disease process/condition, treatment plan, diagnostic tests, and medications will improve  Outcome: PROGRESSING AS EXPECTED  Note: Pt educated regarding plan of care and medications. All questions answered.

## 2020-09-25 NOTE — ED PROVIDER NOTES
ED Provider Note    CHIEF COMPLAINT  Chief Complaint   Patient presents with   • Bloody Stools     BIB EMS from home.  Hx of bright red blood in stools for 2 days.  Hx of same in June 2020.  Has numerous polyps and diverticulosis.  Some lower abd pain cammy on left side.   Abdominal pain    HPI  Beth Fuller is a 80 y.o. female who presents complaining of abdominal pain.  The abdominal pain is located in the left upper quadrant.  Its a dull aching pain.  Approximately 3/10 in severity.  Is associated with cramping.  She has had some bright red blood per rectum.  Patient states she had more severe bleeding with passing clots yesterday and was getting slightly better yesterday.  Patient denies any vomiting.    REVIEW OF SYSTEMS  See HPI for further details.  No fever or chills.  No cough or cold symptoms.  No vomiting.  All other systems are negative.    PAST MEDICAL HISTORY  Past Medical History:   Diagnosis Date   • Anesthesia 1974/2002    PONV   • Arthritis    • Breast cancer (HCC)    • Cancer (HCC) 2006    right breast    • CATARACT     surgical correction bilateral   • Dental disorder     bridge upper front; 5/7/2015 dental extraction with infection; temporary right lower   • Diverticulitis    • GERD (gastroesophageal reflux disease)    • Heart burn 12/17/13   • Hypertension    • Indigestion    • MEDICAL HOME 6/9/2015   • Osteoporosis    • Pain     chronic pain knees; ankles, feet, right shoulder, arm and hand   • Personal history of venous thrombosis and embolism 12/31/2013    leg   • Pneumonia 1984   • PVD (peripheral vascular disease) ([facility])    • Unspecified hemorrhagic conditions     had GI bleed diverticulitis       FAMILY HISTORY  Family History   Problem Relation Age of Onset   • Heart Disease Mother    • Hypertension Mother    • Stroke Mother    • Diabetes Mother    • Dementia Mother    • Arthritis Mother    • Diabetes Brother    • Dementia Brother    • Cancer Father         Colon   • Heart  Disease Maternal Grandmother    • Kidney Disease Maternal Grandmother    • Heart Attack Maternal Grandfather    • No Known Problems Brother    • Hypertension Brother    • No Known Problems Brother        SOCIAL HISTORY  Social History     Socioeconomic History   • Marital status:      Spouse name: Not on file   • Number of children: Not on file   • Years of education: Not on file   • Highest education level: Not on file   Occupational History   • Not on file   Social Needs   • Financial resource strain: Not on file   • Food insecurity     Worry: Often true     Inability: Often true   • Transportation needs     Medical: No     Non-medical: No   Tobacco Use   • Smoking status: Never Smoker   • Smokeless tobacco: Never Used   Substance and Sexual Activity   • Alcohol use: No   • Drug use: No   • Sexual activity: Not Currently     Partners: Male     Birth control/protection: Post-Menopausal   Lifestyle   • Physical activity     Days per week: Not on file     Minutes per session: Not on file   • Stress: Not on file   Relationships   • Social connections     Talks on phone: Not on file     Gets together: Not on file     Attends Evangelical service: Not on file     Active member of club or organization: Not on file     Attends meetings of clubs or organizations: Not on file     Relationship status: Not on file   • Intimate partner violence     Fear of current or ex partner: Not on file     Emotionally abused: Not on file     Physically abused: Not on file     Forced sexual activity: Not on file   Other Topics Concern   • Not on file   Social History Narrative   • Not on file       SURGICAL HISTORY  Past Surgical History:   Procedure Laterality Date   • COLONOSCOPY WITH POLYP N/A 6/13/2020    Procedure: COLONOSCOPY, WITH POLYPECTOMY;  Surgeon: Sam Soliman D.O.;  Location: SURGERY [facility];  Service: Gastroenterology   • GANGLION EXCISION Right 10/17/2019    Procedure: EXCISION, GANGLION CYST- WRIST;  Surgeon:  "Og Lima M.D.;  Location: [facility];  Service: Orthopedics   • KNEE ARTHROPLASTY TOTAL Right 6/8/2015    Procedure: KNEE ARTHROPLASTY TOTAL;  Surgeon: Markus York M.D.;  Location: [facility];  Service:    • GASTROSCOPY WITH BIOPSY  6/16/2014    Performed by Dino Pavon M.D. at [facility]   • EGD W/ENDOSCOPIC ULTRASOUND  6/16/2014    Performed by Dino Pavon M.D. at [facility]   • KNEE ARTHROPLASTY TOTAL  12/30/2013    left; Performed by Markus York M.D. at [facility]   • COLONOSCOPY - ENDO  12/6/2013    Performed by Markus Juarez D.O. at ENDOSCOPY [facility]   • LUMPECTOMY  2006    right breast   • PB RADIATION THERAPY PLAN SIMPLE  2006   • CATARACT EXTRACTION WITH IOL  2003    bilateral   • APPENDECTOMY  1974   • HYSTERECTOMY, TOTAL ABDOMINAL  1974                           • TONSILLECTOMY AND ADENOIDECTOMY  as child   • VEIN STRIPPING  1974/1999/2013    b/l legs       CURRENT MEDICATIONS  @ [employer]@    ALLERGIES  Allergies   Allergen Reactions   • Ibuprofen      Stomach upset   • Morphine Vomiting   • Other Misc      Metals: zinc oxide, vanadium chloride, manganese chloride       PHYSICAL EXAM  VITAL SIGNS: /90   Pulse 62   Temp 36.9 °C (98.4 °F) (Temporal)   Resp 18   Ht 1.65 m (5' 4.96\")   Wt 51.3 kg (113 lb)   SpO2 98%   BMI 18.83 kg/m²   Constitutional: Well developed, Well nourished, No acute distress, Non-toxic appearance.  Pleasant middle-aged female.  HENT: Normocephalic, Atraumatic, Bilateral external ears normal, Oropharynx moist,   Eyes: HAY, EOMI, Conjunctiva normal,   Neck: Normal range of motion, No tenderness, Supple, No stridor.   Lymphatic: No lymphadenopathy noted.   Cardiovascular: Normal heart rate, Normal rhythm, No murmurs, No rubs, No gallops.   Thorax & Lungs: Normal breath sounds, No respiratory distress,   Abdomen: Normal bowel sounds.  Mild tenderness in the left " upper quadrant  Musculoskeletal: No pitting edema  Skin: Warm, Dry, No erythema, No rash.   Back: No tenderness, No CVA tenderness.   Extremities: No edema, No tenderness, No cyanosis, No clubbing. Dorsalis pedis pulses 2+ equal bilaterally. Radial pulses 2+ equal bilaterally    RADIOLOGY/PROCEDURES  Results for orders placed or performed during the hospital encounter of 09/24/20   COD (ADULT)   Result Value Ref Range    ABO Grouping Only O     Rh Grouping Only POS     Antibody Screen-Cod NEG    CBC WITH DIFFERENTIAL   Result Value Ref Range    WBC 4.5 (L) 4.8 - 10.8 K/uL    RBC 4.66 4.20 - 5.40 M/uL    Hemoglobin 14.3 12.0 - 16.0 g/dL    Hematocrit 42.1 37.0 - 47.0 %    MCV 90.3 81.4 - 97.8 fL    MCH 30.7 27.0 - 33.0 pg    MCHC 34.0 33.6 - 35.0 g/dL    RDW 42.9 35.9 - 50.0 fL    Platelet Count 220 164 - 446 K/uL    MPV 11.0 9.0 - 12.9 fL    Neutrophils-Polys 70.50 44.00 - 72.00 %    Lymphocytes 17.00 (L) 22.00 - 41.00 %    Monocytes 10.90 0.00 - 13.40 %    Eosinophils 0.70 0.00 - 6.90 %    Basophils 0.70 0.00 - 1.80 %    Immature Granulocytes 0.20 0.00 - 0.90 %    Nucleated RBC 0.00 /100 WBC    Neutrophils (Absolute) 3.16 2.00 - 7.15 K/uL    Lymphs (Absolute) 0.76 (L) 1.00 - 4.80 K/uL    Monos (Absolute) 0.49 0.00 - 0.85 K/uL    Eos (Absolute) 0.03 0.00 - 0.51 K/uL    Baso (Absolute) 0.03 0.00 - 0.12 K/uL    Immature Granulocytes (abs) 0.01 0.00 - 0.11 K/uL    NRBC (Absolute) 0.00 K/uL   COMP METABOLIC PANEL   Result Value Ref Range    Sodium 138 135 - 145 mmol/L    Potassium 4.1 3.6 - 5.5 mmol/L    Chloride 99 96 - 112 mmol/L    Co2 25 20 - 33 mmol/L    Anion Gap 14.0 7.0 - 16.0    Glucose 100 (H) 65 - 99 mg/dL    Bun 19 8 - 22 mg/dL    Creatinine 0.72 0.50 - 1.40 mg/dL    Calcium 9.4 8.5 - 10.5 mg/dL    AST(SGOT) 21 12 - 45 U/L    ALT(SGPT) 15 2 - 50 U/L    Alkaline Phosphatase 83 30 - 99 U/L    Total Bilirubin 0.4 0.1 - 1.5 mg/dL    Albumin 4.5 3.2 - 4.9 g/dL    Total Protein 7.0 6.0 - 8.2 g/dL    Globulin 2.5  1.9 - 3.5 g/dL    A-G Ratio 1.8 g/dL   LIPASE   Result Value Ref Range    Lipase 68 11 - 82 U/L   PROTHROMBIN TIME   Result Value Ref Range    PT 12.7 12.0 - 14.6 sec    INR 0.93 0.87 - 1.13   APTT   Result Value Ref Range    APTT 28.9 24.7 - 36.0 sec   ESTIMATED GFR   Result Value Ref Range    GFR If African American >60 >60 mL/min/1.73 m 2    GFR If Non African American >60 >60 mL/min/1.73 m 2         COURSE & MEDICAL DECISION MAKING  Pertinent Labs & Imaging studies reviewed. (See chart for details)  Patient is no longer actively bleeding but has lost quite a bit of blood in the last 36 hours.  GI consultation was obtained.  GI will perform colonoscopy tomorrow.  Patient will be admitted by the hospitalist.  Patient was admitted in stable condition    FINAL IMPRESSION  1.  Hematochezia  2.  Diverticular bleed  3.    Please note that this dictation was created using voice recognition software. I have worked with consultants from the vendor as well as technical experts from [facility] [email or web address] to optimize the interface. I have made every reasonable attempt to correct obvious errors, but I expect that there are errors of grammar and possibly content that I did not discover before finalizing the note.    Electronically signed by: Miguel Ruff M.D., 9/24/2020 9:27 PM

## 2020-09-25 NOTE — ANESTHESIA PREPROCEDURE EVALUATION
Relevant Problems   CARDIAC   (+) Aortic atherosclerosis (HCC)   (+) Chronic venous insufficiency   (+) Ectatic abdominal aorta (HCC)   (+) Hypertension      GI   (+) Gastroesophageal reflux disease without esophagitis      Other   (+) Arthritis       Physical Exam    Airway   Mallampati: II  TM distance: >3 FB  Neck ROM: full       Cardiovascular - normal exam  Rhythm: regular  Rate: normal  (-) murmur     Dental - normal exam  (+) upper dentures           Pulmonary - normal exam  Breath sounds clear to auscultation     Abdominal    Neurological - normal exam             79 yo with diverticuli and HTN.    Anesthesia Plan    ASA 2       Plan - MAC             Induction: intravenous    Postoperative Plan: Postoperative administration of opioids is intended.    Pertinent diagnostic labs and testing reviewed    Informed Consent:    Anesthetic plan and risks discussed with patient.    Use of blood products discussed with: patient whom consented to blood products.

## 2020-09-25 NOTE — H&P
Hospital Medicine History & Physical Note    Date of Service  9/24/2020    Primary Care Physician  Jm Manzo M.D.    Consultants  Gastroenterology     Code Status  Full Code    Chief Complaint  Chief Complaint   Patient presents with   • Bloody Stools     BIB EMS from home.  Hx of bright red blood in stools for 2 days.  Hx of same in June 2020.  Has numerous polyps and diverticulosis.  Some lower abd pain cammy on left side.       History of Presenting Illness  80 y.o. female with diverticulosis with long history of diverticular bleeds, GERD, hypertension, history of venous thrombosis and embolism in 2013, breast cancer in 2006, presented 9/24/2020 with 1 day history of bright red blood per rectum.    Irina Fuller was in her usual state of health and woke up on the morning of September 24 and had a bowel movement that was full of bright red blood.  Patient reports it filled her toilet bowl.  She is not otherwise feeling any pains or cramping, no recent change in her bowel habits so she went on about her day.  Throughout the day she had multiple episodes of hematochezia, approximately 5-7 episodes each one with bright red blood and some with visible blood clots per the patient.  There is no associated lightheadedness or dizziness or any other associated symptoms.  Due to her history of GI bleeding she came to the ED to be evaluated.    Arrival to the ED she was hypertensive 162/109, heart rate 87, respirations were 18 saturation 98% on room air and she was generally comfortable.  Initial labs revealed hemoglobin of 14.3 and leukopenia 4.5 which appears to be chronic.  Gastroenterology was consulted by the ED physician and they requested the patient be prepped overnight and they will perform colonoscopy in the morning.  Of note she has been following with Dr. Pavon with gastroenterology since 2017.    Review of Systems  Review of Systems   Constitutional: Negative for chills, fever, malaise/fatigue and weight  loss.   HENT: Negative for congestion and nosebleeds.    Eyes: Negative for photophobia and pain.   Respiratory: Negative for cough, sputum production and shortness of breath.    Cardiovascular: Negative for chest pain, palpitations and orthopnea.   Gastrointestinal: Positive for abdominal pain (chronic cramping, mild and unchanged) and blood in stool. Negative for constipation, nausea and vomiting.   Genitourinary: Negative for dysuria and flank pain.   Musculoskeletal: Positive for joint pain (chronic OA). Negative for neck pain.   Skin: Negative for itching and rash.   Neurological: Negative for dizziness, focal weakness, loss of consciousness, weakness and headaches.   Psychiatric/Behavioral: Negative for hallucinations. The patient is not nervous/anxious.        Past Medical History   has a past medical history of Anesthesia (1974/2002), Arthritis, Breast cancer (HCC), Cancer ([facility]) (2006), CATARACT, Dental disorder, Diverticulitis, GERD (gastroesophageal reflux disease), Heart burn (12/17/13), Hypertension, Indigestion, MEDICAL HOME (6/9/2015), Osteoporosis, Pain, Personal history of venous thrombosis and embolism (12/31/2013), Pneumonia (1984), PVD (peripheral vascular disease) (HCC), and Unspecified hemorrhagic conditions.    Surgical History   has a past surgical history that includes appendectomy (1974); tonsillectomy and adenoidectomy (as child); hysterectomy, total abdominal (1974                        ); colonoscopy - endo (12/6/2013); vein stripping (1974/1999/2013); cataract extraction with iol (2003); knee arthroplasty total (12/30/2013); lumpectomy (2006); gastroscopy with biopsy (6/16/2014); egd w/endoscopic ultrasound (6/16/2014); pr radiation therapy plan simple (2006); knee arthroplasty total (Right, 6/8/2015); ganglion excision (Right, 10/17/2019); and colonoscopy with polyp (N/A, 6/13/2020).     Family History  family history includes Arthritis in her mother; Cancer in her father; Dementia in  her brother and mother; Diabetes in her brother and mother; Heart Attack in her maternal grandfather; Heart Disease in her maternal grandmother and mother; Hypertension in her brother and mother; Kidney Disease in her maternal grandmother; No Known Problems in her brother and brother; Stroke in her mother.     Social History   reports that she has never smoked. She has never used smokeless tobacco. She reports that she does not drink alcohol or use drugs.    Allergies  Allergies   Allergen Reactions   • Ibuprofen      Stomach upset   • Morphine Vomiting   • Other Misc      Metals: zinc oxide, vanadium chloride, manganese chloride       Medications  Prior to Admission Medications   Prescriptions Last Dose Informant Patient Reported? Taking?   amLODIPine (NORVASC) 5 MG Tab  Patient's Home Pharmacy No No   Sig: TAKE ONE TABLET BY MOUTH ONCE DAILY   estradiol (ESTRACE) 0.1 MG/GM vaginal cream  Patient's Home Pharmacy No No   Sig: insert 1 gram vaginally three times per week as directed   omeprazole (PRILOSEC) 20 MG delayed-release capsule   No No   Sig: Take 1 Cap by mouth every day.      Facility-Administered Medications: None       Physical Exam  Temp:  [36.7 °C (98 °F)-36.9 °C (98.4 °F)] 36.7 °C (98 °F)  Pulse:  [62-87] 87  Resp:  [18-48] 18  BP: (142-162)/() 142/91  SpO2:  [98 %-100 %] 98 %    Physical Exam  Vitals signs and nursing note reviewed.   Constitutional:       General: She is not in acute distress.     Appearance: Normal appearance. She is normal weight. She is not ill-appearing.   HENT:      Head: Normocephalic and atraumatic.      Nose: Nose normal. No congestion or rhinorrhea.      Mouth/Throat:      Mouth: Mucous membranes are moist.      Pharynx: Oropharynx is clear. No oropharyngeal exudate or posterior oropharyngeal erythema.   Eyes:      General: No scleral icterus.     Extraocular Movements: Extraocular movements intact.      Conjunctiva/sclera: Conjunctivae normal.      Pupils: Pupils are  equal, round, and reactive to light.   Neck:      Musculoskeletal: Normal range of motion and neck supple. No neck rigidity or muscular tenderness.   Cardiovascular:      Rate and Rhythm: Normal rate and regular rhythm.      Pulses: Normal pulses.      Heart sounds: No murmur. No gallop.    Pulmonary:      Effort: Pulmonary effort is normal. No respiratory distress.      Breath sounds: Normal breath sounds. No wheezing.   Abdominal:      General: Bowel sounds are normal. There is no distension.      Palpations: Abdomen is soft.      Tenderness: There is no abdominal tenderness. There is no guarding or rebound.   Musculoskeletal: Normal range of motion.         General: Deformity (right second MCP jpint from arthritis, chronic & unchanged,) present. No tenderness.   Skin:     General: Skin is warm and dry.      Capillary Refill: Capillary refill takes less than 2 seconds.      Coloration: Skin is not pale.      Findings: No erythema or rash.   Neurological:      General: No focal deficit present.      Mental Status: She is alert and oriented to person, place, and time. Mental status is at baseline.      Cranial Nerves: No cranial nerve deficit.      Sensory: No sensory deficit.      Motor: No weakness.   Psychiatric:         Mood and Affect: Mood normal.         Behavior: Behavior normal.         Thought Content: Thought content normal.         Judgment: Judgment normal.         Laboratory:  Recent Labs     09/24/20  1800   WBC 4.5*   RBC 4.66   HEMOGLOBIN 14.3   HEMATOCRIT 42.1   MCV 90.3   MCH 30.7   MCHC 34.0   RDW 42.9   PLATELETCT 220   MPV 11.0     Recent Labs     09/24/20  1800   SODIUM 138   POTASSIUM 4.1   CHLORIDE 99   CO2 25   GLUCOSE 100*   BUN 19   CREATININE 0.72   CALCIUM 9.4     Recent Labs     09/24/20  1800   ALTSGPT 15   ASTSGOT 21   ALKPHOSPHAT 83   TBILIRUBIN 0.4   LIPASE 68   GLUCOSE 100*     Recent Labs     09/24/20  1800   APTT 28.9   INR 0.93     No results for input(s): NTPROBNP in the last  72 hours.      No results for input(s): TROPONINT in the last 72 hours.    Imaging:  DX-CHEST-PORTABLE (1 VIEW)   Final Result      Enlarged cardiac silhouette without evidence of acute disease.            Assessment/Plan:  I anticipate this patient is appropriate for observation status at this time.    * Acute lower GI bleeding- (present on admission)  Assessment & Plan  BRBPR on 9/23/2020 about 5-7 episodes small to moderate volume each episode with visible blood clots per patient, no associated dizziness, light headedness, or abdominal cramping, no NSAID use, no history of patient.    Extensive history of diverticular bleed in the past most recently in June 2020 hemoglobin with Hgb of 8.8, colonoscopy performed with no identification of source of bleed.  No h/o ulcer or UGIB.  H/H and vitals stable. Bowel movements have stopped.  She has normal coagulation panel.   GI consulted from ED and will prep the patient tonight for a colonoscopy in the AM, patient is agreeable.  Monitor with CBC q8h, vitals every 4 hours, cardiac monitoring.  Suspecting diverticular bleed but other differentials diagnosis include malignancy, angiodysplasia, colitis.             Hypertension- (present on admission)  Assessment & Plan  Will hold home amlodipine for now given presence of GI bleed.   Will place PRN antihypertensive for SBP>185 or DBP>105.      Diverticulosis- (present on admission)  Assessment & Plan  H/O multiple diverticular bleeds reportedly since she was in her 30's.   No evidence for infection at this time, no changes in bowel habits or abdominal cramping, she is afebrile without leukocytosis.   Dr. Pavon is the patient's outpatient Gastroenterologist.     Gastroesophageal reflux disease without esophagitis- (present on admission)  Assessment & Plan  No abdominal cramping or tenderness.   No h/o ulcers.   Will continue patient's home PPI

## 2020-09-25 NOTE — OR NURSING
1500 Pt arrived from OR with Dr. Concepcion.  Pt VSS, PIV infusing without issue.    1510 Pt denies pain/nausea, just wishing to sleep at this time.   1530 Called and LM for patient daughter.  Pt tolerating sips of tea this time, pain and nausea controlled.   1539 Report to RN on telemetry.    1605 Pt transferred back to Tele on monitor with 2 RN's.

## 2020-09-25 NOTE — PROGRESS NOTES
Pt arrived to unit via cecerrekha with this RN and ACLS RN; zoll in use. Pt oriented to room, unit, and plan of care. Tele-monitor placed and monitor room notified. All questions answered at this time. Call light within reach; fall precautions in place. POC reviewed; all questions answered. Will continue to monitor.

## 2020-09-25 NOTE — PROGRESS NOTES
2 RN skin check complete with Feliciano RN   Devices in place; hearing aides  Skin assessed under devices intact  Confirmed pressure ulcers found on N/A  New potential pressure ulcers noted on N/A. Wound consult placed N/A  The following interventions in place; Pt able to turn self from side to side-encouraged to do so. Moisturizer at bedside. Pillows in use for support/positioning.    Bilat ears- intact  Bilat elbows- dry, intact  Sacrum- intact  Bilat lower extremities- intact- (R) foot red around great toe. Pt states it's from fungal infection she gets frequently. Will continue to monitor.

## 2020-09-25 NOTE — ANESTHESIA POSTPROCEDURE EVALUATION
Patient: Beth Fuller    Procedure Summary     Date: 09/25/20 Room / Location: [facility] ROOM 26 / SURGERY SAME DAY [facility]    Anesthesia Start: 1438 Anesthesia Stop: 1502    Procedure: COLONOSCOPY (N/A Anus) Diagnosis: (POOR PREP, DIVERTICULOSIS)    Surgeon: Dino Pavon M.D. Responsible Provider: Sidra Concepcion M.D.    Anesthesia Type: MAC ASA Status: 2          Final Anesthesia Type: MAC  Last vitals  BP   Blood Pressure : 108/64    Temp   36.4 °C (97.6 °F)    Pulse   Pulse: (!) 58   Resp   16    SpO2   97 %      Anesthesia Post Evaluation    Patient location during evaluation: PACU  Patient participation: complete - patient participated  Level of consciousness: awake and alert  Pain score: 0    Airway patency: patent  Anesthetic complications: no  Cardiovascular status: hemodynamically stable  Respiratory status: acceptable  Hydration status: euvolemic    PONV: none           Nurse Pain Score: 0 (NPRS)

## 2020-09-25 NOTE — DIETARY
"Nutrition services: Day 0 of admit.  Beth Fuller is a 80 y.o. female with admitting DX of GI bleed.  Pt seen for BMI <19.    Assessment:  Height: 165 cm (5' 4.96\")  Weight: 50.6 kg (111 lb 8.8 oz)  Body mass index is 18.59 kg/m²., BMI classification: Normal  Diet/Intake: NPO    Evaluation:   1. Pt NPO today for EGD/Colonoscopy.   2. Pt states her weight is stable and that she has been eating normally.  3. Per chart review, weight has been stable past year. 6/13/20 = 51.1 kg. 10/17/20 = 51.2 kg.  4. No skin breakdown noted per flow sheets.   5. Labs and medications reviewed.    Malnutrition Risk: Criteria not met.    Recommendations/Plan:  1. Diet advancement per MD.   2. Once diet advanced, encourage intake of meals.  3. Document intake of all meals as % taken in ADL's to provide interdisciplinary communication across all shifts.   4. Monitor weight.  5. Nutrition rep will continue to see patient for ongoing meal and snack preferences.             "

## 2020-09-25 NOTE — ANESTHESIA QCDR
2019 [facility] Clinical Data Registry (for Quality Improvement)     Postoperative nausea/vomiting risk protocol (Adult = 18 yrs and Pediatric 3-17 yrs)- (430 and 463)  General inhalation anesthetic (NOT TIVA) with PONV risk factors: Yes  Provision of anti-emetic therapy with at least 2 different classes of agents: Yes   Patient DID NOT receive anti-emetic therapy and reason is documented in Medical Record:  N/A    Multimodal Pain Management- (477)  Non-emergent surgery AND patient age >= 18: Yes  Use of Multimodal Pain Management, two or more drugs and/or interventions, NOT including systemic opioids: No  Exception: Documented allergy to multiple classes of analgesics:        Smoking Abstinence (404)  Patient is current smoker (cigarette, pipe, e-cig, marijuanna): No  Elective Surgery:   Abstinence instructions provided prior to day of surgery:   Patient abstained from smoking on day of surgery:     Pre-Op Beta-Blocker in Isolated CABG (44)  Isolated CABG AND patient age >= 18: No  Beta-blocker admin within 24 hours of surgical incision:   Exception:of medical reason(s) for not administering beta blocker within 24 hours prior to surgical incision (e.g., not  indicated,other medical reason):     PACU assessment of acute postoperative pain prior to Anesthesia Care End- Applies to Patients Age = 18- (ABG7)  Initial PACU pain score is which of the following: < 7/10  Patient unable to report pain score: N/A    Post-anesthetic transfer of care checklist/protocol to PACU/ICU- (426 and 427)  Upon conclusion of case, patient transferred to which of the following locations: PACU/Non-ICU  Use of transfer checklist/protocol: Yes  Exclusion: Service Performed in Patient Hospital Room (and thus did not require transfer): N/A  Unplanned admission to ICU related to anesthesia service up through end of PACU care- (MD51)  Unplanned admission to ICU (not initially anticipated at anesthesia start time): No

## 2020-09-25 NOTE — ED TRIAGE NOTES
Chief Complaint   Patient presents with   • Bloody Stools     BIB EMS from home.  Hx of bright red blood in stools for 2 days.  Hx of same in June 2020.  Has numerous polyps and diverticulosis.  Some lower abd pain cammy on left side.

## 2020-09-26 LAB
ALBUMIN SERPL BCP-MCNC: 3.3 G/DL (ref 3.2–4.9)
ALBUMIN/GLOB SERPL: 1.6 G/DL
ALP SERPL-CCNC: 53 U/L (ref 30–99)
ALT SERPL-CCNC: 12 U/L (ref 2–50)
ANION GAP SERPL CALC-SCNC: 10 MMOL/L (ref 7–16)
AST SERPL-CCNC: 12 U/L (ref 12–45)
BILIRUB SERPL-MCNC: 0.5 MG/DL (ref 0.1–1.5)
BUN SERPL-MCNC: 11 MG/DL (ref 8–22)
CALCIUM SERPL-MCNC: 8.6 MG/DL (ref 8.5–10.5)
CHLORIDE SERPL-SCNC: 103 MMOL/L (ref 96–112)
CO2 SERPL-SCNC: 25 MMOL/L (ref 20–33)
CREAT SERPL-MCNC: 0.73 MG/DL (ref 0.5–1.4)
GLOBULIN SER CALC-MCNC: 2.1 G/DL (ref 1.9–3.5)
GLUCOSE SERPL-MCNC: 99 MG/DL (ref 65–99)
HGB BLD-MCNC: 11.8 G/DL (ref 12–16)
HGB BLD-MCNC: 12.7 G/DL (ref 12–16)
POTASSIUM SERPL-SCNC: 4 MMOL/L (ref 3.6–5.5)
PROT SERPL-MCNC: 5.4 G/DL (ref 6–8.2)
SODIUM SERPL-SCNC: 138 MMOL/L (ref 135–145)

## 2020-09-26 PROCEDURE — 85018 HEMOGLOBIN: CPT | Mod: 91

## 2020-09-26 PROCEDURE — 36415 COLL VENOUS BLD VENIPUNCTURE: CPT

## 2020-09-26 PROCEDURE — 99225 PR SUBSEQUENT OBSERVATION CARE,LEVEL II: CPT | Performed by: STUDENT IN AN ORGANIZED HEALTH CARE EDUCATION/TRAINING PROGRAM

## 2020-09-26 PROCEDURE — A9270 NON-COVERED ITEM OR SERVICE: HCPCS | Performed by: STUDENT IN AN ORGANIZED HEALTH CARE EDUCATION/TRAINING PROGRAM

## 2020-09-26 PROCEDURE — 700102 HCHG RX REV CODE 250 W/ 637 OVERRIDE(OP): Performed by: STUDENT IN AN ORGANIZED HEALTH CARE EDUCATION/TRAINING PROGRAM

## 2020-09-26 PROCEDURE — G0378 HOSPITAL OBSERVATION PER HR: HCPCS

## 2020-09-26 PROCEDURE — 80053 COMPREHEN METABOLIC PANEL: CPT

## 2020-09-26 RX ADMIN — OMEPRAZOLE 20 MG: 20 CAPSULE, DELAYED RELEASE ORAL at 19:54

## 2020-09-26 ASSESSMENT — ENCOUNTER SYMPTOMS
RESPIRATORY NEGATIVE: 1
PSYCHIATRIC NEGATIVE: 1
EYES NEGATIVE: 1
NEUROLOGICAL NEGATIVE: 1
GASTROINTESTINAL NEGATIVE: 1
MUSCULOSKELETAL NEGATIVE: 1
CONSTITUTIONAL NEGATIVE: 1
CARDIOVASCULAR NEGATIVE: 1

## 2020-09-26 ASSESSMENT — FIBROSIS 4 INDEX: FIB4 SCORE: 1.29

## 2020-09-26 NOTE — PROGRESS NOTES
Received report from Carissa PRAJAPATI regarding updates over the past 12 hours. Pt awake sitting up in bed; eating dinner. No complaints of pain at this time. POC reviewed with pt. Pt verbalizes understanding. All questions answered. Call light within reach. Will continue to monitor.

## 2020-09-26 NOTE — DISCHARGE SUMMARY
Discharge Summary    CHIEF COMPLAINT ON ADMISSION  Chief Complaint   Patient presents with   • Bloody Stools     BIB EMS from home.  Hx of bright red blood in stools for 2 days.  Hx of same in June 2020.  Has numerous polyps and diverticulosis.  Some lower abd pain cammy on left side.       Reason for Admission  ems     Admission Date  9/24/2020    CODE STATUS  Full Code    HPI & HOSPITAL COURSE    Arrival to the ED she was hypertensive 162/109, heart rate 87, respirations were 18 saturation 98% on room air and she was generally comfortable.  Initial labs revealed hemoglobin of 14.3 and leukopenia 4.5 which appears to be chronic.  Gastroenterology was consulted by the ED physician and they requested the patient be prepped overnight and they will perform colonoscopy in the morning.  Of note she has been following with Dr. Pavon with gastroenterology since 2017.    Pt underwent EGD/Colonoscopy on 9/25/2020.     Therefore, she is discharged in good and stable condition to home with close outpatient follow-up.    The patient met 2-midnight criteria for an inpatient stay at the time of discharge.    Discharge Date  9/26/2020    FOLLOW UP ITEMS POST DISCHARGE  >Repeat Colonoscopy    DISCHARGE DIAGNOSES  Principal Problem:    Acute lower GI bleeding POA: Yes  Active Problems:    Diverticulosis POA: Yes      Overview: Dr. Cano following    Hypertension POA: Yes    Gastroesophageal reflux disease without esophagitis POA: Yes  Resolved Problems:    * No resolved hospital problems. *      FOLLOW UP  No future appointments.  Jm Manzo M.D.  [address] [id number] [phone]          Dino Pavon M.D.  [address] [address]  [phone]    In 1 week        MEDICATIONS ON DISCHARGE     Medication List      ASK your doctor about these medications      Instructions   amLODIPine 5 MG Tabs  Commonly known as: NORVASC   Doctor's comments: Insurance [employer] 100 day supply required  TAKE  ONE TABLET BY MOUTH ONCE DAILY     estradiol 0.1 MG/GM vaginal cream  Commonly known as: ESTRACE   insert 1 gram vaginally three times per week as directed     omeprazole 20 MG delayed-release capsule  Commonly known as: PRILOSEC   Take 1 Cap by mouth every day.  Dose: 20 mg            Allergies  Allergies   Allergen Reactions   • Ibuprofen      Stomach upset   • Morphine Vomiting   • Other Misc      Metals: zinc oxide, vanadium chloride, manganese chloride       DIET  Orders Placed This Encounter   Procedures   • Diet Order Regular     Standing Status:   Standing     Number of Occurrences:   1     Order Specific Question:   Diet:     Answer:   Regular [1]       ACTIVITY  As tolerated.  Weight bearing as tolerated    CONSULTATIONS  GI    PROCEDURES  egd/colonoscopy    LABORATORY  Lab Results   Component Value Date    SODIUM 138 09/26/2020    POTASSIUM 4.0 09/26/2020    CHLORIDE 103 09/26/2020    CO2 25 09/26/2020    GLUCOSE 99 09/26/2020    BUN 11 09/26/2020    CREATININE 0.73 09/26/2020        Lab Results   Component Value Date    WBC 4.5 (L) 09/24/2020    HEMOGLOBIN 11.8 (L) 09/26/2020    HEMATOCRIT 42.1 09/24/2020    PLATELETCT 220 09/24/2020        Total time of the discharge process exceeds 30 minutes.

## 2020-09-26 NOTE — PROGRESS NOTES
Gastroenterology Progress Note     Author: Dino Pavon M.D.   Date & Time Created: 9/26/2020 3:28 PM    Chief Complaint:  GIB    Interval History:  No further bleeding. Colonoscopy incomplete due to bowel preparation. Extensive diverticulosis.    Review of Systems:  Review of Systems   Constitutional: Negative.    HENT: Negative.    Eyes: Negative.    Respiratory: Negative.    Cardiovascular: Negative.    Gastrointestinal: Negative.    Musculoskeletal: Negative.    Neurological: Negative.    Psychiatric/Behavioral: Negative.        Physical Exam:  Physical Exam  HENT:      Head: Normocephalic.      Nose: Nose normal.   Eyes:      Conjunctiva/sclera: Conjunctivae normal.      Pupils: Pupils are equal, round, and reactive to light.   Cardiovascular:      Rate and Rhythm: Normal rate.   Pulmonary:      Effort: Pulmonary effort is normal.   Abdominal:      General: Abdomen is flat.   Neurological:      General: No focal deficit present.      Mental Status: She is alert.   Psychiatric:         Mood and Affect: Mood normal.         Behavior: Behavior normal.         Thought Content: Thought content normal.         Judgment: Judgment normal.         Labs:          Recent Labs     09/24/20 1800 09/26/20  0732   SODIUM 138 138   POTASSIUM 4.1 4.0   CHLORIDE 99 103   CO2 25 25   BUN 19 11   CREATININE 0.72 0.73   MAGNESIUM 2.0  --    CALCIUM 9.4 8.6     Recent Labs     09/24/20  1800 09/26/20  0732   ALTSGPT 15 12   ASTSGOT 21 12   ALKPHOSPHAT 83 53   TBILIRUBIN 0.4 0.5   LIPASE 68  --    GLUCOSE 100* 99     Recent Labs     09/24/20  1800  09/25/20  2230 09/26/20  0732 09/26/20  1456   RBC 4.66  --   --   --   --    HEMOGLOBIN 14.3   < > 11.7* 11.8* 12.7   HEMATOCRIT 42.1  --   --   --   --    PLATELETCT 220  --   --   --   --    PROTHROMBTM 12.7  --   --   --   --    APTT 28.9  --   --   --   --    INR 0.93  --   --   --   --     < > = values in this interval not displayed.     Recent Labs     09/24/20 1800  20  0732   WBC 4.5*  --    NEUTSPOLYS 70.50  --    LYMPHOCYTES 17.00*  --    MONOCYTES 10.90  --    EOSINOPHILS 0.70  --    BASOPHILS 0.70  --    ASTSGOT 21 12   ALTSGPT 15 12   ALKPHOSPHAT 83 53   TBILIRUBIN 0.4 0.5     Hemodynamics:  Temp (24hrs), Av.9 °C (98.4 °F), Min:36.2 °C (97.2 °F), Max:37.3 °C (99.1 °F)  Temperature: 37.2 °C (98.9 °F)  Pulse  Av.1  Min: 49  Max: 91   Blood Pressure : 121/75     Respiratory:    Respiration: 16, Pulse Oximetry: 99 %           Fluids:    Intake/Output Summary (Last 24 hours) at 2020 1528  Last data filed at 2020 0400  Gross per 24 hour   Intake 360 ml   Output --   Net 360 ml     Weight: 52 kg (114 lb 10.2 oz)  GI/Nutrition:  Orders Placed This Encounter   Procedures   • Diet Order Regular     Standing Status:   Standing     Number of Occurrences:   1     Order Specific Question:   Diet:     Answer:   Regular [1]     Medical Decision Making, by Problem:  Active Hospital Problems    Diagnosis   • Hypertension [I10]   • Diverticulosis [K57.90]   • Gastroesophageal reflux disease without esophagitis [K21.9]       Plan:  GIB: No further bleeding. Colonoscopy incomplete due to bowel preparation. Extensive diverticulosis. Repeat colonoscopy as outpatient discussed with patient. Email sent to [facility] for scheduling.   Diet: Advanced and tolerated.   Dispostition: Patient may be discharged from [facility].       Quality-Core Measures

## 2020-09-26 NOTE — DISCHARGE INSTRUCTIONS
Discharge Instructions    Discharged to home by car with relative. Discharged via wheelchair, hospital escort: Yes.  Special equipment needed: Not Applicable    Be sure to schedule a follow-up appointment with your primary care doctor or any specialists as instructed.     Discharge Plan:   Influenza Vaccine Indication: Patient Refuses    I understand that a diet low in cholesterol, fat, and sodium is recommended for good health. Unless I have been given specific instructions below for another diet, I accept this instruction as my diet prescription.   Other diet: regular    Special Instructions:   Diverticulitis    Diverticulitis is when small pockets in your large intestine (colon) get infected or swollen. This causes stomach pain and watery poop (diarrhea).  These pouches are called diverticula. They form in people who have a condition called diverticulosis.  Follow these instructions at home:  Medicines  · Take over-the-counter and prescription medicines only as told by your doctor. These include:  ? Antibiotics.  ? Pain medicines.  ? Fiber pills.  ? Probiotics.  ? Stool softeners.  · Do not drive or use heavy machinery while taking prescription pain medicine.  · If you were prescribed an antibiotic, take it as told. Do not stop taking it even if you feel better.  General instructions    · Follow a diet as told by your doctor.  · When you feel better, your doctor may tell you to change your diet. You may need to eat a lot of fiber. Fiber makes it easier to poop (have bowel movements). Healthy foods with fiber include:  ? Berries.  ? Beans.  ? Lentils.  ? Green vegetables.  · Exercise 3 or more times a week. Aim for 30 minutes each time. Exercise enough to sweat and make your heart beat faster.  · Keep all follow-up visits as told. This is important. You may need to have an exam of the large intestine. This is called a colonoscopy.  Contact a doctor if:  · Your pain does not get better.  · You have a hard time  eating or drinking.  · You are not pooping like normal.  Get help right away if:  · Your pain gets worse.  · Your problems do not get better.  · Your problems get worse very fast.  · You have a fever.  · You throw up (vomit) more than one time.  · You have poop that is:  ? Bloody.  ? Black.  ? Tarry.  Summary  · Diverticulitis is when small pockets in your large intestine (colon) get infected or swollen.  · Take medicines only as told by your doctor.  · Follow a diet as told by your doctor.  This information is not intended to replace advice given to you by your health care provider. Make sure you discuss any questions you have with your health care provider.  Document Released: 06/05/2009 Document Revised: 11/30/2018 Document Reviewed: 01/04/2018  [employer] Patient Education © 2020 [employer]      · Is patient discharged on Warfarin / Coumadin?   No     Depression / Suicide Risk    As you are discharged from this [facility] facility, it is important to learn how to keep safe from harming yourself.    Recognize the warning signs:  · Abrupt changes in personality, positive or negative- including increase in energy   · Giving away possessions  · Change in eating patterns- significant weight changes-  positive or negative  · Change in sleeping patterns- unable to sleep or sleeping all the time   · Unwillingness or inability to communicate  · Depression  · Unusual sadness, discouragement and loneliness  · Talk of wanting to die  · Neglect of personal appearance   · Rebelliousness- reckless behavior  · Withdrawal from people/activities they love  · Confusion- inability to concentrate     If you or a loved one observes any of these behaviors or has concerns about self-harm, here's what you can do:  · Talk about it- your feelings and reasons for harming yourself  · Remove any means that you might use to hurt yourself (examples: pills, rope, extension cords, firearm)  · Get professional help from the community (Mental  Health, Substance Abuse, psychological counseling)  · Do not be alone:Call your Safe Contact- someone whom you trust who will be there for you.  · Call your local CRISIS HOTLINE 952-9745 or 998-230-6126  · Call your local Children's Mobile Crisis Response Team [place] (902) 471-6458 or www.[employer]  · Call the toll free National Suicide Prevention Hotlines   · National Suicide Prevention Lifeline 375-140-UQZR (6678)  · National Hope Line Network 800-SUICIDE (416-6794)          > Follow up with GI for follow up colonoscopy

## 2020-09-26 NOTE — OP REPORT
DATE OF SERVICE:  09/25/2020    PROCEDURE PERFORMED:  Incomplete colonoscopy due to significantly poor   preparation with marked sigmoid and descending diverticulosis without any   evidence for any active bleeding, new or old blood.  Just solid stool.    PHYSICIAN:  Dino Pavon MD    ANESTHESIOLOGIST:  Sidra Concepcion MD    MEDICATIONS:  Deep sedation.    PREPROCEDURE DIAGNOSIS:  Gastrointestinal bleed.    POSTPROCEDURE DIAGNOSES:  Incomplete colonoscopy, severe sigmoidal   diverticulosis with inability to pass the scope due to obscured images, solid   stool.  However, no evidence for any new or old blood.  No active bleeding.    PROCEDURE:  Patient was placed in the left lateral decubitus position after   sedation was achieved.  Rectal examination revealed palpable stool.  Scope was   advanced into the rectum.  Immediately, a large amount of solid stool was   appreciated.  Scope was advanced to the sigmoid colon.  Immediately, a   significant amount of diverticulosis was appreciated, severe with tics within   tics large mouth diverticulum.  Because of the angulation of the sigmoid colon   and a large amount of solid stool, concern for high risk procedure was   appreciated, and since there was no evidence for any active bleeding, red   blood, old blood, black stools, no bleeding, just stool, risk to benefit ratio   was determined to discontinue the procedure since the patient has a history   of rectal bleeding, history of diverticulosis, most likely diverticular   etiology for her bleeding.    COMPLICATIONS:  None.    BLOOD LOSS:  None.    SPECIMENS:  None.    RECOMMENDATIONS:  Incomplete colonoscopy.  However, no evidence for active   bleeding.  Patient has history consistent with a diverticular bleed and has   significant diverticulosis and does not have any new or old blood in her   colon, so recommendations would be to start a clear liquid diet, and if no   recurrent bleeding, consider discharge.  An email  will be sent for scheduling   for colonoscopy as an outpatient.       ____________________________________     MD GERTRUDE Chiu / EBONIE    DD:  09/25/2020 15:05:48  DT:  09/25/2020 18:35:01    D#:  [id number]  Job#:  [id number]

## 2020-09-26 NOTE — CARE PLAN
Problem: Communication  Goal: The ability to communicate needs accurately and effectively will improve  Outcome: PROGRESSING AS EXPECTED  Note: Pt communicates needs effectively with staff.       Problem: Safety  Goal: Will remain free from falls  Outcome: PROGRESSING AS EXPECTED     Problem: Bowel/Gastric:  Goal: Normal bowel function is maintained or improved  Outcome: PROGRESSING AS EXPECTED  Note: Pt not having bloody bowel movements; feeling better       Problem: Knowledge Deficit  Goal: Knowledge of the prescribed therapeutic regimen will improve  Outcome: PROGRESSING AS EXPECTED  Note: Pt educated regarding plan of care and medications. All questions answered.

## 2020-09-27 VITALS
DIASTOLIC BLOOD PRESSURE: 89 MMHG | TEMPERATURE: 97.9 F | BODY MASS INDEX: 19.03 KG/M2 | OXYGEN SATURATION: 100 % | HEIGHT: 65 IN | SYSTOLIC BLOOD PRESSURE: 150 MMHG | WEIGHT: 114.2 LBS | RESPIRATION RATE: 16 BRPM | HEART RATE: 63 BPM

## 2020-09-27 PROCEDURE — G0378 HOSPITAL OBSERVATION PER HR: HCPCS

## 2020-09-27 PROCEDURE — 99217 PR OBSERVATION CARE DISCHARGE: CPT | Performed by: STUDENT IN AN ORGANIZED HEALTH CARE EDUCATION/TRAINING PROGRAM

## 2020-09-27 NOTE — CARE PLAN
Problem: Infection  Goal: Will remain free from infection  Outcome: PROGRESSING AS EXPECTED  Intervention: Implement standard precautions and perform hand washing before and after patient contact  Note: Hand hygiene performed pre and post assessment     Problem: Venous Thromboembolism (VTW)/Deep Vein Thrombosis (DVT) Prevention:  Goal: Patient will participate in Venous Thrombosis (VTE)/Deep Vein Thrombosis (DVT)Prevention Measures  Outcome: PROGRESSING AS EXPECTED  Intervention: Encourage ambulation/mobilization at level directed by Physical Therapy in collaboration with Interdisciplinary Team  Note: Pt. Independent and ambulates without assistance.

## 2020-09-27 NOTE — PROGRESS NOTES
Pt dc'd Home. IV and monitor removed; monitor room notified. Pt left unit via wheelchair with Pauline JACKSON. Personal belongings with pt when leaving unit. Pt given discharge instructions prior to leaving unit including where to  prescriptions and when to follow-up; verbalizes understanding. Copy of discharge instructions with pt and in the chart.

## 2020-09-27 NOTE — PROGRESS NOTES
[facility] Medicine Daily Progress Note    Date of Service  9/27/2020    Chief Complaint  80 y.o. female with diverticulosis with long history of diverticular bleeds, GERD, hypertension, history of venous thrombosis and embolism in 2013, breast cancer in 2006, presented 9/24/2020 with 1 day history of bright red blood per rectum.     Irina Fuller was in her usual state of health and woke up on the morning of September 24 and had a bowel movement that was full of bright red blood.  Patient reports it filled her toilet bowl.  She is not otherwise feeling any pains or cramping, no recent change in her bowel habits so she went on about her day.  Throughout the day she had multiple episodes of hematochezia, approximately 5-7 episodes each one with bright red blood and some with visible blood clots per the patient.  There is no associated lightheadedness or dizziness or any other associated symptoms.  Due to her history of GI bleeding she came to the ED to be evaluated. BRBPR on 9/23/2020 about 5-7 episodes small to moderate volume each episode with visible blood clots per patient, no associated dizziness, light headedness, or abdominal cramping, no NSAID use, no history of patient. Extensive history of diverticular bleed in the past most recently in June 2020 hemoglobin with Hgb of 8.8, colonoscopy performed with no identification of source of bleed. No h/o ulcer or UGIB. H/H and vitals stable. Bowel movements have stopped. She has normal coagulation panel.      Hospital Course    Arrival to the ED she was hypertensive 162/109, heart rate 87, respirations were 18 saturation 98% on room air and she was generally comfortable.  Initial labs revealed hemoglobin of 14.3 and leukopenia 4.5 which appears to be chronic.  Gastroenterology was consulted by the ED physician and they requested the patient be prepped overnight and they will perform colonoscopy in the morning.  Of note she has been following with Dr. Pavon with  gastroenterology since 2017.    Pt underwent EGD/Colonoscopy on 9/25/2020. There was poor bowel prep but no active bleeding, rediscovered diverticulosis. Patient hemodynamically stable will discharge and have follow up colonoscopyFor EGD/Colonoscopy 9/25/2020, GI on board, likely diagnosis Diverticar bleed vs. Malignancy, angiodysplasia, or colitis.      Interval Problem Update  Monitored hemoglobin for stability as it trended down overnight. Will keep until tomorrow to ensure no bleeding recurs.      Consultants/Specialty  GI    Code Status  Full Code    Disposition  TBD    Review of Systems  ROS     Constitutional: Negative for chills, fever, malaise/fatigue and weight loss.   HENT: Negative for congestion and nosebleeds.    Eyes: Negative for photophobia and pain.   Respiratory: Negative for cough, sputum production and shortness of breath.    Cardiovascular: Negative for chest pain, palpitations and orthopnea.   Gastrointestinal: Negative for abdominal pain or blood in stool. Negative for constipation, nausea and vomiting.   Genitourinary: Negative for dysuria and flank pain.   Musculoskeletal: Positive for joint pain (chronic OA). Negative for neck pain.   Skin: Negative for itching and rash.   Neurological: Negative for dizziness, focal weakness, loss of consciousness, weakness and headaches.   Psychiatric/Behavioral: Negative for hallucinations. The patient is not nervous/anxious    Physical Exam  Temp:  [36.9 °C (98.4 °F)-37.4 °C (99.3 °F)] 36.9 °C (98.4 °F)  Pulse:  [58-69] 61  Resp:  [15-16] 15  BP: (114-132)/(64-76) 114/64  SpO2:  [95 %-99 %] 95 %    Physical Exam    Vitals signs and nursing note reviewed.   Constitutional:       General: She is not in acute distress.     Appearance: Normal appearance. She is normal weight. She is not ill-appearing.   HENT:      Head: Normocephalic and atraumatic.      Nose: Nose normal. No congestion or rhinorrhea.      Mouth/Throat:      Mouth: Mucous membranes are  moist.      Pharynx: Oropharynx is clear. No oropharyngeal exudate or posterior oropharyngeal erythema.   Eyes:      General: No scleral icterus.     Extraocular Movements: Extraocular movements intact.      Conjunctiva/sclera: Conjunctivae normal.      Pupils: Pupils are equal, round, and reactive to light.   Neck:      Musculoskeletal: Normal range of motion and neck supple. No neck rigidity or muscular tenderness.   Cardiovascular:      Rate and Rhythm: Normal rate and regular rhythm.      Pulses: Normal pulses.      Heart sounds: No murmur. No gallop.    Pulmonary:      Effort: Pulmonary effort is normal. No respiratory distress.      Breath sounds: Normal breath sounds. No wheezing.   Abdominal:      General: Bowel sounds are normal. There is no distension.      Palpations: Abdomen is soft.      Tenderness: There is no abdominal tenderness. There is no guarding or rebound.   Musculoskeletal: Normal range of motion.         General: Deformity (right second MCP jpint from arthritis, chronic & unchanged,) present. No tenderness.   Skin:     General: Skin is warm and dry.      Capillary Refill: Capillary refill takes less than 2 seconds.      Coloration: Skin is not pale.      Findings: No erythema or rash.   Neurological:      General: No focal deficit present.      Mental Status: She is alert and oriented to person, place, and time. Mental status is at baseline.      Cranial Nerves: No cranial nerve deficit.      Sensory: No sensory deficit.      Motor: No weakness.   Psychiatric:         Mood and Affect: Mood normal.         Behavior: Behavior normal.         Thought Content: Thought content normal.         Judgment: Judgment normal.     Fluids  No intake or output data in the 24 hours ending 09/27/20 0624    Laboratory  Recent Labs     09/24/20  1800  09/25/20  2230 09/26/20  0732 09/26/20  1456   WBC 4.5*  --   --   --   --    RBC 4.66  --   --   --   --    HEMOGLOBIN 14.3   < > 11.7* 11.8* 12.7   HEMATOCRIT  42.1  --   --   --   --    MCV 90.3  --   --   --   --    MCH 30.7  --   --   --   --    MCHC 34.0  --   --   --   --    RDW 42.9  --   --   --   --    PLATELETCT 220  --   --   --   --    MPV 11.0  --   --   --   --     < > = values in this interval not displayed.     Recent Labs     09/24/20  1800 09/26/20  0732   SODIUM 138 138   POTASSIUM 4.1 4.0   CHLORIDE 99 103   CO2 25 25   GLUCOSE 100* 99   BUN 19 11   CREATININE 0.72 0.73   CALCIUM 9.4 8.6     Recent Labs     09/24/20  1800   APTT 28.9   INR 0.93               Imaging  DX-CHEST-PORTABLE (1 VIEW)   Final Result      Enlarged cardiac silhouette without evidence of acute disease.           Assessment/Plan  Hypertension- (present on admission)  Assessment & Plan  Will hold home amlodipine for now given presence of GI bleed.   Will place PRN antihypertensive for SBP>185 or DBP>105.      Diverticulosis- (present on admission)  Assessment & Plan  H/O multiple diverticular bleeds reportedly since she was in her 30's.   No evidence for infection at this time, no changes in bowel habits or abdominal cramping, she is afebrile without leukocytosis.   Dr. Pavon is the patient's outpatient Gastroenterologist.     Gastroesophageal reflux disease without esophagitis- (present on admission)  Assessment & Plan  No abdominal cramping or tenderness.   No h/o ulcers.   Will continue patient's home PPI       VTE prophylaxis: SCD's, ambulatory.

## 2020-10-16 DIAGNOSIS — I10 ESSENTIAL HYPERTENSION: ICD-10-CM

## 2020-10-19 RX ORDER — AMLODIPINE BESYLATE 5 MG/1
TABLET ORAL
Qty: 100 TAB | Refills: 3 | Status: SHIPPED | OUTPATIENT
Start: 2020-10-19 | End: 2020-10-27 | Stop reason: SDUPTHER

## 2020-10-27 ENCOUNTER — OFFICE VISIT (OUTPATIENT)
Dept: MEDICAL GROUP | Facility: PHYSICIAN GROUP | Age: 80
End: 2020-10-27
Payer: MEDICARE

## 2020-10-27 VITALS
WEIGHT: 116 LBS | HEART RATE: 68 BPM | DIASTOLIC BLOOD PRESSURE: 68 MMHG | TEMPERATURE: 98.1 F | BODY MASS INDEX: 19.33 KG/M2 | OXYGEN SATURATION: 99 % | SYSTOLIC BLOOD PRESSURE: 102 MMHG | HEIGHT: 65 IN

## 2020-10-27 DIAGNOSIS — K21.9 GASTROESOPHAGEAL REFLUX DISEASE WITHOUT ESOPHAGITIS: ICD-10-CM

## 2020-10-27 DIAGNOSIS — K57.90 DIVERTICULOSIS: ICD-10-CM

## 2020-10-27 DIAGNOSIS — M15.9 PRIMARY OSTEOARTHRITIS INVOLVING MULTIPLE JOINTS: ICD-10-CM

## 2020-10-27 DIAGNOSIS — I10 ESSENTIAL HYPERTENSION: ICD-10-CM

## 2020-10-27 DIAGNOSIS — Z23 NEED FOR VACCINATION: ICD-10-CM

## 2020-10-27 PROCEDURE — 99214 OFFICE O/P EST MOD 30 MIN: CPT | Mod: 25 | Performed by: FAMILY MEDICINE

## 2020-10-27 PROCEDURE — G0008 ADMIN INFLUENZA VIRUS VAC: HCPCS | Performed by: FAMILY MEDICINE

## 2020-10-27 PROCEDURE — 90662 IIV NO PRSV INCREASED AG IM: CPT | Performed by: FAMILY MEDICINE

## 2020-10-27 RX ORDER — POLYETHYLENE GLYCOL 3350, SODIUM CHLORIDE, SODIUM BICARBONATE, POTASSIUM CHLORIDE 420; 11.2; 5.72; 1.48 G/4L; G/4L; G/4L; G/4L
POWDER, FOR SOLUTION ORAL
COMMUNITY
Start: 2020-09-29 | End: 2020-10-27

## 2020-10-27 RX ORDER — AMLODIPINE BESYLATE 2.5 MG/1
TABLET ORAL
Qty: 100 TAB | Refills: 3 | Status: SHIPPED | OUTPATIENT
Start: 2020-10-27 | End: 2021-11-29

## 2020-10-27 RX ORDER — CELECOXIB 100 MG/1
100 CAPSULE ORAL 2 TIMES DAILY
Qty: 120 CAP | Refills: 2 | Status: SHIPPED | OUTPATIENT
Start: 2020-10-27 | End: 2021-03-04 | Stop reason: SDUPTHER

## 2020-10-27 ASSESSMENT — FIBROSIS 4 INDEX: FIB4 SCORE: 1.29

## 2020-10-27 NOTE — PROGRESS NOTES
CC:Diagnoses of Primary osteoarthritis involving multiple joints, Essential hypertension, Gastroesophageal reflux disease without esophagitis, Diverticulosis, and Need for vaccination were pertinent to this visit.      HISTORY OF PRESENT ILLNESS: Patient is a 80 y.o. female established patient who presents today to follow-up on hospitalization 1 month ago for diverticulitis.  Patient requesting new prescription of Celebrex.    Gastroesophageal reflux disease without esophagitis  Chronic stable medical condition.  Currently well controlled with omeprazole 20 mg daily.    Diverticulosis  Patient with diverticulitis and lower GI bleed treated medically and currently follows up with gastroenterology.  Patient denies any hematochezia or melena today.  No abdominal pain today.    Hypertension  Chronic improving medical condition.  Currently taking amlodipine 5 mg daily but blood pressure today 110/74 on recheck.  Patient denies any lightheadedness, dizziness, near syncope.    Osteoarthritis  Chronic uncontrolled medical condition.  Patient tried daughter's 200 mg prescription of Celebrex and is requesting prescription for her arthritis.  Patient has intolerance to ibuprofen due to GI upset and states that she did not have any issues with gastric upset while taking her daughter Celebrex.      Patient Active Problem List    Diagnosis Date Noted   • Raynaud's phenomenon (by history or observed) 02/14/2014     Priority: Medium   • Chronic venous insufficiency 02/14/2014     Priority: Medium   • Diverticulosis 12/05/2013     Priority: Medium   • Hypertension 12/05/2013     Priority: Medium   • Gastroesophageal reflux disease without esophagitis 02/06/2018     Priority: Low   • Aortic atherosclerosis (HCC) 11/26/2019   • Cough 11/26/2019   • Ectatic abdominal aorta (HCC) 03/15/2019   • Mouth pain 02/08/2019   • Anxiety 02/08/2019   • Seasonal allergies 11/30/2018   • Arthritis 11/30/2018   • Osteoarthritis 12/07/2013     "  Allergies:Ibuprofen, Morphine, and Other misc    Current Outpatient Medications   Medication Sig Dispense Refill   • celecoxib (CELEBREX) 100 MG Cap Take 1 Cap by mouth 2 times a day. 120 Cap 2   • amLODIPine (NORVASC) 2.5 MG Tab TAKE ONE TABLET BY MOUTH ONCE DAILY 100 Tab 3   • omeprazole (PRILOSEC) 20 MG delayed-release capsule Take 1 Cap by mouth every day. 90 Cap 4   • estradiol (ESTRACE) 0.1 MG/GM vaginal cream insert 1 gram vaginally three times per week as directed 42.5 g 0     No current facility-administered medications for this visit.        Social History     Tobacco Use   • Smoking status: Never Smoker   • Smokeless tobacco: Never Used   Substance Use Topics   • Alcohol use: No   • Drug use: No     Social History     Social History Narrative   • Not on file       Family History   Problem Relation Age of Onset   • Heart Disease Mother    • Hypertension Mother    • Stroke Mother    • Diabetes Mother    • Dementia Mother    • Arthritis Mother    • Diabetes Brother    • Dementia Brother    • Cancer Father         Colon   • Heart Disease Maternal Grandmother    • Kidney Disease Maternal Grandmother    • Heart Attack Maternal Grandfather    • No Known Problems Brother    • Hypertension Brother    • No Known Problems Brother        Review of Systems:    Constitutional: No Fevers, Chills  CV: No Chest pain      All remaining systems reviewed and found to be negative, except as stated above.    Exam:    /68 (BP Location: Right arm, Patient Position: Sitting, BP Cuff Size: Adult)   Pulse 68   Temp 36.7 °C (98.1 °F) (Temporal)   Ht 1.651 m (5' 5\")   Wt 52.6 kg (116 lb)   SpO2 99%  Body mass index is 19.3 kg/m².    General:  Well nourished, well developed female in NAD  HENT: Atraumatic, normocephalic  EYES: Extraocular movements intact, pupils equal and reactive to light  NECK: Supple, FROM  CHEST: No deformities, Equal chest expansion  RESP: Unlabored, no stridor or audible wheeze  ABD: " Non-Distended  Extremities: No Clubbing, Cyanosis, or Edema  Skin: Warm/dry, without rashes  Neuro: A/O x 4, CN 2-12 Grossly intact, Motor/sensory grossly intact  Psych: Normal behavior, normal affect      LABS: 9/24/2020: Results reviewed and discussed with the patient, questions answered.      Assessment/Plan:  1. Primary osteoarthritis involving multiple joints  Chronic uncontrolled medical condition.  Prescription of Celebrex as below.  Follow-up as needed  - celecoxib (CELEBREX) 100 MG Cap; Take 1 Cap by mouth 2 times a day.  Dispense: 120 Cap; Refill: 2    2. Essential hypertension  Chronic improving medical condition.  Decreasing amlodipine to 2.5 mg once daily.  Follow-up blood pressure check in 2 weeks  - amLODIPine (NORVASC) 2.5 MG Tab; TAKE ONE TABLET BY MOUTH ONCE DAILY  Dispense: 100 Tab; Refill: 3    3. Gastroesophageal reflux disease without esophagitis  Chronic stable medical condition.  Continue omeprazole 20 mg daily.    4. Diverticulosis  Chronic improving medical condition.  Continue high-fiber diet and follow-up with gastroenterology.    5. Need for vaccination  - Influenza Vaccine, High Dose (65+ Only)      Return in about 1 year (around 10/27/2021) for Vaccination, AWV.      Please note that this dictation was created using voice recognition software. I have worked with consultants from the vendor as well as technical experts from [facility] to optimize the interface. I have made every reasonable attempt to correct obvious errors, but I expect that there are errors of grammar and possibly content that I did not discover before finalizing the note.

## 2020-10-27 NOTE — ASSESSMENT & PLAN NOTE
Chronic uncontrolled medical condition.  Patient tried daughter's 200 mg prescription of Celebrex and is requesting prescription for her arthritis.  Patient has intolerance to ibuprofen due to GI upset and states that she did not have any issues with gastric upset while taking her daughter Celebrex.

## 2020-10-27 NOTE — ASSESSMENT & PLAN NOTE
Chronic improving medical condition.  Currently taking amlodipine 5 mg daily but blood pressure today 110/74 on recheck.  Patient denies any lightheadedness, dizziness, near syncope.

## 2020-10-27 NOTE — ASSESSMENT & PLAN NOTE
Patient with diverticulitis and lower GI bleed treated medically and currently follows up with gastroenterology.  Patient denies any hematochezia or melena today.  No abdominal pain today.

## 2020-11-21 ENCOUNTER — HOSPITAL ENCOUNTER (OUTPATIENT)
Dept: LAB | Facility: MEDICAL CENTER | Age: 80
End: 2020-11-21
Attending: NURSE PRACTITIONER
Payer: MEDICARE

## 2020-11-21 LAB
BASOPHILS # BLD AUTO: 0.7 % (ref 0–1.8)
BASOPHILS # BLD: 0.03 K/UL (ref 0–0.12)
EOSINOPHIL # BLD AUTO: 0.07 K/UL (ref 0–0.51)
EOSINOPHIL NFR BLD: 1.5 % (ref 0–6.9)
ERYTHROCYTE [DISTWIDTH] IN BLOOD BY AUTOMATED COUNT: 47.1 FL (ref 35.9–50)
HCT VFR BLD AUTO: 37.1 % (ref 37–47)
HGB BLD-MCNC: 12.3 G/DL (ref 12–16)
IMM GRANULOCYTES # BLD AUTO: 0.01 K/UL (ref 0–0.11)
IMM GRANULOCYTES NFR BLD AUTO: 0.2 % (ref 0–0.9)
LYMPHOCYTES # BLD AUTO: 0.5 K/UL (ref 1–4.8)
LYMPHOCYTES NFR BLD: 10.9 % (ref 22–41)
MCH RBC QN AUTO: 30.8 PG (ref 27–33)
MCHC RBC AUTO-ENTMCNC: 33.2 G/DL (ref 33.6–35)
MCV RBC AUTO: 93 FL (ref 81.4–97.8)
MONOCYTES # BLD AUTO: 0.57 K/UL (ref 0–0.85)
MONOCYTES NFR BLD AUTO: 12.5 % (ref 0–13.4)
NEUTROPHILS # BLD AUTO: 3.39 K/UL (ref 2–7.15)
NEUTROPHILS NFR BLD: 74.2 % (ref 44–72)
NRBC # BLD AUTO: 0 K/UL
NRBC BLD-RTO: 0 /100 WBC
PLATELET # BLD AUTO: 257 K/UL (ref 164–446)
PMV BLD AUTO: 11.7 FL (ref 9–12.9)
RBC # BLD AUTO: 3.99 M/UL (ref 4.2–5.4)
WBC # BLD AUTO: 4.6 K/UL (ref 4.8–10.8)

## 2020-11-21 PROCEDURE — 85025 COMPLETE CBC W/AUTO DIFF WBC: CPT

## 2020-11-21 PROCEDURE — 36415 COLL VENOUS BLD VENIPUNCTURE: CPT

## 2020-12-03 ENCOUNTER — TELEPHONE (OUTPATIENT)
Dept: MEDICAL GROUP | Facility: PHYSICIAN GROUP | Age: 80
End: 2020-12-03

## 2020-12-03 DIAGNOSIS — M54.42 ACUTE BACK PAIN WITH SCIATICA, LEFT: ICD-10-CM

## 2020-12-03 DIAGNOSIS — M54.2 CERVICALGIA: ICD-10-CM

## 2020-12-04 NOTE — TELEPHONE ENCOUNTER
1. Caller Name: Beth Fuller                        Call Back Number: [phone] (home)       How would the patient prefer to be contacted with a response:     2. SPECIFIC Action To Be Taken: Referral pending, please sign. Pt wants to restart Therapy. Pt had to cancel previous due to medical problems    3. Diagnosis/Clinical Reason for Request: M54.2 (ICD-10-CM) - Cervicalgia, M54.42,G89.29 (ICD-10-CM) - Chronic bilateral low back pain with left-sided sciatica    4. Specialty & Provider Name/Lab/Imaging Location: [facility] on [employer] Fax Number [phone]    5. Is appointment scheduled for requested order/referral: no    Patient was informed they will receive a return phone call from the office ONLY if there are any questions before processing their request. Advised to call back if they haven't received a call from the referral department in 5 days.

## 2021-01-11 DIAGNOSIS — Z23 NEED FOR VACCINATION: ICD-10-CM

## 2021-03-04 DIAGNOSIS — M15.9 PRIMARY OSTEOARTHRITIS INVOLVING MULTIPLE JOINTS: ICD-10-CM

## 2021-03-05 RX ORDER — CELECOXIB 100 MG/1
100 CAPSULE ORAL 2 TIMES DAILY
Qty: 120 CAPSULE | Refills: 0 | Status: SHIPPED | OUTPATIENT
Start: 2021-03-05 | End: 2021-08-04 | Stop reason: SDUPTHER

## 2021-03-05 NOTE — TELEPHONE ENCOUNTER
Requested Prescriptions     Signed Prescriptions Disp Refills   • celecoxib (CELEBREX) 100 MG Cap 120 capsule 0     Sig: Take 1 capsule by mouth 2 times a day.     Authorizing Provider: RAMIRO DIA A.P.R.N.

## 2021-03-05 NOTE — TELEPHONE ENCOUNTER
Received request via: Pharmacy    Was the patient seen in the last year in this department? Yes on 10/27/2020    Does the patient have an active prescription (recently filled or refills available) for medication(s) requested? No

## 2021-04-14 NOTE — PROGRESS NOTES
CC:Diagnoses of Essential hypertension, Arthritis, Primary osteoarthritis involving multiple joints, Cervicalgia, Chronic bilateral low back pain with left-sided sciatica, and Aortic atherosclerosis (HCC) were pertinent to this visit.      HISTORY OF PRESENT ILLNESS: Patient is a 80 y.o. female established patient who presents today to discuss multiple medical issues.    Patient concerned about ongoing lumbago and cervicalgia that has been chronic in nature but seems to be worsening with associated muscle tightness.  Patient is concerned about compression fractures and arthritis leading to loss of height and contributing to ongoing muscle aches and pains.  Patient denies any radiation of pain from her neck or back.  Pain is dull and achy in nature that is 5/10 in severity.  Patient denies any fevers associated with back pain, bowel or bladder habit changes, areas of anesthesia including the saddle area.    Chronic stable hypertension currently well controlled with amlodipine.  Patient denies any headaches or vision changes.    Patient also concerned about ongoing arthritis of the hands that improves with Voltaren gel.  Patient states that she gets dull aches in her wrist that improved with Voltaren once daily as needed and notes that she recently had a ganglion cyst removed from her right wrist with Dr. Gonzalez.  Currently asymptomatic for any pain related to her ganglion cyst and surgery.    Aortic atherosclerosis (HCC)  Chronic ongoing medical condition.  Atherosclerotic plaque again noted on CT January 2020.  Patient not currently taking cholesterol-lowering medication at this time.      Patient Active Problem List    Diagnosis Date Noted   • Raynaud's phenomenon (by history or observed) 02/14/2014     Priority: Medium   • Chronic venous insufficiency 02/14/2014     Priority: Medium   • Hypertension 12/05/2013     Priority: Medium   • Aortic atherosclerosis (HCC) 11/26/2019   • Cough 11/26/2019   • Ectatic  abdominal aorta (HCC) 03/15/2019   • Mouth pain 02/08/2019   • Anxiety 02/08/2019   • Seasonal allergies 11/30/2018   • Arthritis 11/30/2018   • Gastroesophageal reflux disease without esophagitis 02/06/2018   • Osteoarthritis 12/07/2013   • Diverticulosis 12/05/2013      Allergies:Ibuprofen; Morphine; and Other misc    Current Outpatient Medications   Medication Sig Dispense Refill   • omeprazole (PRILOSEC) 20 MG delayed-release capsule Take 1 Cap by mouth every day. 30 Cap 0   • amLODIPine (NORVASC) 5 MG Tab TAKE ONE TABLET BY MOUTH ONCE DAILY 100 Tab 0   • estradiol (ESTRACE) 0.1 MG/GM vaginal cream insert 1 gram vaginally three times per week as directed 42.5 g 0     No current facility-administered medications for this visit.        Social History     Tobacco Use   • Smoking status: Never Smoker   • Smokeless tobacco: Never Used   Substance Use Topics   • Alcohol use: No   • Drug use: No     Social History     Social History Narrative   • Not on file       Family History   Problem Relation Age of Onset   • Heart Disease Mother    • Hypertension Mother    • Stroke Mother    • Diabetes Mother    • Dementia Mother    • Arthritis Mother    • Diabetes Brother    • Dementia Brother    • Cancer Father         Colon   • Heart Disease Maternal Grandmother    • Kidney Disease Maternal Grandmother    • Heart Attack Maternal Grandfather    • No Known Problems Brother    • Hypertension Brother    • No Known Problems Brother        Review of Systems:    Constitutional: No Fevers, Chills  Eyes: No vision changes  ENT: No hearing changes  Resp: No Shortness of breath  CV: No Chest pain  GI: No Nausea/Vomiting  MSK: No weakness  Skin: No rashes  Neuro: No Headaches  Psych: No Suicidal ideations    All remaining systems reviewed and found to be negative, except as stated above.    Exam:    /80 (BP Location: Right arm, Patient Position: Sitting, BP Cuff Size: Adult)   Pulse 75   Temp 37.3 °C (99.1 °F) (Temporal)   Resp  "14   Ht 1.651 m (5' 5\")   Wt 53.1 kg (117 lb)   SpO2 96%  Body mass index is 19.47 kg/m².    General:  Well nourished, well developed female in NAD, thin  HENT: Atraumatic, normocephalic  EYES: Extraocular movements intact, pupils equal and reactive to light  NECK: Supple, FROM  CHEST: No deformities, Equal chest expansion  RESP: Unlabored, no stridor or audible wheeze  ABD: Soft, Nontender, Non-Distended  Extremities: No Clubbing, Cyanosis.  Bilateral lower extremity noted.  Unable to quantify due to compression stockings and patient unwilling to remove them at this time.  Skin: Warm/dry, without rashes  Neuro: A/O x 4, CN 2-12 Grossly intact, Motor/sensory grossly intact  Psych: Normal behavior, normal affect      LABS: 6/30/2020: Results reviewed and discussed with the patient, questions answered.      Assessment/Plan:  1. Essential hypertension  Chronic stable medical condition.  Continue amlodipine 5 mg once daily.    2. Arthritis  Chronic ongoing medical condition.  Continue Voltaren cream.    3. Primary osteoarthritis involving multiple joints  4. Cervicalgia  New problem to examiner.  X-rays as below.  Follow-up pending x-rays  - DX-CERVICAL SPINE-2 OR 3 VIEWS; Future    5. Chronic bilateral low back pain with left-sided sciatica  New problem to examiner.  X-rays as below.  Follow-up pending x-ray results.  - DX-LUMBAR SPINE-2 OR 3 VIEWS; Future    6. Aortic atherosclerosis (HCC)  Chronic ongoing medical condition.  Patient declines any additional medications at this time.    Follow-up pending x-rays    Please note that this dictation was created using voice recognition software. I have worked with consultants from the vendor as well as technical experts from [facility] [employer] to optimize the interface. I have made every reasonable attempt to correct obvious errors, but I expect that there are errors of grammar and possibly content that I did not discover before finalizing the note.  " LWBS

## 2021-05-10 DIAGNOSIS — J30.2 SEASONAL ALLERGIES: ICD-10-CM

## 2021-05-11 RX ORDER — FLUTICASONE PROPIONATE 50 MCG
SPRAY, SUSPENSION (ML) NASAL
Qty: 16 G | Refills: 0 | Status: SHIPPED | OUTPATIENT
Start: 2021-05-11 | End: 2021-11-24

## 2021-05-11 NOTE — TELEPHONE ENCOUNTER
Received request via: Pharmacy    Was the patient seen in the last year in this department? Yes 10/27/20    Does the patient have an active prescription (recently filled or refills available) for medication(s) requested? No

## 2021-05-18 ENCOUNTER — PATIENT OUTREACH (OUTPATIENT)
Dept: HEALTH INFORMATION MANAGEMENT | Facility: OTHER | Age: 81
End: 2021-05-18

## 2021-05-18 NOTE — PROGRESS NOTES
Outcome:HIPAA verified - Member declined scheduling assessment does not feel it would benefit her as she see's her PCP routinely. Introduced myself to member as her SCPPA, and provided my direct line P: [phone].    Attempt # 1

## 2021-08-04 DIAGNOSIS — M15.9 PRIMARY OSTEOARTHRITIS INVOLVING MULTIPLE JOINTS: ICD-10-CM

## 2021-08-04 RX ORDER — CELECOXIB 100 MG/1
100 CAPSULE ORAL 2 TIMES DAILY
Qty: 120 CAPSULE | Refills: 0 | Status: SHIPPED | OUTPATIENT
Start: 2021-08-04 | End: 2021-09-07 | Stop reason: SDUPTHER

## 2021-08-04 NOTE — TELEPHONE ENCOUNTER
Received request via: Pharmacy    Was the patient seen in the last year in this department? Yes LOV 10/27/2020    Does the patient have an active prescription (recently filled or refills available) for medication(s) requested? No

## 2021-08-25 DIAGNOSIS — K21.9 GASTROESOPHAGEAL REFLUX DISEASE WITHOUT ESOPHAGITIS: ICD-10-CM

## 2021-08-26 RX ORDER — OMEPRAZOLE 20 MG/1
CAPSULE, DELAYED RELEASE ORAL
Qty: 90 CAPSULE | Refills: 4 | Status: SHIPPED | OUTPATIENT
Start: 2021-08-26 | End: 2022-09-26 | Stop reason: SDUPTHER

## 2021-09-07 DIAGNOSIS — M15.9 PRIMARY OSTEOARTHRITIS INVOLVING MULTIPLE JOINTS: ICD-10-CM

## 2021-09-07 RX ORDER — CELECOXIB 100 MG/1
100 CAPSULE ORAL 2 TIMES DAILY
Qty: 120 CAPSULE | Refills: 0 | Status: SHIPPED | OUTPATIENT
Start: 2021-09-07 | End: 2021-11-09

## 2021-09-07 NOTE — TELEPHONE ENCOUNTER
Received request via: Patient    Was the patient seen in the last year in this department? No . Last seen on 10/27/2020    Does the patient have an active prescription (recently filled or refills available) for medication(s) requested? No

## 2021-10-27 ENCOUNTER — OFFICE VISIT (OUTPATIENT)
Dept: MEDICAL GROUP | Facility: PHYSICIAN GROUP | Age: 81
End: 2021-10-27
Payer: MEDICARE

## 2021-10-27 VITALS
DIASTOLIC BLOOD PRESSURE: 78 MMHG | HEIGHT: 65 IN | BODY MASS INDEX: 19.49 KG/M2 | HEART RATE: 103 BPM | SYSTOLIC BLOOD PRESSURE: 144 MMHG | TEMPERATURE: 98.7 F | OXYGEN SATURATION: 97 % | WEIGHT: 117 LBS

## 2021-10-27 DIAGNOSIS — I45.9 HEART BLOCK: ICD-10-CM

## 2021-10-27 DIAGNOSIS — K21.9 GASTROESOPHAGEAL REFLUX DISEASE WITHOUT ESOPHAGITIS: ICD-10-CM

## 2021-10-27 DIAGNOSIS — I10 PRIMARY HYPERTENSION: ICD-10-CM

## 2021-10-27 DIAGNOSIS — I49.9 CARDIAC ARRHYTHMIA, UNSPECIFIED CARDIAC ARRHYTHMIA TYPE: ICD-10-CM

## 2021-10-27 DIAGNOSIS — I77.811 ABDOMINAL AORTIC ECTASIA (HCC): ICD-10-CM

## 2021-10-27 DIAGNOSIS — I70.0 ATHEROSCLEROSIS OF AORTA (HCC): ICD-10-CM

## 2021-10-27 DIAGNOSIS — Z78.0 POSTMENOPAUSAL: ICD-10-CM

## 2021-10-27 DIAGNOSIS — F41.9 ANXIETY: ICD-10-CM

## 2021-10-27 DIAGNOSIS — I73.00 RAYNAUD'S PHENOMENON WITHOUT GANGRENE: ICD-10-CM

## 2021-10-27 DIAGNOSIS — M15.9 PRIMARY OSTEOARTHRITIS INVOLVING MULTIPLE JOINTS: ICD-10-CM

## 2021-10-27 PROCEDURE — 93000 ELECTROCARDIOGRAM COMPLETE: CPT | Performed by: FAMILY MEDICINE

## 2021-10-27 PROCEDURE — G0439 PPPS, SUBSEQ VISIT: HCPCS | Performed by: FAMILY MEDICINE

## 2021-10-27 ASSESSMENT — ACTIVITIES OF DAILY LIVING (ADL): BATHING_REQUIRES_ASSISTANCE: 0

## 2021-10-27 ASSESSMENT — PATIENT HEALTH QUESTIONNAIRE - PHQ9: CLINICAL INTERPRETATION OF PHQ2 SCORE: 0

## 2021-10-27 ASSESSMENT — FIBROSIS 4 INDEX: FIB4 SCORE: 1.12

## 2021-10-27 ASSESSMENT — ENCOUNTER SYMPTOMS: GENERAL WELL-BEING: GOOD

## 2021-10-27 NOTE — PROGRESS NOTES
Chief Complaint   Patient presents with   • Annual Exam     AHA   • Hypertension     High reading         HPI:  Irina is a 81 y.o. here for Medicare Annual Wellness Visit    Patient is here for her annual exam     Patient Active Problem List    Diagnosis Date Noted   • Aortic atherosclerosis (HCC) 11/26/2019   • Cough 11/26/2019   • Ectatic abdominal aorta (HCC) 03/15/2019   • Mouth pain 02/08/2019   • Anxiety 02/08/2019   • Seasonal allergies 11/30/2018   • Arthritis 11/30/2018   • Gastroesophageal reflux disease without esophagitis 02/06/2018   • Raynaud's phenomenon (by history or observed) 02/14/2014   • Chronic venous insufficiency 02/14/2014   • Osteoarthritis 12/07/2013   • Diverticulosis 12/05/2013   • Hypertension 12/05/2013       Current Outpatient Medications   Medication Sig Dispense Refill   • celecoxib (CELEBREX) 100 MG Cap Take 1 Capsule by mouth 2 times a day. 120 Capsule 0   • omeprazole (PRILOSEC) 20 MG delayed-release capsule TAKE 1 CAPSULE BY MOUTH EVERY DAY 90 Capsule 4   • fluticasone (FLONASE) 50 MCG/ACT nasal spray USE 1 SPRAY IN EACH NOSTRIL ONCE DAILY AS DIRECTED. 16 g 0   • amLODIPine (NORVASC) 2.5 MG Tab TAKE ONE TABLET BY MOUTH ONCE DAILY 100 Tab 3   • estradiol (ESTRACE) 0.1 MG/GM vaginal cream insert 1 gram vaginally three times per week as directed 42.5 g 0     No current facility-administered medications for this visit.        Patient is taking medications as noted in medication list.  Current supplements as per medication list.     Allergies: Ibuprofen, Morphine, and Other misc    Current social contact/activities: Patient is socially active with her family    Is patient current with immunizations? No, due for FLU, PREVNAR (PCV13)  and SHINGRIX (Shingles). Patient is interested in receiving NONE today.    She  reports that she has never smoked. She has never used smokeless tobacco. She reports that she does not drink alcohol and does not use drugs.  Counseling given: Not  Answered        DPA/Advanced directive: Patient has Living Will, but it is not on file. Instructed to bring in a copy to scan into their chart.    ROS:    Gait: Uses no asstive device   Ostomy: No   Other tubes: No   Amputations: No   Chronic oxygen use No   Last eye exam 9/2021   Wears hearing aids: Yes   : Denies any urinary leakage during the last 6 months      Screening:    Depression Screening    Little interest or pleasure in doing things?  0 - not at all  Feeling down, depressed, or hopeless? 0 - not at all  Patient Health Questionnaire Score: 0    If depressive symptoms identified deferred to follow up visit unless specifically addressed in assessment and plan.    Interpretation of PHQ-9 Total Score   Score Severity   1-4 No Depression   5-9 Mild Depression   10-14 Moderate Depression   15-19 Moderately Severe Depression   20-27 Severe Depression    Screening for Cognitive Impairment    Three Minute Recall (captain, garden, picture)  2/3    Ming clock face with all 12 numbers and set the hands to show 5 past 8.  Yes    If cognitive concerns identified, deferred for follow up unless specifically addressed in assessment and plan.    Fall Risk Assessment    Has the patient had two or more falls in the last year or any fall with injury in the last year?  No  If fall risk identified, deferred for follow up unless specifically addressed in assessment and plan.    Safety Assessment    Throw rugs on floor.  Yes  Handrails on all stairs.  No  Good lighting in all hallways.  No  Difficulty hearing.  Yes  Patient counseled about all safety risks that were identified.    Functional Assessment ADLs    Are there any barriers preventing you from cooking for yourself or meeting nutritional needs?  No.    Are there any barriers preventing you from driving safely or obtaining transportation?  No.    Are there any barriers preventing you from using a telephone or calling for help?  No.    Are there any barriers preventing  you from shopping?  No.    Are there any barriers preventing you from taking care of your own finances?  No.    Are there any barriers preventing you from managing your medications?  No.    Are there any barriers preventing you from showering, bathing or dressing yourself?  No.    Are you currently engaging in any exercise or physical activity?  Yes.     What is your perception of your health?  Good.    Health Maintenance Summary                COVID-19 Vaccine Overdue 4/3/1952     IMM ZOSTER VACCINES Overdue 4/3/1988     IMM PNEUMOCOCCAL VACCINE: 65+ Years Overdue 4/3/2003     Annual Wellness Visit Overdue 5/5/2019      Done 5/4/2018 Visit Dx: Medicare annual wellness visit, subsequent    BONE DENSITY Overdue 10/8/2019      Done 10/8/2014 DS-BONE DENSITY STUDY (DEXA)    IMM INFLUENZA Overdue 9/1/2021      Done 10/27/2020 Imm Admin: Influenza Vaccine Adult HD     Patient has more history with this topic...    COLORECTAL CANCER SCREENING Next Due 10/2/2023           Patient Care Team:  Jm Manzo M.D. as PCP - General (Family Medicine)  Dino Pavon M.D. as Consulting Physician (Gastroenterology)  Lily Melo D.P.M. as Consulting Physician (Podiatry)  Og Lima M.D. (Orthopedic Surgery)  Sander Ríos Ass[facility] as [facility]     Social History     Tobacco Use   • Smoking status: Never Smoker   • Smokeless tobacco: Never Used   Substance Use Topics   • Alcohol use: No   • Drug use: No     Family History   Problem Relation Age of Onset   • Heart Disease Mother    • Hypertension Mother    • Stroke Mother    • Diabetes Mother    • Dementia Mother    • Arthritis Mother    • Diabetes Brother    • Dementia Brother    • Cancer Father         Colon   • Heart Disease Maternal Grandmother    • Kidney Disease Maternal Grandmother    • Heart Attack Maternal Grandfather    • No Known Problems Brother    • Hypertension Brother    • No Known Problems Brother      She  has a past  medical history of Anesthesia (1974/2002), Arthritis, Breast cancer (HCC), Cancer (HCC) (2006), CATARACT, Dental disorder, Diverticulitis, GERD (gastroesophageal reflux disease), Heart burn (12/17/13), Hypertension, Indigestion, MEDICAL HOME (6/9/2015), Osteoporosis, Pain, Personal history of venous thrombosis and embolism (12/31/2013), Pneumonia (1984), PVD (peripheral vascular disease) ([facility]), and Unspecified hemorrhagic conditions.   Past Surgical History:   Procedure Laterality Date   • PB COLONOSCOPY,DIAGNOSTIC N/A 9/25/2020    Procedure: COLONOSCOPY;  Surgeon: Dino Pavon M.D.;  Location: SURGERY SAME DAY [facility];  Service: Gastroenterology   • COLONOSCOPY WITH POLYP N/A 6/13/2020    Procedure: COLONOSCOPY, WITH POLYPECTOMY;  Surgeon: Sam Soliman D.O.;  Location: SURGERY [facility];  Service: Gastroenterology   • GANGLION EXCISION Right 10/17/2019    Procedure: EXCISION, GANGLION CYST- WRIST;  Surgeon: Og Lima M.D.;  Location: SURGERY [facility];  Service: Orthopedics   • KNEE ARTHROPLASTY TOTAL Right 6/8/2015    Procedure: KNEE ARTHROPLASTY TOTAL;  Surgeon: Markus York M.D.;  Location: [facility];  Service:    • GASTROSCOPY WITH BIOPSY  6/16/2014    Performed by Dino Pavon M.D. at [facility]   • EGD W/ENDOSCOPIC ULTRASOUND  6/16/2014    Performed by Dino Pavon M.D. at [facility]   • KNEE ARTHROPLASTY TOTAL  12/30/2013    left; Performed by Markus York M.D. at [facility]   • COLONOSCOPY - ENDO  12/6/2013    Performed by Markus Juarez D.O. at ENDOSCOPY [facility]   • LUMPECTOMY  2006    right breast   • PB RADIATION THERAPY PLAN SIMPLE  2006   • CATARACT EXTRACTION WITH IOL  2003    bilateral   • APPENDECTOMY  1974   • HYSTERECTOMY, TOTAL ABDOMINAL  1974                           • TONSILLECTOMY AND ADENOIDECTOMY  as child   • VEIN STRIPPING  1974/1999/2013    b/l legs           Exam:     /78    "Pulse (!) 103   Temp 37.1 °C (98.7 °F) (Temporal)   Ht 1.651 m (5' 5\")   Wt 53.1 kg (117 lb)   SpO2 97%  Body mass index is 19.47 kg/m².    Hearing good.    Dentition good  Alert, oriented in no acute distress.  Eye contact is good, speech goal directed, affect calm  GENERAL: No acute distress  HENT: Atraumatic, normocephalic  EYES: Extraocular movements intact, pupils equal and reactive to light  NECK: Supple, FROM  CHEST: No deformities, Equal chest expansion, irregular rhythm noted on exam.  No murmurs, gallops, rubs.  RESP: Unlabored, no stridor or audible wheeze  ABD: Non-Distended  Extremities: No Clubbing, Cyanosis, or Edema  Skin: Warm/dry, without rashes  Neuro: A/O x 4, CN 2-12 Grossly intact, Motor/sensory grossly intact  Psych: Normal behavior, normal affect    EKG Interpretation   EKG ordered, reviewed, and interpreted by Jm Manzo MD  Rhythm: normal sinus premature atrial complexes and occasional dropped beats  Rate: 61 bpm  Axis: Left axis deviation  Ectopy: none   Conduction: Nonspecific intraventricular conduction delay with left ventricular hypertrophy.  ST Segments: no acute change   T Waves: no acute change   Q Waves: none   Clinical Impression: First-degree heart block with occasional dropped beats concerning for possible type II block with LVH, LAD, IVCD.  These results were discussed with the patient      Assessment and Plan. The following treatment and monitoring plan is recommended:      1. Primary hypertension   chronic stable medical condition.  Continue amlodipine 2.5 mg daily   2. Aortic atherosclerosis (HCC)   noted no interventions at this time.   3. Ectatic abdominal aorta (HCC)   no interventions at this time.   4. Gastroesophageal reflux disease without esophagitis   continue omeprazole   5. Raynaud's phenomenon without gangrene   continue lifestyle modification.   6. Primary osteoarthritis involving multiple joints   continue Celebrex as needed.   7. Cardiac arrhythmia, " unspecified cardiac arrhythmia type  EKG    REFERRAL TO CARDIOLOGY   8. Anxiety   no interventions at this time   9. Heart block  REFERRAL TO CARDIOLOGY   10. Postmenopausal  DS-BONE DENSITY STUDY (DEXA)         Services suggested: No services needed at this time  Health Care Screening recommendations as per orders if indicated.  Referrals offered: PT/OT/Nutrition counseling/Behavioral Health/Smoking cessation as per orders if indicated.    Discussion today about general wellness and lifestyle habits:    · Prevent falls and reduce trip hazards; Cautioned about securing or removing rugs.  · Have a working fire alarm and carbon monoxide detector;   · Engage in regular physical activity and social activities       Follow-up: No follow-ups on file.

## 2021-11-03 NOTE — ANESTHESIA TIME REPORT
Anesthesia Start and Stop Event Times     Date Time Event    10/17/2019 0706 Ready for Procedure     0736 Anesthesia Start     0809 Anesthesia Stop        Responsible Staff  10/17/19    Name Role Begin End    Nikos Bunn M.D. Anesth 0736 [id number]        Preop Diagnosis (Free Text):  Pre-op Diagnosis     pain in right wrist        Preop Diagnosis (Codes):    Post op Diagnosis  Mass of left wrist      Premium Reason  Non-Premium    Comments:                                                                        no

## 2021-11-07 DIAGNOSIS — M15.9 PRIMARY OSTEOARTHRITIS INVOLVING MULTIPLE JOINTS: ICD-10-CM

## 2021-11-09 RX ORDER — CELECOXIB 100 MG/1
CAPSULE ORAL
Qty: 120 CAPSULE | Refills: 0 | Status: SHIPPED | OUTPATIENT
Start: 2021-11-09 | End: 2022-04-04 | Stop reason: SDUPTHER

## 2021-11-15 ENCOUNTER — TELEPHONE (OUTPATIENT)
Dept: CARDIOLOGY | Facility: MEDICAL CENTER | Age: 81
End: 2021-11-15

## 2021-11-15 NOTE — TELEPHONE ENCOUNTER
Chart Prep    Called patient in regards to records for NP appointment with LS on 11/24/2021 at 10:45am to review most recent lab results, ekg, any cardiac testing or ,if she has been treated by a cardiologist. No answer, left voicemail to call back.

## 2021-11-23 NOTE — PROGRESS NOTES
Subjective:   Chief Complaint:   Chief Complaint   Patient presents with   • Hypertension   • Arrhythmia     NP Dx: Cardiac arrhythmia, unspecified cardiac arrhythmia type       Beth Fuller is a 81 y.o. female who is referred by Dr. Jm Manzo for abnormal ECG, PACs, possible LVH on echo, HTN.    Saw PCP, noted lots of PACs on exam.  ECG in October 2021 was sinus, first-degree AV delay, nonspecific intraventricular conduction delay, left intravesicular block, PACs.    Remote echocardiogram in 2014 with mild LVH.    Has hypertension, BP up a bit.    Has chronic venous insufficiency.    Prior breast CA, radiation to mid chest, partial mastectomy, no chemo.    Needs to stay hydrated to avoid lightheadedness.    Prior provoked DVT after knee surgery.  Tells me prone to blood clots, happened during childbirth.    No prior hyperlipidemia. LDL 36.  No family history of premature coronary artery disease.  Mother had CVA at 78 and 80. No known afib.  No prior smoking history.  No history of diabetes.  No history of autoimmune disease such as lupus or rheumatoid arthritis.  No chronic kidney disease. Did have kidney problem as child that required shots, had swelling.  No ETOH overuse.  No caffeine overuse.  No recreation substance use.  No hx asthma.    Active, goes to the gym.  Not limited by chest pain, pressure or tightness with activity.   No significant dyspnea on exertion, orthopnea or lower extremity swelling.   No significant palpitations, lightheadedness, or presyncope/syncope.   No symptoms of leg claudication.   No stroke/TIA like symptoms.    Here with her daughter Annemarie.    DATA REVIEWED by me:  ECG (my personal interpretation) 10/27/2021  Sinus, 61, PAC, first-degree AV delay, nonspecific intraventricular conduction delay, left anterior fascicular block    ECG 6/11/2020  Sinus, 69, first-degree AV delay, nonspecific intraventricular conduction delay, left anterior fascicular block    Echo  2014  Normal left ventricular systolic function.   Mild concentric left ventricular hypertrophy.   Grade I diastolic dysfunction is present.   Enlarged right ventricular size.   Mitral annular calcification.   Trace mitral regurgitation.   Aortic sclerosis without stenosis.   At least mild aortic insufficiency.   Mild tricuspid regurgitation.   Trace pulmonic insufficiency.     Most recent labs:       Lab Results   Component Value Date/Time    WBC 4.6 (L) 11/21/2020 09:40 AM    HEMOGLOBIN 12.3 11/21/2020 09:40 AM    HEMATOCRIT 37.1 11/21/2020 09:40 AM    MCV 93.0 11/21/2020 09:40 AM    INR 0.93 09/24/2020 06:00 PM      Lab Results   Component Value Date/Time    SODIUM 138 09/26/2020 07:32 AM    POTASSIUM 4.0 09/26/2020 07:32 AM    CHLORIDE 103 09/26/2020 07:32 AM    CO2 25 09/26/2020 07:32 AM    GLUCOSE 99 09/26/2020 07:32 AM    BUN 11 09/26/2020 07:32 AM    CREATININE 0.73 09/26/2020 07:32 AM      Lab Results   Component Value Date/Time    ASTSGOT 12 09/26/2020 07:32 AM    ALTSGPT 12 09/26/2020 07:32 AM    ALBUMIN 3.3 09/26/2020 07:32 AM      Lab Results   Component Value Date/Time    CHOLSTRLTOT 131 06/13/2020 02:09 AM    LDL 36 06/13/2020 02:09 AM    HDL 78 06/13/2020 02:09 AM    TRIGLYCERIDE 86 06/13/2020 02:09 AM     No results for input(s): NTPROBNP, TROPONINT in the last 72 hours.      Past Medical History:   Diagnosis Date   • Anesthesia 1974/2002    PONV   • Arthritis    • Breast cancer (HCC)    • Cancer (HCC) 2006    right breast    • CATARACT     surgical correction bilateral   • Dental disorder     bridge upper front; 5/7/2015 dental extraction with infection; temporary right lower   • Diverticulitis    • GERD (gastroesophageal reflux disease)    • Heart burn 12/17/13   • Hypertension    • Indigestion    • MEDICAL HOME 6/9/2015   • Osteoporosis    • Pain     chronic pain knees; ankles, feet, right shoulder, arm and hand   • Personal history of venous thrombosis and embolism 12/31/2013    leg   •  Pneumonia 1984   • PVD (peripheral vascular disease) (HCC)    • Unspecified hemorrhagic conditions     had GI bleed diverticulitis     Past Surgical History:   Procedure Laterality Date   • PB COLONOSCOPY,DIAGNOSTIC N/A 9/25/2020    Procedure: COLONOSCOPY;  Surgeon: Dino Pavon M.D.;  Location: SURGERY SAME DAY [facility];  Service: Gastroenterology   • COLONOSCOPY WITH POLYP N/A 6/13/2020    Procedure: COLONOSCOPY, WITH POLYPECTOMY;  Surgeon: Sam Soliman D.O.;  Location: SURGERY [facility];  Service: Gastroenterology   • GANGLION EXCISION Right 10/17/2019    Procedure: EXCISION, GANGLION CYST- WRIST;  Surgeon: Og Lima M.D.;  Location: SURGERY [facility];  Service: Orthopedics   • KNEE ARTHROPLASTY TOTAL Right 6/8/2015    Procedure: KNEE ARTHROPLASTY TOTAL;  Surgeon: Markus York M.D.;  Location: SURGERY [facility];  Service:    • GASTROSCOPY WITH BIOPSY  6/16/2014    Performed by Dino Pavon M.D. at [facility]   • EGD W/ENDOSCOPIC ULTRASOUND  6/16/2014    Performed by Dino Pavon M.D. at [facility]   • KNEE ARTHROPLASTY TOTAL  12/30/2013    left; Performed by Markus York M.D. at [facility]   • COLONOSCOPY - ENDO  12/6/2013    Performed by Markus Juarez D.O. at ENDOSCOPY [facility]   • LUMPECTOMY  2006    right breast   • PB RADIATION THERAPY PLAN SIMPLE  2006   • CATARACT EXTRACTION WITH IOL  2003    bilateral   • APPENDECTOMY  1974   • HYSTERECTOMY, TOTAL ABDOMINAL  1974                           • TONSILLECTOMY AND ADENOIDECTOMY  as child   • VEIN STRIPPING  1974/1999/2013    b/l legs     Family History   Problem Relation Age of Onset   • Heart Disease Mother    • Hypertension Mother    • Stroke Mother    • Diabetes Mother    • Dementia Mother    • Arthritis Mother    • Diabetes Brother    • Dementia Brother    • Cancer Father         Colon   • Heart Disease Maternal Grandmother    • Kidney Disease Maternal  Grandmother    • Heart Attack Maternal Grandfather    • No Known Problems Brother    • Hypertension Brother    • No Known Problems Brother      Social History     Socioeconomic History   • Marital status:      Spouse name: Not on file   • Number of children: Not on file   • Years of education: Not on file   • Highest education level: Not on file   Occupational History   • Not on file   Tobacco Use   • Smoking status: Never Smoker   • Smokeless tobacco: Never Used   Substance and Sexual Activity   • Alcohol use: No   • Drug use: No   • Sexual activity: Not Currently     Partners: Male     Birth control/protection: Post-Menopausal   Other Topics Concern   • Not on file   Social History Narrative   • Not on file     Social Determinants of Health     Financial Resource Strain:    • Difficulty of Paying Living Expenses: Not on file   Food Insecurity:    • Worried About Running Out of Food in the Last Year: Not on file   • Ran Out of Food in the Last Year: Not on file   Transportation Needs:    • Lack of Transportation (Medical): Not on file   • Lack of Transportation (Non-Medical): Not on file   Physical Activity:    • Days of Exercise per Week: Not on file   • Minutes of Exercise per Session: Not on file   Stress:    • Feeling of Stress : Not on file   Social Connections:    • Frequency of Communication with Friends and Family: Not on file   • Frequency of Social Gatherings with Friends and Family: Not on file   • Attends Gnosticist Services: Not on file   • Active Member of Clubs or Organizations: Not on file   • Attends Club or Organization Meetings: Not on file   • Marital Status: Not on file   Intimate Partner Violence:    • Fear of Current or Ex-Partner: Not on file   • Emotionally Abused: Not on file   • Physically Abused: Not on file   • Sexually Abused: Not on file   Housing Stability:    • Unable to Pay for Housing in the Last Year: Not on file   • Number of Places Lived in the Last Year: Not on file   •  "Unstable Housing in the Last Year: Not on file     Allergies   Allergen Reactions   • Ibuprofen      Stomach upset   • Morphine Vomiting   • Other Misc      Metals: zinc oxide, vanadium chloride, manganese chloride       Current Outpatient Medications   Medication Sig Dispense Refill   • Multiple Vitamin (MULTIVITAMIN PO) Take  by mouth.     • celecoxib (CELEBREX) 100 MG Cap TAKE 1 CAPSULE BY MOUTH TWICE DAILY 120 Capsule 0   • omeprazole (PRILOSEC) 20 MG delayed-release capsule TAKE 1 CAPSULE BY MOUTH EVERY DAY 90 Capsule 4   • amLODIPine (NORVASC) 2.5 MG Tab TAKE ONE TABLET BY MOUTH ONCE DAILY 100 Tab 3   • estradiol (ESTRACE) 0.1 MG/GM vaginal cream insert 1 gram vaginally three times per week as directed 42.5 g 0     No current facility-administered medications for this visit.       ROS  All others systems reviewed and negative.     Objective:     /90 (BP Location: Left arm, Patient Position: Sitting, BP Cuff Size: Adult)   Pulse 66   Resp 14   Ht 1.651 m (5' 5\")   Wt 53.1 kg (117 lb)   SpO2 98%  Body mass index is 19.47 kg/m².    General: No acute distress. Well nourished.  HEENT: EOM grossly intact, no scleral icterus, no pharyngeal erythema.   Neck:  No JVD, no bruits, trachea midline  CVS: RRR. Normal S1, S2. No M/R/G. No LE edema.  2+ radial pulses, 2+ PT pulses  Resp: CTAB. No wheezing or crackles/rhonchi. Normal respiratory effort.  Abdomen: Soft, NT, no hank hepatomegaly.  MSK/Ext: No clubbing or cyanosis.  Skin: Warm and dry, no rashes.  Neurological: CN III-XII grossly intact. No focal deficits.   Psych: A&O x 3, appropriate affect, good judgement        Assessment:     1. Nonspecific abnormal electrocardiogram (ECG) (EKG)  EC-ECHOCARDIOGRAM COMPLETE W/O CONT   2. Nonspecific intraventricular block  EC-ECHOCARDIOGRAM COMPLETE W/O CONT   3. LAFB (left anterior fascicular block)     4. Aortic atherosclerosis (HCC)     5. Chronic venous insufficiency     6. Essential hypertension     7. " Premature atrial contraction  EC-ECHOCARDIOGRAM COMPLETE W/O CONT    Cardiac Event Monitor   8. HX: breast cancer         Medical Decision Making:  Today's Assessment / Status / Plan:     -No symptoms but PACs can be a precursor to afib. I don't want to alarm her about this, just know s/sx of CVA, discussed today. Consider alive cor  -Echocardiogram to see if she truly has some LVH and assess the size of the left atrium  -24-hour heart monitor at her convenience  -Information regarding blood pressure, she may need a smaller cuff  -No concerns about nonspecific intraventricular conduction delay  -APN 3 months    Written instructions given today:      -Sometimes a lot of premature atrial contractions can be a precursor to a rhythm change called atrial fibrillation.  We have not idea if you will ever go into afib or not.    -An echocardiogram can tell me more about the shape of your heart.    -A heart monitor can tell me how many PACs you have in a 24 hour period.    -It can be challenging to detect atrial fibrillation when you have a lot of PACS because the PACs can make the heart rate seem irregular.    For monitoring your palpitations you can consider the [employer] Suzie with [employer] device for you smartphone, you can save a PDF copy and email it to me via [employer].          You can learn more about it at this website:    https://www.[employer]/    Can be purchased online, any retailer.      Checking Blood Pressure:  -Blood pressure cuff, spend in the $40-65, with good return policy  -It should be automatic, upper arm, measure your arm to get the correct size, probably adult Large but your arm should be under 16.5 cm. If you need a smaller cuff, you may have to call a medical supply company or look online.  -Put the cuff in place, rest arm on table near height of your heart, sit quietly for 5 min, legs uncrossed, with back support, then take your blood pressure, write it down, keep a log  -Check no more than 1 time  "day, maybe 4-5 times per week, try different times of day.  -Can bring your cuff to at least one appointment where it can be calibrated to a manual cuff if you are concerned.  -Goal blood pressure is at least under 130/80, ideally under 120/80.  If you think your BP is overall too high, let us know in the office, we can adjust medications, can use MyChart or call the [place] office: [phone].    Salt=sodium=sea salt, guidelines say stay under 2,500 mg daily but I ask for under 4,000 mg daily.  Get salt smart, start looking at labels, count it up.  Salt is hidden in everything, salad dressing, sauces, cheese, most canned food, any processed meat.    -Stroke: Think \"FAST\"  F- face asymmetry, funny smile  A- arm doesn't work, numbness/weakness  S- speech is slurred  T- time, 9-1-1 emergency    Signs of a heart attack: Anything very intense coming from the chest that lasts more than 15 minutes, consider calling 911.  -Pain or pressure in the chest that is intense, lasts more than 15 minutes, not explained by other known reasons such as known/similar heartburn  -It can feel like severe heart burn but associated with nausea, vomiting  -It can be vague pain that radiates to the neck, jaw, shoulders, arm/arms, wrap around your back or even cause a toothache  -Can be associated with unusual shortness of breath at rest or sense of impending doom      Return in about 3 months (around 2/24/2022).    It is my pleasure to participate in the care of Ms. Fuller.  Please do not hesitate to contact me with questions or concerns.    Marietta Wagner MD, [facility]  Cardiologist [facility]    Please note that this dictation was created using voice recognition software. I have made every reasonable attempt to correct obvious errors, but it is possible there are errors of grammar and possibly content that I did not discover before finalizing the note.  "

## 2021-11-24 ENCOUNTER — OFFICE VISIT (OUTPATIENT)
Dept: CARDIOLOGY | Facility: MEDICAL CENTER | Age: 81
End: 2021-11-24
Attending: FAMILY MEDICINE
Payer: MEDICARE

## 2021-11-24 VITALS
SYSTOLIC BLOOD PRESSURE: 140 MMHG | OXYGEN SATURATION: 98 % | HEART RATE: 66 BPM | DIASTOLIC BLOOD PRESSURE: 90 MMHG | WEIGHT: 117 LBS | RESPIRATION RATE: 14 BRPM | HEIGHT: 65 IN | BODY MASS INDEX: 19.49 KG/M2

## 2021-11-24 DIAGNOSIS — I44.4 LAFB (LEFT ANTERIOR FASCICULAR BLOCK): ICD-10-CM

## 2021-11-24 DIAGNOSIS — Z85.3 HX: BREAST CANCER: ICD-10-CM

## 2021-11-24 DIAGNOSIS — I49.1 PREMATURE ATRIAL CONTRACTION: ICD-10-CM

## 2021-11-24 DIAGNOSIS — I70.0 ATHEROSCLEROSIS OF AORTA (HCC): ICD-10-CM

## 2021-11-24 DIAGNOSIS — I45.4 NONSPECIFIC INTRAVENTRICULAR BLOCK: ICD-10-CM

## 2021-11-24 DIAGNOSIS — I10 ESSENTIAL HYPERTENSION: ICD-10-CM

## 2021-11-24 DIAGNOSIS — I87.2 CHRONIC VENOUS INSUFFICIENCY: ICD-10-CM

## 2021-11-24 DIAGNOSIS — R94.31 NONSPECIFIC ABNORMAL ELECTROCARDIOGRAM (ECG) (EKG): ICD-10-CM

## 2021-11-24 PROCEDURE — 99204 OFFICE O/P NEW MOD 45 MIN: CPT | Performed by: INTERNAL MEDICINE

## 2021-11-24 ASSESSMENT — FIBROSIS 4 INDEX: FIB4 SCORE: 1.12

## 2021-11-24 NOTE — PATIENT INSTRUCTIONS
-Sometimes a lot of premature atrial contractions can be a precursor to a rhythm change called atrial fibrillation.  We have not idea if you will ever go into afib or not.    -An echocardiogram can tell me more about the shape of your heart.    -A heart monitor can tell me how many PACs you have in a 24 hour period.    -It can be challenging to detect atrial fibrillation when you have a lot of PACS because the PACs can make the heart rate seem irregular.    For monitoring your palpitations you can consider the [employer] Suzie with [employer] device for you smartphone, you can save a PDF copy and email it to me via [employer].          You can learn more about it at this website:    https://www.[employer]/    Can be purchased online, any retailer.      Checking Blood Pressure:  -Blood pressure cuff, spend in the $40-65, with good return policy  -It should be automatic, upper arm, measure your arm to get the correct size, probably adult Large but your arm should be under 16.5 cm. If you need a smaller cuff, you may have to call a medical supply company or look online.  -Put the cuff in place, rest arm on table near height of your heart, sit quietly for 5 min, legs uncrossed, with back support, then take your blood pressure, write it down, keep a log  -Check no more than 1 time day, maybe 4-5 times per week, try different times of day.  -Can bring your cuff to at least one appointment where it can be calibrated to a manual cuff if you are concerned.  -Goal blood pressure is at least under 130/80, ideally under 120/80.  If you think your BP is overall too high, let us know in the office, we can adjust medications, can use [employer] or call the [facility] office: [phone].    Salt=sodium=sea salt, guidelines say stay under 2,500 mg daily but I ask for under 4,000 mg daily.  Get salt smart, start looking at labels, count it up.  Salt is hidden in everything, salad dressing, sauces, cheese, most canned food, any processed  "meat.    -Stroke: Think \"FAST\"  F- face asymmetry, funny smile  A- arm doesn't work, numbness/weakness  S- speech is slurred  T- time, 9-1-1 emergency    Signs of a heart attack: Anything very intense coming from the chest that lasts more than 15 minutes, consider calling 911.  -Pain or pressure in the chest that is intense, lasts more than 15 minutes, not explained by other known reasons such as known/similar heartburn  -It can feel like severe heart burn but associated with nausea, vomiting  -It can be vague pain that radiates to the neck, jaw, shoulders, arm/arms, wrap around your back or even cause a toothache  -Can be associated with unusual shortness of breath at rest or sense of impending doom        "

## 2021-11-24 NOTE — LETTER
[facility]   [address], [address]  [place] [address]  Phone: [phone]  Fax: [phone]              Beth Fuller  1940    Encounter Date: 11/24/2021    Marietta Wagner M.D.          PROGRESS NOTE:  Subjective:   Chief Complaint:   Chief Complaint   Patient presents with   • Hypertension   • Arrhythmia     NP Dx: Cardiac arrhythmia, unspecified cardiac arrhythmia type       Beth Fuller is a 81 y.o. female who is referred by Dr. Jm Manzo for abnormal ECG, PACs, possible LVH on echo, HTN.    Saw PCP, noted lots of PACs on exam.  ECG in October 2021 was sinus, first-degree AV delay, nonspecific intraventricular conduction delay, left intravesicular block, PACs.    Remote echocardiogram in 2014 with mild LVH.    Has hypertension, BP up a bit.    Has chronic venous insufficiency.    Prior breast CA, radiation to mid chest, partial mastectomy, no chemo.    Needs to stay hydrated to avoid lightheadedness.    Prior provoked DVT after knee surgery.  Tells me prone to blood clots, happened during childbirth.    No prior hyperlipidemia. LDL 36.  No family history of premature coronary artery disease.  Mother had CVA at 78 and 80. No known afib.  No prior smoking history.  No history of diabetes.  No history of autoimmune disease such as lupus or rheumatoid arthritis.  No chronic kidney disease. Did have kidney problem as child that required shots, had swelling.  No ETOH overuse.  No caffeine overuse.  No recreation substance use.  No hx asthma.    Active, goes to the gym.  Not limited by chest pain, pressure or tightness with activity.   No significant dyspnea on exertion, orthopnea or lower extremity swelling.   No significant palpitations, lightheadedness, or presyncope/syncope.   No symptoms of leg claudication.   No stroke/TIA like symptoms.    Here with her daughter Annemarie.    DATA REVIEWED by me:  ECG (my personal interpretation)  10/27/2021  Sinus, 61, PAC, first-degree AV delay, nonspecific intraventricular conduction delay, left anterior fascicular block    ECG 6/11/2020  Sinus, 69, first-degree AV delay, nonspecific intraventricular conduction delay, left anterior fascicular block    Echo 2014  Normal left ventricular systolic function.   Mild concentric left ventricular hypertrophy.   Grade I diastolic dysfunction is present.   Enlarged right ventricular size.   Mitral annular calcification.   Trace mitral regurgitation.   Aortic sclerosis without stenosis.   At least mild aortic insufficiency.   Mild tricuspid regurgitation.   Trace pulmonic insufficiency.     Most recent labs:       Lab Results   Component Value Date/Time    WBC 4.6 (L) 11/21/2020 09:40 AM    HEMOGLOBIN 12.3 11/21/2020 09:40 AM    HEMATOCRIT 37.1 11/21/2020 09:40 AM    MCV 93.0 11/21/2020 09:40 AM    INR 0.93 09/24/2020 06:00 PM      Lab Results   Component Value Date/Time    SODIUM 138 09/26/2020 07:32 AM    POTASSIUM 4.0 09/26/2020 07:32 AM    CHLORIDE 103 09/26/2020 07:32 AM    CO2 25 09/26/2020 07:32 AM    GLUCOSE 99 09/26/2020 07:32 AM    BUN 11 09/26/2020 07:32 AM    CREATININE 0.73 09/26/2020 07:32 AM      Lab Results   Component Value Date/Time    ASTSGOT 12 09/26/2020 07:32 AM    ALTSGPT 12 09/26/2020 07:32 AM    ALBUMIN 3.3 09/26/2020 07:32 AM      Lab Results   Component Value Date/Time    CHOLSTRLTOT 131 06/13/2020 02:09 AM    LDL 36 06/13/2020 02:09 AM    HDL 78 06/13/2020 02:09 AM    TRIGLYCERIDE 86 06/13/2020 02:09 AM     No results for input(s): NTPROBNP, TROPONINT in the last 72 hours.      Past Medical History:   Diagnosis Date   • Anesthesia 1974/2002    PONV   • Arthritis    • Breast cancer (HCC)    • Cancer (HCC) 2006    right breast    • CATARACT     surgical correction bilateral   • Dental disorder     bridge upper front; 5/7/2015 dental extraction with infection; temporary right lower   • Diverticulitis    • GERD (gastroesophageal reflux  disease)    • Heart burn 12/17/13   • Hypertension    • Indigestion    • MEDICAL HOME 6/9/2015   • Osteoporosis    • Pain     chronic pain knees; ankles, feet, right shoulder, arm and hand   • Personal history of venous thrombosis and embolism 12/31/2013    leg   • Pneumonia 1984   • PVD (peripheral vascular disease) (HCC)    • Unspecified hemorrhagic conditions     had GI bleed diverticulitis     Past Surgical History:   Procedure Laterality Date   • PB COLONOSCOPY,DIAGNOSTIC N/A 9/25/2020    Procedure: COLONOSCOPY;  Surgeon: Dino Pavon M.D.;  Location: SURGERY SAME DAY [facility];  Service: Gastroenterology   • COLONOSCOPY WITH POLYP N/A 6/13/2020    Procedure: COLONOSCOPY, WITH POLYPECTOMY;  Surgeon: Sam Soliman D.O.;  Location: SURGERY [facility];  Service: Gastroenterology   • GANGLION EXCISION Right 10/17/2019    Procedure: EXCISION, GANGLION CYST- WRIST;  Surgeon: Og Lima M.D.;  Location: SURGERY [facility];  Service: Orthopedics   • KNEE ARTHROPLASTY TOTAL Right 6/8/2015    Procedure: KNEE ARTHROPLASTY TOTAL;  Surgeon: Markus York M.D.;  Location: SURGERY [facility];  Service:    • GASTROSCOPY WITH BIOPSY  6/16/2014    Performed by Dino Pavon M.D. at [facility]   • EGD W/ENDOSCOPIC ULTRASOUND  6/16/2014    Performed by Dino Pavon M.D. at [facility]   • KNEE ARTHROPLASTY TOTAL  12/30/2013    left; Performed by Markus York M.D. at [facility]   • COLONOSCOPY - ENDO  12/6/2013    Performed by Markus Juarez D.O. at ENDOS[facility]   • LUMPECTOMY  2006    right breast   • PB RADIATION THERAPY PLAN SIMPLE  2006   • CATARACT EXTRACTION WITH IOL  2003    bilateral   • APPENDECTOMY  1974   • HYSTERECTOMY, TOTAL ABDOMINAL  1974                           • TONSILLECTOMY AND ADENOIDECTOMY  as child   • VEIN STRIPPING  1974/1999/2013    b/l legs     Family History   Problem Relation Age of Onset   • Heart Disease  Mother    • Hypertension Mother    • Stroke Mother    • Diabetes Mother    • Dementia Mother    • Arthritis Mother    • Diabetes Brother    • Dementia Brother    • Cancer Father         Colon   • Heart Disease Maternal Grandmother    • Kidney Disease Maternal Grandmother    • Heart Attack Maternal Grandfather    • No Known Problems Brother    • Hypertension Brother    • No Known Problems Brother      Social History     Socioeconomic History   • Marital status:      Spouse name: Not on file   • Number of children: Not on file   • Years of education: Not on file   • Highest education level: Not on file   Occupational History   • Not on file   Tobacco Use   • Smoking status: Never Smoker   • Smokeless tobacco: Never Used   Substance and Sexual Activity   • Alcohol use: No   • Drug use: No   • Sexual activity: Not Currently     Partners: Male     Birth control/protection: Post-Menopausal   Other Topics Concern   • Not on file   Social History Narrative   • Not on file     Social Determinants of Health     Financial Resource Strain:    • Difficulty of Paying Living Expenses: Not on file   Food Insecurity:    • Worried About Running Out of Food in the Last Year: Not on file   • Ran Out of Food in the Last Year: Not on file   Transportation Needs:    • Lack of Transportation (Medical): Not on file   • Lack of Transportation (Non-Medical): Not on file   Physical Activity:    • Days of Exercise per Week: Not on file   • Minutes of Exercise per Session: Not on file   Stress:    • Feeling of Stress : Not on file   Social Connections:    • Frequency of Communication with Friends and Family: Not on file   • Frequency of Social Gatherings with Friends and Family: Not on file   • Attends Oriental orthodox Services: Not on file   • Active Member of Clubs or Organizations: Not on file   • Attends Club or Organization Meetings: Not on file   • Marital Status: Not on file   Intimate Partner Violence:    • Fear of Current or Ex-Partner:  "Not on file   • Emotionally Abused: Not on file   • Physically Abused: Not on file   • Sexually Abused: Not on file   Housing Stability:    • Unable to Pay for Housing in the Last Year: Not on file   • Number of Places Lived in the Last Year: Not on file   • Unstable Housing in the Last Year: Not on file     Allergies   Allergen Reactions   • Ibuprofen      Stomach upset   • Morphine Vomiting   • Other Misc      Metals: zinc oxide, vanadium chloride, manganese chloride       Current Outpatient Medications   Medication Sig Dispense Refill   • Multiple Vitamin (MULTIVITAMIN PO) Take  by mouth.     • celecoxib (CELEBREX) 100 MG Cap TAKE 1 CAPSULE BY MOUTH TWICE DAILY 120 Capsule 0   • omeprazole (PRILOSEC) 20 MG delayed-release capsule TAKE 1 CAPSULE BY MOUTH EVERY DAY 90 Capsule 4   • amLODIPine (NORVASC) 2.5 MG Tab TAKE ONE TABLET BY MOUTH ONCE DAILY 100 Tab 3   • estradiol (ESTRACE) 0.1 MG/GM vaginal cream insert 1 gram vaginally three times per week as directed 42.5 g 0     No current facility-administered medications for this visit.       ROS  All others systems reviewed and negative.     Objective:     /90 (BP Location: Left arm, Patient Position: Sitting, BP Cuff Size: Adult)   Pulse 66   Resp 14   Ht 1.651 m (5' 5\")   Wt 53.1 kg (117 lb)   SpO2 98%  Body mass index is 19.47 kg/m².    General: No acute distress. Well nourished.  HEENT: EOM grossly intact, no scleral icterus, no pharyngeal erythema.   Neck:  No JVD, no bruits, trachea midline  CVS: RRR. Normal S1, S2. No M/R/G. No LE edema.  2+ radial pulses, 2+ PT pulses  Resp: CTAB. No wheezing or crackles/rhonchi. Normal respiratory effort.  Abdomen: Soft, NT, no hank hepatomegaly.  MSK/Ext: No clubbing or cyanosis.  Skin: Warm and dry, no rashes.  Neurological: CN III-XII grossly intact. No focal deficits.   Psych: A&O x 3, appropriate affect, good judgement        Assessment:     1. Nonspecific abnormal electrocardiogram (ECG) (EKG)  " EC-ECHOCARDIOGRAM COMPLETE W/O CONT   2. Nonspecific intraventricular block  EC-ECHOCARDIOGRAM COMPLETE W/O CONT   3. LAFB (left anterior fascicular block)     4. Aortic atherosclerosis (HCC)     5. Chronic venous insufficiency     6. Essential hypertension     7. Premature atrial contraction  EC-ECHOCARDIOGRAM COMPLETE W/O CONT    Cardiac Event Monitor   8. HX: breast cancer         Medical Decision Making:  Today's Assessment / Status / Plan:     -No symptoms but PACs can be a precursor to afib. I don't want to alarm her about this, just know s/sx of CVA, discussed today. Consider alive cor  -Echocardiogram to see if she truly has some LVH and assess the size of the left atrium  -24-hour heart monitor at her convenience  -Information regarding blood pressure, she may need a smaller cuff  -No concerns about nonspecific intraventricular conduction delay  -APN 3 months    Written instructions given today:      -Sometimes a lot of premature atrial contractions can be a precursor to a rhythm change called atrial fibrillation.  We have not idea if you will ever go into afib or not.    -An echocardiogram can tell me more about the shape of your heart.    -A heart monitor can tell me how many PACs you have in a 24 hour period.    -It can be challenging to detect atrial fibrillation when you have a lot of PACS because the PACs can make the heart rate seem irregular.    For monitoring your palpitations you can consider the Snapfish Suzie with [employer]ive[employer] device for you smartphone, you can save a PDF copy and email it to me via [employer].          You can learn more about it at this website:    https://www.Retora Black/    Can be purchased online, any retailer.      Checking Blood Pressure:  -Blood pressure cuff, spend in the $40-65, with good return policy  -It should be automatic, upper arm, measure your arm to get the correct size, probably adult Large but your arm should be under 16.5 cm. If you need a smaller cuff, you may have  "to call a medical Easy Pairings company or look online.  -Put the cuff in place, rest arm on table near height of your heart, sit quietly for 5 min, legs uncrossed, with back support, then take your blood pressure, write it down, keep a log  -Check no more than 1 time day, maybe 4-5 times per week, try different times of day.  -Can bring your cuff to at least one appointment where it can be calibrated to a manual cuff if you are concerned.  -Goal blood pressure is at least under 130/80, ideally under 120/80.  If you think your BP is overall too high, let us know in the office, we can adjust medications, can use [facility] or call the i[employer] office: [phone].    Salt=sodium=sea salt, guidelines say stay under 2,500 mg daily but I ask for under 4,000 mg daily.  Get salt smart, start looking at labels, count it up.  Salt is hidden in everything, salad dressing, sauces, cheese, most canned food, any processed meat.    -Stroke: Think \"FAST\"  F- face asymmetry, funny smile  A- arm doesn't work, numbness/weakness  S- speech is slurred  T- time, 9-1-1 emergency    Signs of a heart attack: Anything very intense coming from the chest that lasts more than 15 minutes, consider calling 911.  -Pain or pressure in the chest that is intense, lasts more than 15 minutes, not explained by other known reasons such as known/similar heartburn  -It can feel like severe heart burn but associated with nausea, vomiting  -It can be vague pain that radiates to the neck, jaw, shoulders, arm/arms, wrap around your back or even cause a toothache  -Can be associated with unusual shortness of breath at rest or sense of impending doom      Return in about 3 months (around 2/24/2022).    It is my pleasure to participate in the care of Ms. Fuller.  Please do not hesitate to contact me with questions or concerns.    Marietta Wagner MD, [facility]  Cardiologist [facility]    Please note that this dictation was created using voice " recognition software. I have made every reasonable attempt to correct obvious errors, but it is possible there are errors of grammar and possibly content that I did not discover before finalizing the note.        Jm Manzo M.D.  [address] [address]  Via In Basket

## 2021-11-30 ENCOUNTER — APPOINTMENT (OUTPATIENT)
Dept: RADIOLOGY | Facility: MEDICAL CENTER | Age: 81
End: 2021-11-30
Attending: FAMILY MEDICINE
Payer: MEDICARE

## 2021-12-22 ENCOUNTER — NON-PROVIDER VISIT (OUTPATIENT)
Dept: CARDIOLOGY | Facility: MEDICAL CENTER | Age: 81
End: 2021-12-22
Payer: MEDICARE

## 2021-12-22 DIAGNOSIS — I44.7 BUNDLE BRANCH BLOCK, LEFT: ICD-10-CM

## 2021-12-22 DIAGNOSIS — R00.1 BRADYCARDIA: ICD-10-CM

## 2021-12-22 DIAGNOSIS — I47.10 SVT (SUPRAVENTRICULAR TACHYCARDIA) (HCC): ICD-10-CM

## 2021-12-27 ENCOUNTER — APPOINTMENT (OUTPATIENT)
Dept: RADIOLOGY | Facility: MEDICAL CENTER | Age: 81
End: 2021-12-27
Attending: FAMILY MEDICINE
Payer: MEDICARE

## 2022-01-04 DIAGNOSIS — I49.1 PREMATURE ATRIAL CONTRACTION: ICD-10-CM

## 2022-01-04 LAB — EKG IMPRESSION: NORMAL

## 2022-01-04 PROCEDURE — 93224 XTRNL ECG REC UP TO 48 HRS: CPT | Performed by: INTERNAL MEDICINE

## 2022-01-10 ENCOUNTER — TELEPHONE (OUTPATIENT)
Dept: CARDIOLOGY | Facility: MEDICAL CENTER | Age: 82
End: 2022-01-10

## 2022-01-10 NOTE — TELEPHONE ENCOUNTER
Message  Received: Today  Marietta Wagner M.D.  Alexia Zhou R.N.  -Heart monitor shows frequent PVCs but not at a burden for which we need to be concerned.  No medication changes.                 Cardiac Event Monitor      Called pt and informed her.

## 2022-01-31 ENCOUNTER — TELEPHONE (OUTPATIENT)
Dept: CARDIOLOGY | Facility: MEDICAL CENTER | Age: 82
End: 2022-01-31

## 2022-01-31 NOTE — TELEPHONE ENCOUNTER
LS    Pt called stating she wore a heart monitor and the results came back fine so she would like to know if she still needs to get the echo that is scheduled for 2/28/2022 done. Please call Pt back at [phone].    Thank you

## 2022-02-10 ENCOUNTER — TELEPHONE (OUTPATIENT)
Dept: HEALTH INFORMATION MANAGEMENT | Facility: OTHER | Age: 82
End: 2022-02-10

## 2022-02-15 ENCOUNTER — OFFICE VISIT (OUTPATIENT)
Dept: MEDICAL GROUP | Facility: PHYSICIAN GROUP | Age: 82
End: 2022-02-15
Payer: MEDICARE

## 2022-02-15 VITALS
TEMPERATURE: 99.5 F | OXYGEN SATURATION: 99 % | HEIGHT: 65 IN | HEART RATE: 96 BPM | BODY MASS INDEX: 20.16 KG/M2 | SYSTOLIC BLOOD PRESSURE: 146 MMHG | DIASTOLIC BLOOD PRESSURE: 84 MMHG | RESPIRATION RATE: 16 BRPM | WEIGHT: 121 LBS

## 2022-02-15 DIAGNOSIS — I10 PRIMARY HYPERTENSION: ICD-10-CM

## 2022-02-15 DIAGNOSIS — M67.431 GANGLION CYST OF WRIST, RIGHT: ICD-10-CM

## 2022-02-15 DIAGNOSIS — M79.641 PAIN OF RIGHT HAND: ICD-10-CM

## 2022-02-15 DIAGNOSIS — I70.0 ATHEROSCLEROSIS OF AORTA (HCC): ICD-10-CM

## 2022-02-15 DIAGNOSIS — Z76.89 ENCOUNTER TO ESTABLISH CARE: ICD-10-CM

## 2022-02-15 PROBLEM — K13.79 MOUTH PAIN: Status: RESOLVED | Noted: 2019-02-08 | Resolved: 2022-02-15

## 2022-02-15 PROCEDURE — 99214 OFFICE O/P EST MOD 30 MIN: CPT | Performed by: FAMILY MEDICINE

## 2022-02-15 RX ORDER — CHOLECALCIFEROL (VITAMIN D3) 125 MCG
5000 CAPSULE ORAL
COMMUNITY

## 2022-02-15 RX ORDER — ESTRADIOL 0.1 MG/G
CREAM VAGINAL
Qty: 42.5 G | Refills: 5 | Status: SHIPPED | OUTPATIENT
Start: 2022-02-15 | End: 2023-04-25

## 2022-02-15 ASSESSMENT — FIBROSIS 4 INDEX: FIB4 SCORE: 1.12

## 2022-02-15 ASSESSMENT — PATIENT HEALTH QUESTIONNAIRE - PHQ9: CLINICAL INTERPRETATION OF PHQ2 SCORE: 0

## 2022-02-15 NOTE — ASSESSMENT & PLAN NOTE
This is a chronic problem.  Patient has had a previous removal of a ganglion cyst but it recurred.  She has some discomfort in that area as well.  She is asking for a referral back to orthopedics.

## 2022-02-15 NOTE — ASSESSMENT & PLAN NOTE
This is a chronic problem.  Patient states that she has some pain radiating from her wrist down to the medial aspect of her right hand.  No known injury.

## 2022-02-15 NOTE — PROGRESS NOTES
Annual Health Assessment Questions:    1.  Are you currently engaging in any exercise or physical activity? Yes    2.  How would you describe your mood or emotional well-being today? good    3.  Have you had any falls in the last year? No    4.  Have you noticed any problems with your balance or had difficulty walking? No    5.  In the last six months have you experienced any leakage of urine? Yes    6. DPA/Advanced Directive: Patient has Living Will, but it is not on file. Instructed to bring in a copy to scan into their chart.   abdominal pain

## 2022-02-15 NOTE — PROGRESS NOTES
Subjective:     CC: Here to establish care and go over several of her health care issues.    HPI:   Irina presents today with the following medical concerns:    Encounter to establish care  Patient is here today to establish care.  She is a previous  Patient of Dr. Manzo.  Her physical exam is current she is due for labs.  She has a few concerns to go over today.    Aortic atherosclerosis (HCC)  This is a chronic problem.  As noted on previous x-ray studies.    Ganglion cyst of wrist, right  This is a chronic problem.  Patient has had a previous removal of a ganglion cyst but it recurred.  She has some discomfort in that area as well.  She is asking for a referral back to orthopedics.    Hypertension  This is a chronic problem.  Patient's blood pressures are well controlled on current medications.    Pain of right hand  This is a chronic problem.  Patient states that she has some pain radiating from her wrist down to the medial aspect of her right hand.  No known injury.      Past Medical History:   Diagnosis Date   • Anesthesia 1974/2002    PONV   • Arthritis    • Breast cancer (HCC)    • Cancer (HCC) 2006    right breast    • CATARACT     surgical correction bilateral   • Dental disorder     bridge upper front; 5/7/2015 dental extraction with infection; temporary right lower   • Diverticulitis    • GERD (gastroesophageal reflux disease)    • Heart burn 12/17/13   • Hypertension    • Indigestion    • MEDICAL HOME 6/9/2015   • Osteoporosis    • Pain     chronic pain knees; ankles, feet, right shoulder, arm and hand   • Personal history of venous thrombosis and embolism 12/31/2013    leg   • Pneumonia 1984   • PVD (peripheral vascular disease) ([facility])    • Unspecified hemorrhagic conditions     had GI bleed diverticulitis       Social History     Tobacco Use   • Smoking status: Never Smoker   • Smokeless tobacco: Never Used   Vaping Use   • Vaping Use: Never used   Substance Use Topics   • Alcohol use: No   • Drug  "use: No       Current Outpatient Medications Ordered in Epic   Medication Sig Dispense Refill   • Cholecalciferol (VITAMIN D) 125 MCG (5000 UT) Cap Take  by mouth.     • KRILL OIL PO Take  by mouth.     • estradiol (ESTRACE) 0.1 MG/GM vaginal cream Use 1 gram vaginally three times per week 42.5 g 5   • amLODIPine (NORVASC) 2.5 MG Tab TAKE 1 TABLET BY MOUTH EVERY  Tablet 3   • Multiple Vitamin (MULTIVITAMIN PO) Take  by mouth.     • celecoxib (CELEBREX) 100 MG Cap TAKE 1 CAPSULE BY MOUTH TWICE DAILY 120 Capsule 0   • omeprazole (PRILOSEC) 20 MG delayed-release capsule TAKE 1 CAPSULE BY MOUTH EVERY DAY 90 Capsule 4     No current Epic-ordered facility-administered medications on file.       Allergies:  Ibuprofen, Morphine, and Other misc    Health Maintenance: Completed    ROS:  Gen: no fevers/chills, no changes in weight  Eyes: no changes in vision  ENT: no sore throat, no hearing loss, no bloody nose  Pulm: no sob, no cough  CV: no chest pain, no palpitations  GI: no nausea/vomiting, no diarrhea  : no dysuria  Skin: no rash  Neuro: no headaches, no numbness/tingling  Heme/Lymph: no easy bruising      Objective:       Exam:  /84 (BP Location: Right arm, Patient Position: Sitting, BP Cuff Size: Adult)   Pulse 96   Temp 37.5 °C (99.5 °F) (Temporal)   Resp 16   Ht 1.651 m (5' 5\")   Wt 54.9 kg (121 lb)   SpO2 99%   Breastfeeding No   BMI 20.14 kg/m²  Body mass index is 20.14 kg/m².    Gen: Alert and oriented, No apparent distress.  Neck: Neck is supple without lymphadenopathy.  Lungs: Normal effort, CTA bilaterally, no wheezes, rhonchi, or rales  CV: Regular rate and rhythm. No murmurs, rubs, or gallops.  Abdomen: Soft, nontender, no organomegaly or masses normal bowel sounds.  Ext: No clubbing, cyanosis, edema.  She does have what appears to be a ganglion cyst to the right medial anterior wrist.  Arthritic changes seen in the fingers of her hands.  She does appear to have some muscle atrophy in " her hands as well.  Neuro: Cranial nerves II through VIII are grossly intact.  No lateralized signs are seen.      Labs: Reviewed and additional labs ordered.    Assessment & Plan:     81 y.o. female with the following -     1. Primary hypertension  This is a chronic problem under good control.  Continue current medications.  2. Ganglion cyst of wrist, right  This is a chronic recurrent problem.  Referral to orthopedics made.    3. Pain of right hand  This is a chronic problem.  She is told she might have ulnar tunnel syndrome on this hand.  We will refer her to orthopedics for evaluations    4. Aortic atherosclerosis (HCC)  This is a chronic problem.  Continue to follow.    5. Encounter to establish care  Patient establish care with me today.  Baseline labs ordered.      Return in about 6 months (around 8/15/2022) for Long.    Please note that this dictation was created using voice recognition software. I have made every reasonable attempt to correct obvious errors, but I expect that there are errors of grammar and possibly content that I did not discover before finalizing the note.

## 2022-02-15 NOTE — ASSESSMENT & PLAN NOTE
Patient is here today to establish care.  She is a previous  Patient of Dr. Manzo.  Her physical exam is current she is due for labs.  She has a few concerns to go over today.

## 2022-02-28 ENCOUNTER — HOSPITAL ENCOUNTER (OUTPATIENT)
Dept: CARDIOLOGY | Facility: MEDICAL CENTER | Age: 82
End: 2022-02-28
Attending: INTERNAL MEDICINE
Payer: MEDICARE

## 2022-02-28 DIAGNOSIS — R94.31 NONSPECIFIC ABNORMAL ELECTROCARDIOGRAM (ECG) (EKG): ICD-10-CM

## 2022-02-28 DIAGNOSIS — I45.4 NONSPECIFIC INTRAVENTRICULAR BLOCK: ICD-10-CM

## 2022-02-28 DIAGNOSIS — I49.1 PREMATURE ATRIAL CONTRACTION: ICD-10-CM

## 2022-02-28 LAB
LV EJECT FRACT  99904: 65
LV EJECT FRACT MOD 2C 99903: 49.99
LV EJECT FRACT MOD 4C 99902: 59.88
LV EJECT FRACT MOD BP 99901: 52.64

## 2022-02-28 PROCEDURE — 93306 TTE W/DOPPLER COMPLETE: CPT

## 2022-02-28 PROCEDURE — 93306 TTE W/DOPPLER COMPLETE: CPT | Mod: 26 | Performed by: INTERNAL MEDICINE

## 2022-03-03 ENCOUNTER — TELEPHONE (OUTPATIENT)
Dept: CARDIOLOGY | Facility: MEDICAL CENTER | Age: 82
End: 2022-03-03
Payer: MEDICARE

## 2022-03-03 NOTE — TELEPHONE ENCOUNTER
LS  Patient would like the results of her Echo. Please reach out to her at [phone].    Thank you,   Jennifer MATHEW

## 2022-03-18 ENCOUNTER — HOSPITAL ENCOUNTER (OUTPATIENT)
Dept: LAB | Facility: MEDICAL CENTER | Age: 82
End: 2022-03-18
Attending: FAMILY MEDICINE
Payer: MEDICARE

## 2022-03-18 DIAGNOSIS — I10 PRIMARY HYPERTENSION: ICD-10-CM

## 2022-03-18 LAB
ALBUMIN SERPL BCP-MCNC: 4.3 G/DL (ref 3.2–4.9)
ALBUMIN/GLOB SERPL: 1.7 G/DL
ALP SERPL-CCNC: 87 U/L (ref 30–99)
ALT SERPL-CCNC: 11 U/L (ref 2–50)
ANION GAP SERPL CALC-SCNC: 11 MMOL/L (ref 7–16)
APPEARANCE UR: CLEAR
AST SERPL-CCNC: 14 U/L (ref 12–45)
BACTERIA #/AREA URNS HPF: NEGATIVE /HPF
BASOPHILS # BLD AUTO: 1.2 % (ref 0–1.8)
BASOPHILS # BLD: 0.05 K/UL (ref 0–0.12)
BILIRUB SERPL-MCNC: 0.6 MG/DL (ref 0.1–1.5)
BILIRUB UR QL STRIP.AUTO: NEGATIVE
BUN SERPL-MCNC: 25 MG/DL (ref 8–22)
CALCIUM SERPL-MCNC: 9.6 MG/DL (ref 8.5–10.5)
CAOX CRY #/AREA URNS HPF: NORMAL /HPF
CHLORIDE SERPL-SCNC: 102 MMOL/L (ref 96–112)
CHOLEST SERPL-MCNC: 180 MG/DL (ref 100–199)
CO2 SERPL-SCNC: 27 MMOL/L (ref 20–33)
COLOR UR: YELLOW
CREAT SERPL-MCNC: 0.8 MG/DL (ref 0.5–1.4)
EOSINOPHIL # BLD AUTO: 0.06 K/UL (ref 0–0.51)
EOSINOPHIL NFR BLD: 1.4 % (ref 0–6.9)
EPI CELLS #/AREA URNS HPF: NORMAL /HPF
ERYTHROCYTE [DISTWIDTH] IN BLOOD BY AUTOMATED COUNT: 44.3 FL (ref 35.9–50)
FASTING STATUS PATIENT QL REPORTED: NORMAL
GFR SERPLBLD CREATININE-BSD FMLA CKD-EPI: 73 ML/MIN/1.73 M 2
GLOBULIN SER CALC-MCNC: 2.6 G/DL (ref 1.9–3.5)
GLUCOSE SERPL-MCNC: 105 MG/DL (ref 65–99)
GLUCOSE UR STRIP.AUTO-MCNC: NEGATIVE MG/DL
HCT VFR BLD AUTO: 41.7 % (ref 37–47)
HDLC SERPL-MCNC: 119 MG/DL
HGB BLD-MCNC: 14.1 G/DL (ref 12–16)
HYALINE CASTS #/AREA URNS LPF: NORMAL /LPF
IMM GRANULOCYTES # BLD AUTO: 0.01 K/UL (ref 0–0.11)
IMM GRANULOCYTES NFR BLD AUTO: 0.2 % (ref 0–0.9)
KETONES UR STRIP.AUTO-MCNC: NEGATIVE MG/DL
LDLC SERPL CALC-MCNC: 51 MG/DL
LEUKOCYTE ESTERASE UR QL STRIP.AUTO: ABNORMAL
LYMPHOCYTES # BLD AUTO: 0.48 K/UL (ref 1–4.8)
LYMPHOCYTES NFR BLD: 11.3 % (ref 22–41)
MCH RBC QN AUTO: 30.5 PG (ref 27–33)
MCHC RBC AUTO-ENTMCNC: 33.8 G/DL (ref 33.6–35)
MCV RBC AUTO: 90.3 FL (ref 81.4–97.8)
MICRO URNS: ABNORMAL
MONOCYTES # BLD AUTO: 0.53 K/UL (ref 0–0.85)
MONOCYTES NFR BLD AUTO: 12.5 % (ref 0–13.4)
NEUTROPHILS # BLD AUTO: 3.12 K/UL (ref 2–7.15)
NEUTROPHILS NFR BLD: 73.4 % (ref 44–72)
NITRITE UR QL STRIP.AUTO: NEGATIVE
NRBC # BLD AUTO: 0 K/UL
NRBC BLD-RTO: 0 /100 WBC
PH UR STRIP.AUTO: 6.5 [PH] (ref 5–8)
PLATELET # BLD AUTO: 265 K/UL (ref 164–446)
PMV BLD AUTO: 11.3 FL (ref 9–12.9)
POTASSIUM SERPL-SCNC: 4.7 MMOL/L (ref 3.6–5.5)
PROT SERPL-MCNC: 6.9 G/DL (ref 6–8.2)
PROT UR QL STRIP: NEGATIVE MG/DL
RBC # BLD AUTO: 4.62 M/UL (ref 4.2–5.4)
RBC # URNS HPF: NORMAL /HPF
RBC UR QL AUTO: NEGATIVE
SODIUM SERPL-SCNC: 140 MMOL/L (ref 135–145)
SP GR UR STRIP.AUTO: 1.02
TRIGL SERPL-MCNC: 52 MG/DL (ref 0–149)
TSH SERPL DL<=0.005 MIU/L-ACNC: 4.06 UIU/ML (ref 0.38–5.33)
UROBILINOGEN UR STRIP.AUTO-MCNC: 0.2 MG/DL
WBC # BLD AUTO: 4.3 K/UL (ref 4.8–10.8)
WBC #/AREA URNS HPF: NORMAL /HPF

## 2022-03-18 PROCEDURE — 36415 COLL VENOUS BLD VENIPUNCTURE: CPT

## 2022-03-18 PROCEDURE — 85025 COMPLETE CBC W/AUTO DIFF WBC: CPT

## 2022-03-18 PROCEDURE — 80053 COMPREHEN METABOLIC PANEL: CPT

## 2022-03-18 PROCEDURE — 81001 URINALYSIS AUTO W/SCOPE: CPT

## 2022-03-18 PROCEDURE — 84443 ASSAY THYROID STIM HORMONE: CPT

## 2022-03-18 PROCEDURE — 80061 LIPID PANEL: CPT

## 2022-03-22 ENCOUNTER — TELEPHONE (OUTPATIENT)
Dept: MEDICAL GROUP | Facility: PHYSICIAN GROUP | Age: 82
End: 2022-03-22
Payer: MEDICARE

## 2022-03-22 NOTE — TELEPHONE ENCOUNTER
----- Message from Pancho Cole III, M.D. sent at 3/20/2022  2:26 PM PDT -----  Regarding: labs  Let her know that her labs look good and can be rechecked in 1 year. Dr Cole

## 2022-03-22 NOTE — TELEPHONE ENCOUNTER
Phone Number Called:  [phone]      Call outcome: Called the pt.  Provided the information from the message from Dr. Deleon. pt verbalized understanding. Pt stated that was was very glad everything came back good. Pt verbalized relief

## 2022-04-04 DIAGNOSIS — M15.9 PRIMARY OSTEOARTHRITIS INVOLVING MULTIPLE JOINTS: ICD-10-CM

## 2022-04-04 RX ORDER — CELECOXIB 100 MG/1
100 CAPSULE ORAL 2 TIMES DAILY
Qty: 180 CAPSULE | Refills: 3 | Status: SHIPPED | OUTPATIENT
Start: 2022-04-04 | End: 2022-10-11

## 2022-04-14 ENCOUNTER — OFFICE VISIT (OUTPATIENT)
Dept: CARDIOLOGY | Facility: MEDICAL CENTER | Age: 82
End: 2022-04-14
Payer: MEDICARE

## 2022-04-14 VITALS
HEIGHT: 65 IN | OXYGEN SATURATION: 98 % | WEIGHT: 120 LBS | RESPIRATION RATE: 14 BRPM | DIASTOLIC BLOOD PRESSURE: 84 MMHG | SYSTOLIC BLOOD PRESSURE: 136 MMHG | HEART RATE: 71 BPM | BODY MASS INDEX: 19.99 KG/M2

## 2022-04-14 DIAGNOSIS — I49.1 PREMATURE ATRIAL CONTRACTION: ICD-10-CM

## 2022-04-14 DIAGNOSIS — R00.1 BRADYCARDIA: ICD-10-CM

## 2022-04-14 DIAGNOSIS — R94.31 NONSPECIFIC ABNORMAL ELECTROCARDIOGRAM (ECG) (EKG): ICD-10-CM

## 2022-04-14 DIAGNOSIS — I47.10 SVT (SUPRAVENTRICULAR TACHYCARDIA) (HCC): ICD-10-CM

## 2022-04-14 DIAGNOSIS — I10 ESSENTIAL HYPERTENSION: ICD-10-CM

## 2022-04-14 DIAGNOSIS — I70.0 ATHEROSCLEROSIS OF AORTA (HCC): ICD-10-CM

## 2022-04-14 DIAGNOSIS — I44.4 LAFB (LEFT ANTERIOR FASCICULAR BLOCK): ICD-10-CM

## 2022-04-14 DIAGNOSIS — I87.2 CHRONIC VENOUS INSUFFICIENCY: ICD-10-CM

## 2022-04-14 DIAGNOSIS — I44.7 BUNDLE BRANCH BLOCK, LEFT: ICD-10-CM

## 2022-04-14 PROCEDURE — 99213 OFFICE O/P EST LOW 20 MIN: CPT | Performed by: NURSE PRACTITIONER

## 2022-04-14 ASSESSMENT — ENCOUNTER SYMPTOMS
PALPITATIONS: 0
SPUTUM PRODUCTION: 0
DIZZINESS: 0
HEMOPTYSIS: 0
CHILLS: 0
CLAUDICATION: 0
NAUSEA: 0
ORTHOPNEA: 0
FEVER: 0
HEADACHES: 0
VOMITING: 0
PND: 0
COUGH: 0
SHORTNESS OF BREATH: 0
WHEEZING: 0

## 2022-04-14 ASSESSMENT — FIBROSIS 4 INDEX: FIB4 SCORE: 1.34

## 2022-04-14 NOTE — PROGRESS NOTES
Chief Complaint   Patient presents with   • Abnormal EKG   • Hypertension       Subjective     Irina Fuller is a 82 y.o. female who presents today for abnormal ECG, PACs, possible LVH on echo, HTN.  Patient was last seen 11/24/2021 by Dr. Wagner.    Since 11/24/2021, the patient has had echocardiogram which indicates EF 65%, mild concentric LVH, only 1 cm, normal left atrial size, trivial aortic valve sclerosis without stenosis.  Patient also had outpatient heart monitor indicating sinus with LBBB, normal heart rate range, no pauses, frequent PVCs with 9.7% burden occasional bigeminy, couplets, occasional PACs with no sustained rhythm changes.  Patient reports no chest pain, palpitations, orthopnea, or PND.     Saw PCP, noted lots of PACs on exam.  ECG in October 2021 was sinus, first-degree AV delay, nonspecific intraventricular conduction delay, left intravesicular block, PACs.     Remote echocardiogram in 2014 with mild LVH.     Has hypertension, BP up a bit.     Has chronic venous insufficiency.     Prior breast CA, radiation to mid chest, partial mastectomy, no chemo.     Needs to stay hydrated to avoid lightheadedness.     Prior provoked DVT after knee surgery.  Tells me prone to blood clots, happened during childbirth.     No prior hyperlipidemia. LDL 36.  No family history of premature coronary artery disease.  Mother had CVA at 78 and 80. No known afib.  No prior smoking history.  No history of diabetes.  No history of autoimmune disease such as lupus or rheumatoid arthritis.  No chronic kidney disease. Did have kidney problem as child that required shots, had swelling.  No ETOH overuse.  No caffeine overuse.  No recreation substance use.  No hx asthma.     Active, goes to the gym.  Not limited by chest pain, pressure or tightness with activity.   No significant dyspnea on exertion, orthopnea or lower extremity swelling.   No significant palpitations, lightheadedness, or presyncope/syncope.   No  symptoms of leg claudication.   No stroke/TIA like symptoms.     Here with her daughter Annemarie.    Past Medical History:   Diagnosis Date   • Anesthesia 1974/2002    PONV   • Arthritis    • Breast cancer (HCC)    • Cancer (HCC) 2006    right breast    • CATARACT     surgical correction bilateral   • Dental disorder     bridge upper front; 5/7/2015 dental extraction with infection; temporary right lower   • Diverticulitis    • GERD (gastroesophageal reflux disease)    • Heart burn 12/17/13   • Hypertension    • Indigestion    • MEDICAL HOME 6/9/2015   • Osteoporosis    • Pain     chronic pain knees; ankles, feet, right shoulder, arm and hand   • Personal history of venous thrombosis and embolism 12/31/2013    leg   • Pneumonia 1984   • PVD (peripheral vascular disease) (HCC)    • Unspecified hemorrhagic conditions     had GI bleed diverticulitis     Past Surgical History:   Procedure Laterality Date   • OR COLONOSCOPY,DIAGNOSTIC N/A 9/25/2020    Procedure: COLONOSCOPY;  Surgeon: Dino Pavon M.D.;  Location: SURGERY SAME DAY [facility];  Service: Gastroenterology   • COLONOSCOPY WITH POLYP N/A 6/13/2020    Procedure: COLONOSCOPY, WITH POLYPECTOMY;  Surgeon: Sam Soliman D.O.;  Location: SURGERY [facility];  Service: Gastroenterology   • GANGLION EXCISION Right 10/17/2019    Procedure: EXCISION, GANGLION CYST- WRIST;  Surgeon: Og Lima M.D.;  Location: [facility];  Service: Orthopedics   • KNEE ARTHROPLASTY TOTAL Right 6/8/2015    Procedure: KNEE ARTHROPLASTY TOTAL;  Surgeon: Markus York M.D.;  Location: [facility];  Service:    • GASTROSCOPY WITH BIOPSY  6/16/2014    Performed by Dino Pavon M.D. at [facility]   • EGD W/ENDOSCOPIC ULTRASOUND  6/16/2014    Performed by Dino Pavon M.D. at [facility]   • KNEE ARTHROPLASTY TOTAL  12/30/2013    left; Performed by Markus York M.D. at [facility]   • COLONOSCOPY - ENDO   12/6/2013    Performed by Markus Juarez D.O. at ENDOSCOPY [facility]   • LUMPECTOMY  2006    right breast   • DC RADIATION THERAPY PLAN SIMPLE  2006   • CATARACT EXTRACTION WITH IOL  2003    bilateral   • APPENDECTOMY  1974   • HYSTERECTOMY, TOTAL ABDOMINAL  1974                           • TONSILLECTOMY AND ADENOIDECTOMY  as child   • VEIN STRIPPING  1974/1999/2013    b/l legs     Family History   Problem Relation Age of Onset   • Heart Disease Mother    • Hypertension Mother    • Stroke Mother    • Diabetes Mother    • Dementia Mother    • Arthritis Mother    • Diabetes Brother    • Dementia Brother    • Cancer Father         Colon   • Heart Disease Maternal Grandmother    • Kidney Disease Maternal Grandmother    • Heart Attack Maternal Grandfather    • No Known Problems Brother    • Hypertension Brother    • No Known Problems Brother      Social History     Socioeconomic History   • Marital status:      Spouse name: Not on file   • Number of children: Not on file   • Years of education: Not on file   • Highest education level: Not on file   Occupational History   • Not on file   Tobacco Use   • Smoking status: Never Smoker   • Smokeless tobacco: Never Used   Vaping Use   • Vaping Use: Never used   Substance and Sexual Activity   • Alcohol use: No   • Drug use: No   • Sexual activity: Not Currently     Partners: Male     Birth control/protection: Post-Menopausal   Other Topics Concern   • Not on file   Social History Narrative   • Not on file     Social Determinants of Health     Financial Resource Strain: Not on file   Food Insecurity: Not on file   Transportation Needs: Not on file   Physical Activity: Not on file   Stress: Not on file   Social Connections: Not on file   Intimate Partner Violence: Not on file   Housing Stability: Not on file     Allergies   Allergen Reactions   • Ibuprofen      Stomach upset   • Morphine Vomiting   • Other Misc      Metals: zinc oxide, vanadium chloride,  "manganese chloride     Outpatient Encounter Medications as of 4/14/2022   Medication Sig Dispense Refill   • celecoxib (CELEBREX) 100 MG Cap Take 1 Capsule by mouth 2 times a day. 180 Capsule 3   • Cholecalciferol (VITAMIN D) 125 MCG (5000 UT) Cap Take  by mouth.     • KRILL OIL PO Take  by mouth.     • estradiol (ESTRACE) 0.1 MG/GM vaginal cream Use 1 gram vaginally three times per week 42.5 g 5   • amLODIPine (NORVASC) 2.5 MG Tab TAKE 1 TABLET BY MOUTH EVERY  Tablet 3   • Multiple Vitamin (MULTIVITAMIN PO) Take  by mouth.     • omeprazole (PRILOSEC) 20 MG delayed-release capsule TAKE 1 CAPSULE BY MOUTH EVERY DAY 90 Capsule 4     No facility-administered encounter medications on file as of 4/14/2022.     Review of Systems   Constitutional: Negative for chills and fever.   Respiratory: Negative for cough, hemoptysis, sputum production, shortness of breath and wheezing.    Cardiovascular: Negative for chest pain, palpitations, orthopnea, claudication, leg swelling and PND.   Gastrointestinal: Negative for nausea and vomiting.   Neurological: Negative for dizziness and headaches.   All other systems reviewed and are negative.             Objective     /84 (BP Location: Right arm, Patient Position: Sitting, BP Cuff Size: Adult)   Pulse 71   Resp 14   Ht 1.651 m (5' 5\")   Wt 54.4 kg (120 lb)   SpO2 98%   BMI 19.97 kg/m²     Physical Exam  Vitals and nursing note reviewed.   Constitutional:       Appearance: She is well-developed.   Neck:      Vascular: No JVD.   Cardiovascular:      Rate and Rhythm: Normal rate and regular rhythm.      Heart sounds: Normal heart sounds. No murmur heard.  Pulmonary:      Effort: Pulmonary effort is normal.      Breath sounds: Normal breath sounds.   Abdominal:      Palpations: Abdomen is soft.   Musculoskeletal:      Cervical back: Neck supple.   Skin:     General: Skin is warm and dry.   Neurological:      Comments: Cranial nerves II-XII WNL   Psychiatric:         " Behavior: Behavior normal.         Thought Content: Thought content normal.         Judgment: Judgment normal.            ECG (my personal interpretation) 10/27/2021  Sinus, 61, PAC, first-degree AV delay, nonspecific intraventricular conduction delay, left anterior fascicular block     ECG 6/11/2020  Sinus, 69, first-degree AV delay, nonspecific intraventricular conduction delay, left anterior fascicular block     Echo 2014  Normal left ventricular systolic function.   Mild concentric left ventricular hypertrophy.   Grade I diastolic dysfunction is present.   Enlarged right ventricular size.   Mitral annular calcification.   Trace mitral regurgitation.   Aortic sclerosis without stenosis.   At least mild aortic insufficiency.   Mild tricuspid regurgitation.   Trace pulmonic insufficiency.     Echo 2/28/2022  CONCLUSIONS   The left ventricular ejection fraction is visually estimated to be 65%.   Mild concentric left ventricular hypertrophy, only 1 cm.   Normal left atrial size.   Trivial aortic valve sclerosis without significant stenosis.   Only mild mitral/aortic regurgitation.   Estimated right ventricular systolic pressure is 25 mmHg.     Compared to the prior echo on 03/21/14, no significant change.     Assessment & Plan     1. Nonspecific abnormal electrocardiogram (ECG) (EKG)     2. Premature atrial contraction     3. Bundle branch block, left     4. Bradycardia     5. SVT (supraventricular tachycardia) (HCC)     6. LAFB (left anterior fascicular block)     7. Aortic atherosclerosis (HCC)     8. Chronic venous insufficiency     9. Essential hypertension         Medical Decision Making: Today's Assessment/Status/Plan:   Patient will continue amlodipine 2.5 mg daily.  I would like the patient to start checking her blood pressure 2 hours after taking amlodipine in the morning a few times per week and keep a log.  She will follow-up with Dr. Boyer to establish care

## 2022-05-20 ENCOUNTER — OFFICE VISIT (OUTPATIENT)
Dept: MEDICAL GROUP | Facility: PHYSICIAN GROUP | Age: 82
End: 2022-05-20
Payer: MEDICARE

## 2022-05-20 VITALS
HEART RATE: 72 BPM | DIASTOLIC BLOOD PRESSURE: 82 MMHG | BODY MASS INDEX: 19.99 KG/M2 | RESPIRATION RATE: 16 BRPM | WEIGHT: 120 LBS | TEMPERATURE: 98.2 F | SYSTOLIC BLOOD PRESSURE: 132 MMHG | OXYGEN SATURATION: 98 % | HEIGHT: 65 IN

## 2022-05-20 DIAGNOSIS — I87.2 CHRONIC VENOUS INSUFFICIENCY: ICD-10-CM

## 2022-05-20 DIAGNOSIS — I10 ESSENTIAL HYPERTENSION: ICD-10-CM

## 2022-05-20 DIAGNOSIS — L84 CALLUS OF FOOT: ICD-10-CM

## 2022-05-20 DIAGNOSIS — I10 PRIMARY HYPERTENSION: ICD-10-CM

## 2022-05-20 PROBLEM — Z76.89 ENCOUNTER TO ESTABLISH CARE: Status: RESOLVED | Noted: 2022-02-15 | Resolved: 2022-05-20

## 2022-05-20 PROCEDURE — 99214 OFFICE O/P EST MOD 30 MIN: CPT | Performed by: FAMILY MEDICINE

## 2022-05-20 RX ORDER — AMLODIPINE BESYLATE 2.5 MG/1
TABLET ORAL
Qty: 100 TABLET | Refills: 3 | Status: SHIPPED | OUTPATIENT
Start: 2022-05-20 | End: 2022-07-22

## 2022-05-20 RX ORDER — LATANOPROST 50 UG/ML
SOLUTION/ DROPS OPHTHALMIC
COMMUNITY
Start: 2022-05-19

## 2022-05-20 ASSESSMENT — FIBROSIS 4 INDEX: FIB4 SCORE: 1.34

## 2022-05-20 NOTE — ASSESSMENT & PLAN NOTE
This is a chronic problem.  Patient states that she has a problem with chronic redness and purple discoloration to her lower extremities particularly toward the end of the day.  She is been evaluated previously and known to have vascular insufficiency and stasis dermatitis.  She states she has had 3 vein stripping procedures to her legs.  She like to go back to the vascular clinic to see if anything else can be done.  She does wear compression stockings on a regular basis and feels that they are helpful.  Denies any current pain in her calfs.

## 2022-05-20 NOTE — ASSESSMENT & PLAN NOTE
This is a chronic problem.  Patient blood pressures under good control.  She needs a refill on her medications.

## 2022-05-20 NOTE — PROGRESS NOTES
Subjective:     CC: Here for several issues.    HPI:   Beth presents today with the following medical concerns:    Chronic venous insufficiency  This is a chronic problem.  Patient states that she has a problem with chronic redness and purple discoloration to her lower extremities particularly toward the end of the day.  She is been evaluated previously and known to have vascular insufficiency and stasis dermatitis.  She states she has had 3 vein stripping procedures to her legs.  She like to go back to the vascular clinic to see if anything else can be done.  She does wear compression stockings on a regular basis and feels that they are helpful.  Denies any current pain in her calfs.    Callus of foot  This is a new problem.  Patient been having pain to the bottom of her left foot.  Her previous podiatrist has retired.    Hypertension  This is a chronic problem.  Patient blood pressures under good control.  She needs a refill on her medications.      Past Medical History:   Diagnosis Date   • Anesthesia 1974/2002    PONV   • Arthritis    • Breast cancer ([facility])    • Cancer ([facility]) 2006    right breast    • CATARACT     surgical correction bilateral   • Dental disorder     bridge upper front; 5/7/2015 dental extraction with infection; temporary right lower   • Diverticulitis    • GERD (gastroesophageal reflux disease)    • Heart burn 12/17/13   • Hypertension    • Indigestion    • MEDICAL HOME 6/9/2015   • Osteoporosis    • Pain     chronic pain knees; ankles, feet, right shoulder, arm and hand   • Personal history of venous thrombosis and embolism 12/31/2013    leg   • Pneumonia 1984   • PVD (peripheral vascular disease) ([facility])    • Unspecified hemorrhagic conditions     had GI bleed diverticulitis       Social History     Tobacco Use   • Smoking status: Never Smoker   • Smokeless tobacco: Never Used   Vaping Use   • Vaping Use: Never used   Substance Use Topics   • Alcohol use: No   • Drug use: No       Current  "Outpatient Medications Ordered in Epic   Medication Sig Dispense Refill   • latanoprost (XALATAN) 0.005 % Solution      • amLODIPine (NORVASC) 2.5 MG Tab TAKE 1 TABLET BY MOUTH EVERY  Tablet 3   • celecoxib (CELEBREX) 100 MG Cap Take 1 Capsule by mouth 2 times a day. 180 Capsule 3   • Cholecalciferol (VITAMIN D) 125 MCG (5000 UT) Cap Take  by mouth.     • KRILL OIL PO Take  by mouth.     • estradiol (ESTRACE) 0.1 MG/GM vaginal cream Use 1 gram vaginally three times per week 42.5 g 5   • Multiple Vitamin (MULTIVITAMIN PO) Take  by mouth.     • omeprazole (PRILOSEC) 20 MG delayed-release capsule TAKE 1 CAPSULE BY MOUTH EVERY DAY 90 Capsule 4     No current Epic-ordered facility-administered medications on file.       Allergies:  Ibuprofen, Morphine, and Other misc    Health Maintenance: Completed    ROS:  Gen: no fevers/chills, no changes in weight  Eyes: no changes in vision  ENT: no sore throat, no hearing loss, no bloody nose  Pulm: no sob, no cough  CV: no chest pain, no palpitations  GI: no nausea/vomiting, no diarrhea  : no dysuria  MSk: no myalgias  Skin: no rash  Neuro: no headaches, no numbness/tingling  Heme/Lymph: no easy bruising      Objective:       Exam:  /82 (BP Location: Left arm, Patient Position: Sitting, BP Cuff Size: Child)   Pulse 72   Temp 36.8 °C (98.2 °F) (Temporal)   Resp 16   Ht 1.651 m (5' 5\")   Wt 54.4 kg (120 lb)   SpO2 98%   Breastfeeding No   BMI 19.97 kg/m²  Body mass index is 19.97 kg/m².    Gen: Alert and oriented, No apparent distress.  Ext: No clubbing or edema.  She has dark reddish color to both of her feet.  Skin:    Patient has a callus formation in the bottom of the left mid forefoot.  She also has extensive onychomycosis to her toenails.  (Says has used Lamisil 3 times without success).        Labs: Current    Assessment & Plan:     82 y.o. female with the following -     1. Essential hypertension  This is a chronic problem.  Continue current " medications.  Prescription sent in.  - amLODIPine (NORVASC) 2.5 MG Tab; TAKE 1 TABLET BY MOUTH EVERY DAY  Dispense: 100 Tablet; Refill: 3    2. Chronic venous insufficiency  This is a chronic problem.  Patient was told she should continue using her compression stockings and elevation when sitting.  We can refer her back to the vascular clinic although I am not sure there is anything they can do different at this time.  She wants to just get another updated opinion.  - Referral to Vascular Medicine    3. Callus of foot  This is a new problem.  Referral to podiatry made.  - Referral to Podiatry          Return if symptoms worsen or fail to improve.    Please note that this dictation was created using voice recognition software. I have made every reasonable attempt to correct obvious errors, but I expect that there are errors of grammar and possibly content that I did not discover before finalizing the note.

## 2022-05-20 NOTE — ASSESSMENT & PLAN NOTE
This is a new problem.  Patient been having pain to the bottom of her left foot.  Her previous podiatrist has retired.

## 2022-07-22 ENCOUNTER — OFFICE VISIT (OUTPATIENT)
Dept: VASCULAR LAB | Facility: MEDICAL CENTER | Age: 82
End: 2022-07-22
Attending: FAMILY MEDICINE
Payer: MEDICARE

## 2022-07-22 VITALS
HEIGHT: 65 IN | DIASTOLIC BLOOD PRESSURE: 88 MMHG | BODY MASS INDEX: 20.56 KG/M2 | WEIGHT: 123.4 LBS | HEART RATE: 92 BPM | SYSTOLIC BLOOD PRESSURE: 150 MMHG

## 2022-07-22 DIAGNOSIS — I87.2 CHRONIC VENOUS INSUFFICIENCY: ICD-10-CM

## 2022-07-22 DIAGNOSIS — Z86.16 HISTORY OF COVID-19: ICD-10-CM

## 2022-07-22 DIAGNOSIS — I70.0 ABDOMINAL AORTIC ATHEROSCLEROSIS (HCC): ICD-10-CM

## 2022-07-22 DIAGNOSIS — Z86.718 HISTORY OF DVT (DEEP VEIN THROMBOSIS): ICD-10-CM

## 2022-07-22 DIAGNOSIS — K86.2 PANCREATIC CYST: ICD-10-CM

## 2022-07-22 DIAGNOSIS — I51.7 MILD CONCENTRIC LEFT VENTRICULAR HYPERTROPHY (LVH): ICD-10-CM

## 2022-07-22 DIAGNOSIS — I10 PRIMARY HYPERTENSION: ICD-10-CM

## 2022-07-22 PROCEDURE — 99212 OFFICE O/P EST SF 10 MIN: CPT

## 2022-07-22 PROCEDURE — 99204 OFFICE O/P NEW MOD 45 MIN: CPT | Performed by: FAMILY MEDICINE

## 2022-07-22 RX ORDER — HYDROCORTISONE ACETATE 0.5 %
1 CREAM (GRAM) TOPICAL 2 TIMES DAILY
COMMUNITY
End: 2023-04-06

## 2022-07-22 RX ORDER — LOSARTAN POTASSIUM 25 MG/1
25 TABLET ORAL DAILY
Qty: 90 TABLET | Refills: 3 | Status: SHIPPED | OUTPATIENT
Start: 2022-07-22 | End: 2022-11-18

## 2022-07-22 ASSESSMENT — ENCOUNTER SYMPTOMS
COUGH: 0
PALPITATIONS: 0
FEVER: 0
ORTHOPNEA: 0
BRUISES/BLEEDS EASILY: 0
WEAKNESS: 0
CLAUDICATION: 0
MYALGIAS: 0
NAUSEA: 0
CHILLS: 0

## 2022-07-22 ASSESSMENT — FIBROSIS 4 INDEX: FIB4 SCORE: 1.34

## 2022-07-22 NOTE — PROGRESS NOTES
INITIAL VASCULAR MEDICINE VISIT  Beth Fuller is a 82 y.o. female  who presents today 07/22/22  for   Chief Complaint   Patient presents with   • Follow-Up     initially referred by Pancho Cole III, MD for eval and med mgmt of CVI and associated symptoms      Subjective         Chronic venous insufficiency:  Current/Interval hx:  Swelling in L ankle   Symptoms: worsening L > R ankle swelling, no assoc pain, no skin ulcerations, no claudication  Age of onset: 20-30s   Hx of known VTE:  2014 - LLE gastro v DVT, R GSV SVT.  Had preg-associate VTE in the distant past.  Had R TKA - placed in 2013 on DOAC.  L TKA 2015 - no clots.  No current OAC  Prior imaging or work-up: 2014 - duplex scan  Prior interventions:     Compression stockings: thigh-high 20-30mmHg   Medications/phlebotonics: none    Procedural interventions:  Multiple vein stripping, [place] in [place]     HTN:  No current concerns, stable, tolerating meds.   Has been on amlodipine for >6mo  Home BP log: not routinely     Hyperlipidemia:No current medications     Antiplatelet/anticoagulation: No    CKD:  No     Sleeping disorder/MILLICENT: No     Hypothyroidism: No     Past Medical History:   Diagnosis Date   • Anesthesia 1974/2002    PONV   • Arthritis    • Breast cancer (HCC)    • Cancer (HCC) 2006    right breast    • CATARACT     surgical correction bilateral   • Dental disorder     bridge upper front; 5/7/2015 dental extraction with infection; temporary right lower   • Diverticulitis    • GERD (gastroesophageal reflux disease)    • Heart burn 12/17/13   • Hypertension    • Indigestion    • MEDICAL HOME 6/9/2015   • Osteoporosis    • Pain     chronic pain knees; ankles, feet, right shoulder, arm and hand   • Personal history of venous thrombosis and embolism 12/31/2013    leg   • Pneumonia 1984   • PVD (peripheral vascular disease) (HCC)    • Unspecified hemorrhagic conditions     had GI bleed diverticulitis     Past Surgical History:   Procedure  Laterality Date   • DE COLONOSCOPY,DIAGNOSTIC N/A 9/25/2020    Procedure: COLONOSCOPY;  Surgeon: Dino Pavon M.D.;  Location: SURGERY SAME DAY [facility];  Service: Gastroenterology   • COLONOSCOPY WITH POLYP N/A 6/13/2020    Procedure: COLONOSCOPY, WITH POLYPECTOMY;  Surgeon: Sam Soliman D.O.;  Location: SURGERY [facility];  Service: Gastroenterology   • GANGLION EXCISION Right 10/17/2019    Procedure: EXCISION, GANGLION CYST- WRIST;  Surgeon: Og Lima M.D.;  Location: SURGERY [facility];  Service: Orthopedics   • KNEE ARTHROPLASTY TOTAL Right 6/8/2015    Procedure: KNEE ARTHROPLASTY TOTAL;  Surgeon: Markus York M.D.;  Location: SURGERY [facility];  Service:    • GASTROSCOPY WITH BIOPSY  6/16/2014    Performed by Dino Pavon M.D. at [facility]   • EGD W/ENDOSCOPIC ULTRASOUND  6/16/2014    Performed by Dino Pavon M.D. at [facility]   • KNEE ARTHROPLASTY TOTAL  12/30/2013    left; Performed by Markus York M.D. at [facility]   • COLONOSCOPY - ENDO  12/6/2013    Performed by Markus Juarez D.O. at ENDOSCOPY [facility]   • LUMPECTOMY  2006    right breast   • DE RADIATION THERAPY PLAN SIMPLE  2006   • CATARACT EXTRACTION WITH IOL  2003    bilateral   • APPENDECTOMY  1974   • HYSTERECTOMY, TOTAL ABDOMINAL  1974                           • TONSILLECTOMY AND ADENOIDECTOMY  as child   • VEIN STRIPPING  1974/1999/2013    b/l legs        Current Outpatient Medications:   •  losartan, 25 mg, Oral, DAILY  •  [place], 1 Capsule, Oral, BID  •  latanoprost, , PRN  •  celecoxib, 100 mg, Oral, BID, Taking  •  Vitamin D, Take  by mouth., Taking  •  KRILL OIL PO, Take  by mouth., Taking  •  estradiol, Use 1 gram vaginally three times per week, Taking  •  Multiple Vitamin (MULTIVITAMIN PO), Take  by mouth., Taking  •  omeprazole, TAKE 1 CAPSULE BY MOUTH EVERY DAY, Taking   Allergies   Allergen Reactions   • Ibuprofen      Stomach  "upset   • Lisinopril      dizz   • Morphine Vomiting   • Other Misc      Metals: zinc oxide, vanadium chloride, manganese chloride     Family History   Problem Relation Age of Onset   • Heart Disease Mother    • Hypertension Mother    • Stroke Mother    • Diabetes Mother    • Dementia Mother    • Arthritis Mother    • Diabetes Brother    • Dementia Brother    • Cancer Father         Colon   • Heart Disease Maternal Grandmother    • Kidney Disease Maternal Grandmother    • Heart Attack Maternal Grandfather    • No Known Problems Brother    • Hypertension Brother    • No Known Problems Brother       Social History     Tobacco Use   • Smoking status: Never Smoker   • Smokeless tobacco: Never Used   Vaping Use   • Vaping Use: Never used   Substance Use Topics   • Alcohol use: No   • Drug use: No     Review of Systems   Constitutional: Negative for chills and fever.   Respiratory: Negative for cough.    Cardiovascular: Positive for leg swelling. Negative for chest pain, palpitations, orthopnea and claudication.   Gastrointestinal: Negative for nausea.   Musculoskeletal: Negative for myalgias.   Neurological: Negative for weakness.   Endo/Heme/Allergies: Does not bruise/bleed easily.           Objective   Vitals:    07/22/22 1423 07/22/22 1426   BP: (!) 154/89 (!) 150/88   BP Location: Right arm Right arm   Patient Position: Sitting Sitting   BP Cuff Size: Small adult Small adult   Pulse: 90 92   Weight: 56 kg (123 lb 6.4 oz)    Height: 1.651 m (5' 5\")       BP Readings from Last 5 Encounters:   07/22/22 (!) 150/88   05/20/22 132/82   04/14/22 136/84   02/15/22 146/84   11/24/21 140/90      Body mass index is 20.53 kg/m².  Physical Exam  Vitals reviewed.   Constitutional:       General: She is not in acute distress.     Appearance: Normal appearance.   HENT:      Head: Normocephalic and atraumatic.   Eyes:      General: Lids are normal.      Extraocular Movements: Extraocular movements intact.      Conjunctiva/sclera: " Conjunctivae normal.   Neck:      Thyroid: No thyroid mass.      Vascular: Normal carotid pulses. No carotid bruit.      Trachea: Trachea normal.   Cardiovascular:      Rate and Rhythm: Normal rate and regular rhythm.      Chest Wall: PMI is not displaced.      Pulses: Normal pulses.           Carotid pulses are 2+ on the right side and 2+ on the left side.       Radial pulses are 2+ on the right side and 2+ on the left side.        Dorsalis pedis pulses are 2+ on the right side and 2+ on the left side.        Posterior tibial pulses are 2+ on the right side and 2+ on the left side.      Heart sounds: Normal heart sounds.      Comments: Stemmer's sign - negative bilat   Spider telangectasia:       RLE:  Diffuse       LLE: deiffuse   Varicosities:           RLE: mixed     LLE: mixed   Corona phlebectatica:      RLE:  mild-mod       LLE:  Moderate   Cording:         RLE:  None     LLE: None         Pulmonary:      Effort: Pulmonary effort is normal.      Breath sounds: Normal breath sounds.   Musculoskeletal:      Cervical back: Full passive range of motion without pain and neck supple.      Right lower leg: No edema.      Left lower le+ Edema (distal ankle to foot only, nontender) present.   Skin:     General: Skin is warm and dry.      Capillary Refill: Capillary refill takes less than 2 seconds.      Coloration: Skin is not cyanotic.      Nails: There is no clubbing.          Neurological:      General: No focal deficit present.      Mental Status: She is alert and oriented to person, place, and time. Mental status is at baseline.      Cranial Nerves: Cranial nerves are intact.      Coordination: Coordination is intact.      Gait: Gait is intact.   Psychiatric:         Mood and Affect: Mood normal.         Behavior: Behavior normal.       Lab Results   Component Value Date    CHOLSTRLTOT 180 2022    LDL 51 2022     2022    TRIGLYCERIDE 52 2022      No results found for: LIPOPROTA    No results found for: APOB    Lab Results   Component Value Date    PROTHROMBTM 12.7 2020    INR 0.93 2020         Lab Results   Component Value Date    SODIUM 140 2022    POTASSIUM 4.7 2022    CHLORIDE 102 2022    CO2 27 2022    GLUCOSE 105 (H) 2022    BUN 25 (H) 2022    CREATININE 0.80 2022    IFAFRICA >60 2020    IFNOTAFR >60 2020        Lab Results   Component Value Date    WBC 4.3 (L) 2022    RBC 4.62 2022    HEMOGLOBIN 14.1 2022    HEMATOCRIT 41.7 2022    MCV 90.3 2022    MCH 30.5 2022    MCHC 33.8 2022    MPV 11.3 2022         Lab Results   Component Value Date/Time    MALBCRT 28 2018 07:53 AM    MICROALBUR 1.9 2018 07:53 AM        VASCULAR IMAGING:   Last EKG:   Results for orders placed or performed in visit on 22   Cardiac Event Monitor   Result Value Ref Range    Report       [facility]    Test Date:  2021  Pt Name:    BENJAMIN SOW               Department: [facility]  MRN:        [id number]                      Room:  Gender:     Female                       Technician: Sridhar  :        [id number]                   Requested By:DESIREE WAGNER  Order #:    [id number]                    Reading MD: Desiree Wagner      Interpretive Statements  *   Monitoring started at 11:20 AM and continued for 24 hours. The overall  rhythm was Sinus with a LBBB. The average heart  rate was 75 BPM. The minimum heart rate was 54 BPM, occurring at 3:32:34 PM.  The maximum heart rate was 120 BPM,  occurring at 4:36:09 PM. The longest R-R interval was 1.7 seconds occurring  at 6:42:04 AM.    *   Ventricular ectopic activity consisted of 2 runs, 2 triplets, 171  couplets, 8348 single, multifocal PVCs, 4 interpolated PVCs,  621 single endiastolic beats, 602 in bigeminy, 424 in trigeminy. The longest  ventricular run occurred at 3:40:04 PM, consisting  of 4 pat ts, with maximum  heart rate of 90 BPM. The fastest ventricular run  occurred at 4:31:26 PM, consisting of 4 beats, with  maximum heart rate of 90 BPM.    *   The patient's rhythm included 14 minutes 19 seconds of bradycardia. The  slowest single episode of bradycardia occurred at  11:58:05 AM, lasting 11 seconds, with minimum heart rate of 57 BPM.    *   The patient's rhythm included 3 minutes 56 seconds of tachycardia. The  fastest single episode of tachycardia occurred at  4:35:14 PM, lasting 1 minute 41 seconds, with maximum heart rate of 120 BPM.    *   Supraventricular ectopic activity consisted of 2 runs, 14 were in atrial  couplets, 94 late beats, 2081 single PACs, 42 in  bigeminy, 499 in trigeminy. The longest supraventricular run occurred at  8:39:03 AM consisting of 4 beats, with maximum heart  rate of 130 BPM. The fastest supraventricular run occurred at 4:37:34 PM,  consisting of 3 beats, with maximum heart rate of  176 BPM.    24 Hour Holter Summary 1-4-21:  Sinus, LBBB, n ormal heart rate range, no pauses.  Frequent PVCs, 9.7% burden, 10,355/24 hrs.  Occasional bigeminy, couplets.  Occasional PACs, 4 beat atrial run.  No sustained rhythm changes.  No symptoms reported.    Electronically Signed On 1-4-2022 15:37:36 PST by Marietta Wagner       BLE venous 2014  1.  No definitive evidence of Right  lower extremity deep venous thrombosis.   2.  There is a partially occlusive thrombus in the left proximal gastrocnemius vein. This can be considered a deep vein.   3. There is a superficial partial thrombus in the right lesser saphenous vein in the posterior calf.   4. Complex left popliteal fluid collection which could be a popliteal cyst or postoperative seroma/resolving hematoma.   5. Minimal fluid collection in the right groin possibly from previous line placement or surgery.    BLE 6/2015   1.  Normal right lower extremity deep venous examination.    2.  Surgical absence of the left greater saphenous vein.    RLE venous  "2015  No evidence of right lower extremity deep venous thrombosis.    Echo 2/2022  The left ventricular ejection fraction is visually estimated to be 65%.  Mild concentric left ventricular hypertrophy, only 1 cm.  Normal left atrial size.  Trivial aortic valve sclerosis without significant stenosis.  Only mild mitral/aortic regurgitation.  Estimated right ventricular systolic pressure is 25 mmHg.   Compared to the prior echo on 03/21/14, no significant change.    CT abd 2020  AORTA and VASCULATURE: shows atherosclerosis without aneurysm.         Medical Decision Making:  Today's Assessment / Status / Plan:     1. Chronic venous insufficiency  US-EXTREMITY VENOUS LOWER BILAT   2. Abdominal aortic atherosclerosis (HCC)     3. Primary hypertension  losartan (COZAAR) 25 MG Tab    Basic Metabolic Panel   4. Mild concentric left ventricular hypertrophy (LVH)     5. History of COVID-19     6. History of DVT (deep vein thrombosis)     7. 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 / Pr,o presumed  R CEAP:  C3aA / Ep,s / A? / P ?   - likely exacerbated by CCB and COX2i  - reviewed anatomy, pathophys and gave educ handouts regarding CVI, common s/s, possible complications, and tx options   Plan:  - stop amlodipine  - reduce or stop celebrex - use tylenol an/carlos topical diclofenac   - check venous reflux duplex to eval for possible options for interventions   - continue thigh-high compression socks, 20-30mmHg, as much as tolerated, reviewed [email or web address] and sizing processes   - increase walking, avoid prolonged standing   - elevated legs while sitting and sleeping above heart level  - reduce sodium (\"salt\") in diet to less than 2,000mg daily   - continue daily moisturizing lotion (such as [employer] Bond Diabetic Foot Cream)  Medications:    - start horse chestnut " seed extract 300mg 2 times daily for 3 to 6 months to determine if helps with venous health - over the counter at most pharmacies or online   - we can consider vasculera - available by prescription only, review website vasculera.com for more information and to determine if covered by insurance, cost about $120 per 90 days     2) VTE disease - recurrent, 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    Plan:  - imaging as per CVI above   - see antithrombotic plan below     3) abd ao athero - no prior aneurysm  - no further suveillance, continue med mgmt     BLOOD PRESSURE MANAGEMENT:  ACC/AHA (2017) goal <130/80  Home BP at goal:  no  Office BP at goal:  no  24h ABPM: not ordered to date  Mild LVH on echo indicative of long-term HTN   Plan:   Monitoring:   - start/continue home BP monitoring, reviewed correct technique, provide BP log and instructions  - order 24h ABPM:  NO  - monitor lytes/gfr routinely   - contact office if BP consistently >140/>90 for discussion of tx adjustments   Medications:  - stop amlodipine, though low dose, may still contribute to LE edema  - start losartan 25mg daily     LIPID MANAGEMENT:   Qualifies for Statin Therapy Based on 2018 ACC/AHA Guidelines: no  The ASCVD Risk score (Sargents DC Jr, et al., 2013) failed to calculate.  Major ASCVD events: None  High-risk conditions: None   Risk-enhancers: N/A  Currently on Statin: No  Tx goals: LDL-C <100 (consider non-HDL-C <130, apoB <90)  At goal? N/A  Plan:   - reinforced ongoing TLC measures as noted   - defer lab surveillance to PCP   Meds: none at this time      ANTITHROMBOTIC/ANTIPLATELET THERAPY: not indicated at this time     GLYCEMIC STATUS: Prediabetic, possible IFG, no a1c available     LIFESTYLE  INTERVENTIONS:    SMOKING:   reports that she has never smoked. She has never used smokeless tobacco.   - continued complete avoidance of all tobacco products     PHYSICAL ACTIVITY: continue healthy activity to improve CV fitness.  In general, targeting >150min/week of moderate-level activity.  Continue excellent fitness program     WEIGHT MANAGEMENT AND NUTRITION:  DASH style dietary approach , Focus on reduced sodium to <2,000mg per day  and Provide specific dietary approach handouts    OTHER:    # pancreatic cyst- stable, defer to PCP and GI for surveillance     # hx of recent COVID - 2nd episode - feeling better, no long sx     Instructed to follow-up with PCP for remainder of adult medical needs: yes  We will partner with other providers in the management of established vascular disease and cardiometabolic risk factors.    Studies to Be Obtained: BLE VR duplex    Labs to Be Obtained: as noted above     Follow up in: 6 weeks    Richard Redd M.D.  [facility]   [facility]   [phone]

## 2022-07-22 NOTE — PATIENT INSTRUCTIONS
"Stop celebrex as can contribute to swelling and high BP  -start tylenol arthritis up to 3 times daily and/or topical diclofenac gel     Keep BP log       - check venous reflux duplex to eval for possible options for interventions   - start/continue knee-high compression socks, 20-30mmHg, as much as tolerated, reviewed [email or web address] and sizing processes   - increase walking, avoid prolonged standing   - elevated legs while sitting and sleeping above heart level  - emphasized need for reduction of central adiposity to reduce extrinsic compression of the low-pressure IVC system to improve venous return and reduce distal venous pressure in legs - reviewed dietary changes and monitor weight and waist circumference  - side sleeping with body pillow may improve lymphatic and venous return by reducing  extrinsic compression on IVC during sleep  - reduce sodium (\"salt\") in diet to less than 2,000mg daily   - continue daily moisturizing lotion (such as Gold Bond Diabetic Foot Cream)  Medications:   - consider horse chestnut seed extract 300mg 2 times daily for 3 to 6 months to determine if helps with venous health - over the counter at most pharmacies or online   - we can consider vasculera - available by prescription only, review website vasculera.com for more information and to determine if covered by insurance, cost about $120 per 90 days      SODIUM RESTRICTIONS:   - consume no more than 2,300mg of sodium daily (look at food labels) ,or if you have high BP then reduce to no more than 1,500mg of sodium daily   For more information visit: [email or web address]/[email or web address]-sodium-diet-the-basics/print?[email or web address]&source=see_link          "

## 2022-09-10 ENCOUNTER — HOSPITAL ENCOUNTER (OUTPATIENT)
Dept: LAB | Facility: MEDICAL CENTER | Age: 82
End: 2022-09-10
Attending: INTERNAL MEDICINE
Payer: MEDICARE

## 2022-09-10 LAB
BUN SERPL-MCNC: 18 MG/DL (ref 8–22)
CREAT SERPL-MCNC: 0.78 MG/DL (ref 0.5–1.4)
GFR SERPLBLD CREATININE-BSD FMLA CKD-EPI: 75 ML/MIN/1.73 M 2

## 2022-09-10 PROCEDURE — 82565 ASSAY OF CREATININE: CPT

## 2022-09-10 PROCEDURE — 84520 ASSAY OF UREA NITROGEN: CPT

## 2022-09-10 PROCEDURE — 36415 COLL VENOUS BLD VENIPUNCTURE: CPT

## 2022-09-16 NOTE — TELEPHONE ENCOUNTER
Received request via: Pharmacy    Was the patient seen in the last year in this department? Yes    Does the patient have an active prescription (recently filled or refills available) for medication(s) requested? No  
(1) Other Diagnosis

## 2022-09-23 ENCOUNTER — HOSPITAL ENCOUNTER (OUTPATIENT)
Dept: RADIOLOGY | Facility: MEDICAL CENTER | Age: 82
End: 2022-09-23
Attending: INTERNAL MEDICINE
Payer: MEDICARE

## 2022-09-23 DIAGNOSIS — K86.2 CYST AND PSEUDOCYST OF PANCREAS: ICD-10-CM

## 2022-09-23 DIAGNOSIS — K86.3 CYST AND PSEUDOCYST OF PANCREAS: ICD-10-CM

## 2022-09-23 PROCEDURE — 700117 HCHG RX CONTRAST REV CODE 255: Performed by: INTERNAL MEDICINE

## 2022-09-23 PROCEDURE — 74170 CT ABD WO CNTRST FLWD CNTRST: CPT

## 2022-09-23 RX ADMIN — IOHEXOL 100 ML: 350 INJECTION, SOLUTION INTRAVENOUS at 09:25

## 2022-09-26 DIAGNOSIS — K21.9 GASTROESOPHAGEAL REFLUX DISEASE WITHOUT ESOPHAGITIS: ICD-10-CM

## 2022-09-26 NOTE — TELEPHONE ENCOUNTER
Patient called in request for omeprazole. She also requested some xanax for traveling so she doesn't get car sick.

## 2022-09-27 RX ORDER — OMEPRAZOLE 20 MG/1
20 CAPSULE, DELAYED RELEASE ORAL
Qty: 90 CAPSULE | Refills: 3 | Status: SHIPPED | OUTPATIENT
Start: 2022-09-27 | End: 2023-04-28 | Stop reason: SDUPTHER

## 2022-10-11 ENCOUNTER — OFFICE VISIT (OUTPATIENT)
Dept: MEDICAL GROUP | Facility: PHYSICIAN GROUP | Age: 82
End: 2022-10-11
Payer: MEDICARE

## 2022-10-11 VITALS
HEART RATE: 82 BPM | HEIGHT: 65 IN | BODY MASS INDEX: 20.83 KG/M2 | DIASTOLIC BLOOD PRESSURE: 78 MMHG | SYSTOLIC BLOOD PRESSURE: 134 MMHG | WEIGHT: 125 LBS | TEMPERATURE: 98.2 F | OXYGEN SATURATION: 99 % | RESPIRATION RATE: 18 BRPM

## 2022-10-11 DIAGNOSIS — I10 PRIMARY HYPERTENSION: ICD-10-CM

## 2022-10-11 DIAGNOSIS — F41.9 ANXIETY: ICD-10-CM

## 2022-10-11 DIAGNOSIS — R68.84 PAIN IN LOWER JAW: ICD-10-CM

## 2022-10-11 DIAGNOSIS — K86.2 PANCREATIC CYST: ICD-10-CM

## 2022-10-11 PROBLEM — M79.641 PAIN OF RIGHT HAND: Status: RESOLVED | Noted: 2022-02-15 | Resolved: 2022-10-11

## 2022-10-11 PROBLEM — R05.9 COUGH: Status: RESOLVED | Noted: 2019-11-26 | Resolved: 2022-10-11

## 2022-10-11 PROCEDURE — 99214 OFFICE O/P EST MOD 30 MIN: CPT | Performed by: FAMILY MEDICINE

## 2022-10-11 RX ORDER — ALPRAZOLAM 0.25 MG/1
0.25 TABLET ORAL 3 TIMES DAILY PRN
Qty: 20 TABLET | Refills: 0 | Status: SHIPPED
Start: 2022-10-11 | End: 2022-11-18

## 2022-10-11 ASSESSMENT — FIBROSIS 4 INDEX: FIB4 SCORE: 1.34

## 2022-10-11 NOTE — ASSESSMENT & PLAN NOTE
This is a chronic problem.  Patient states that she gets pain to the right TMJ area and just below the jaw.  She thinks is due to bad teeth.  She is due to see her dentist in December and has not mentioned this to them before.  She states it is not worse with hot or cold liquids.

## 2022-10-11 NOTE — ASSESSMENT & PLAN NOTE
This is a chronic problem.  Patient states she recently had a CAT scan on September 23 and never heard the results.  It was ordered by a different doctor.  I looked it up for her and told her that the pseudocyst was unchanged.

## 2022-10-11 NOTE — ASSESSMENT & PLAN NOTE
This is a chronic problem.  Patient states that she gets anxiety when traveling particularly in airplanes.  She is due to fly to [place] to visit with her son.  She is asking for refill on her Xanax.  On review of her chart the last prescription was 3 years ago.

## 2022-10-11 NOTE — PROGRESS NOTES
Subjective:     CC: Here for several issues.    HPI:   Beth presents today with the following medical concerns:    Anxiety  This is a chronic problem.  Patient states that she gets anxiety when traveling particularly in airplanes.  She is due to fly to [place] to visit with her son.  She is asking for refill on her Xanax.  On review of her chart the last prescription was 3 years ago.    Hypertension  This is a chronic problem.  Patient came in requesting a change on her losartan.  She states her daughter who is a pharmacist warned her that it can cause elevated potassium and kidney damage.  Patient was worried about that.  On review of her labs are her potassium is always been normal.  She was changed to this as her amlodipine caused peripheral edema.  She has been on lisinopril in the past and also had adverse reactions to that.  After I discussed the medication with the patient she decided to stay on it.    Pancreatic cyst  This is a chronic problem.  Patient states she recently had a CAT scan on September 23 and never heard the results.  It was ordered by a different doctor.  I looked it up for her and told her that the pseudocyst was unchanged.    Pain in lower jaw  This is a chronic problem.  Patient states that she gets pain to the right TMJ area and just below the jaw.  She thinks is due to bad teeth.  She is due to see her dentist in December and has not mentioned this to them before.  She states it is not worse with hot or cold liquids.    Past Medical History:   Diagnosis Date    Anesthesia 1974/2002    PONV    Arthritis     Breast cancer (HCC)     Cancer (HCC) 2006    right breast     CATARACT     surgical correction bilateral    Dental disorder     bridge upper front; 5/7/2015 dental extraction with infection; temporary right lower    Diverticulitis     GERD (gastroesophageal reflux disease)     Heart burn 12/17/13    Hypertension     Indigestion     MEDICAL HOME 6/9/2015    Osteoporosis     Pain      chronic pain knees; ankles, feet, right shoulder, arm and hand    Personal history of venous thrombosis and embolism 12/31/2013    leg    Pneumonia 1984    PVD (peripheral vascular disease) (HCC)     Unspecified hemorrhagic conditions     had GI bleed diverticulitis       Social History     Tobacco Use    Smoking status: Never    Smokeless tobacco: Never   Vaping Use    Vaping Use: Never used   Substance Use Topics    Alcohol use: No    Drug use: No       Current Outpatient Medications Ordered in Epic   Medication Sig Dispense Refill    Acetaminophen (TYLENOL ARTHRITIS PAIN PO) Take  by mouth.      ALPRAZolam (XANAX) 0.25 MG Tab Take 1 Tablet by mouth 3 times a day as needed for Anxiety for up to 90 days. 20 Tablet 0    omeprazole (PRILOSEC) 20 MG delayed-release capsule Take 1 Capsule by mouth every day. 90 Capsule 3    losartan (COZAAR) 25 MG Tab Take 1 Tablet by mouth every day for 360 days. 90 Tablet 3    Horse Epes 300 MG Cap Take 1 Capsule by mouth 2 times a day.      latanoprost (XALATAN) 0.005 % Solution       Cholecalciferol (VITAMIN D) 125 MCG (5000 UT) Cap Take  by mouth.      KRILL OIL PO Take  by mouth.      estradiol (ESTRACE) 0.1 MG/GM vaginal cream Use 1 gram vaginally three times per week 42.5 g 5    Multiple Vitamin (MULTIVITAMIN PO) Take  by mouth.       No current Epic-ordered facility-administered medications on file.       Allergies:  Ibuprofen, Lisinopril, Morphine, and Other misc    Health Maintenance: Completed    ROS:  Gen: no fevers/chills, no changes in weight  Eyes: no changes in vision  ENT: no sore throat, no hearing loss, no bloody nose  Pulm: no sob, no cough  CV: no chest pain, no palpitations  GI: no nausea/vomiting, no diarrhea  : no dysuria  MSk: no myalgias  Skin: no rash  Neuro: no headaches, no numbness/tingling  Heme/Lymph: no easy bruising      Objective:       Exam:  /78 (BP Location: Right arm, Patient Position: Sitting, BP Cuff Size: Adult)   Pulse 82   Temp  "36.8 °C (98.2 °F) (Temporal)   Resp 18   Ht 1.651 m (5' 5\")   Wt 56.7 kg (125 lb)   SpO2 99%   BMI 20.80 kg/m²  Body mass index is 20.8 kg/m².    Gen: Alert and oriented, No apparent distress.  ENT:    Patient has no tenderness on palpation of the TMJ areas.  No swelling over the right TMJ area.  No discomfort with range of motion.  Examination of her teeth showed no obvious redness or inflammation.  Neck: Neck is supple without lymphadenopathy.  Ext: No clubbing, cyanosis, edema.  Psych: Patient is alert and oriented x3.  She is mildly anxious.  No unusual thought process expressed.  Insight and judgment is good.      Assessment & Plan:     82 y.o. female with the following -     1. Anxiety  This is a chronic problem.  I did renew her medication and told her to use it very sparingly as it can make her drowsy.  She is aware of this.  - ALPRAZolam (XANAX) 0.25 MG Tab; Take 1 Tablet by mouth 3 times a day as needed for Anxiety for up to 90 days.  Dispense: 20 Tablet; Refill: 0    2. Primary hypertension  This is a chronic problem under good control.  After discussion she wants to stay on the losartan.    3. Pancreatic cyst  This is a chronic problem.  Result discussed with patient.    4. Pain in lower jaw  This is a chronic problem.  She was encouraged to discuss it with her dentist at the next appointment to see if it is a dental issue versus TMJ.      Return in about 5 months (around 3/11/2023) for Long.    Please note that this dictation was created using voice recognition software. I have made every reasonable attempt to correct obvious errors, but I expect that there are errors of grammar and possibly content that I did not discover before finalizing the note.        "

## 2022-11-17 ASSESSMENT — ENCOUNTER SYMPTOMS
WEAKNESS: 0
DIZZINESS: 0
IRREGULAR HEARTBEAT: 0
ORTHOPNEA: 0
SHORTNESS OF BREATH: 0
PND: 0
PALPITATIONS: 0
DIARRHEA: 0
SYNCOPE: 0
FOCAL WEAKNESS: 0
WHEEZING: 0
NEAR-SYNCOPE: 0
ABDOMINAL PAIN: 0
NIGHT SWEATS: 0
NAUSEA: 0
VOMITING: 0
COUGH: 0
FEVER: 0
DYSPNEA ON EXERTION: 0

## 2022-11-18 ENCOUNTER — OFFICE VISIT (OUTPATIENT)
Dept: CARDIOLOGY | Facility: MEDICAL CENTER | Age: 82
End: 2022-11-18
Payer: MEDICARE

## 2022-11-18 VITALS
WEIGHT: 121 LBS | SYSTOLIC BLOOD PRESSURE: 158 MMHG | OXYGEN SATURATION: 98 % | HEART RATE: 92 BPM | DIASTOLIC BLOOD PRESSURE: 100 MMHG | RESPIRATION RATE: 14 BRPM | BODY MASS INDEX: 20.16 KG/M2 | HEIGHT: 65 IN

## 2022-11-18 DIAGNOSIS — I47.10 PAROXYSMAL SVT (SUPRAVENTRICULAR TACHYCARDIA) (HCC): ICD-10-CM

## 2022-11-18 DIAGNOSIS — I49.3 PVC'S (PREMATURE VENTRICULAR CONTRACTIONS): ICD-10-CM

## 2022-11-18 DIAGNOSIS — I10 ESSENTIAL HYPERTENSION: ICD-10-CM

## 2022-11-18 DIAGNOSIS — I49.1 PREMATURE ATRIAL CONTRACTION: ICD-10-CM

## 2022-11-18 PROCEDURE — 99214 OFFICE O/P EST MOD 30 MIN: CPT | Performed by: STUDENT IN AN ORGANIZED HEALTH CARE EDUCATION/TRAINING PROGRAM

## 2022-11-18 RX ORDER — AMLODIPINE BESYLATE 2.5 MG/1
2.5 TABLET ORAL DAILY
COMMUNITY
End: 2022-11-18 | Stop reason: SDUPTHER

## 2022-11-18 RX ORDER — AMLODIPINE BESYLATE 2.5 MG/1
2.5 TABLET ORAL DAILY
Qty: 90 TABLET | Refills: 3 | Status: SHIPPED | OUTPATIENT
Start: 2022-11-18 | End: 2023-12-08

## 2022-11-18 RX ORDER — LOSARTAN POTASSIUM 25 MG/1
25 TABLET ORAL DAILY
COMMUNITY
End: 2023-05-02

## 2022-11-18 ASSESSMENT — FIBROSIS 4 INDEX: FIB4 SCORE: 1.34

## 2022-11-18 NOTE — PATIENT INSTRUCTIONS
-Continue losartan 25mg daily  -Start amlodipine 2.5mg daily.    Measure BP at home.  Your home BP should be less than 130/80

## 2022-11-18 NOTE — PROGRESS NOTES
Cardiology Follow-up Consultation Note    Date of note:    11/18/2022    Primary Care Provider: Pancho Cole III, M.D.      Patient Name: Beth Fuller   YOB: 1940  MRN:              [id number]    Chief Complaint: Follow-up hypertension    History of Present Illness: Ms. Beth Fuller is a 82 y.o. female whose current medical problems include hypertension, paroxysmal SVT, PVCs, and left bundle branch block who is here for follow-up hypertension.    The patient was previously a patient of Dr. Wagner, last seen 11/24/2021.  The patient then followed with the BRAYAN Jaimes, on 4/14/2022.  The patient was doing well during the last visit, and no changes were made to her medications.    The patient presents today for follow-up.  The patient reports feeling well today without any symptoms.  She does report that her blood pressure at home has been more elevated since being switched from amlodipine to losartan.  The patient reports that amlodipine was switched to losartan due to ankle swelling.  However, after switching, she has not noticed any improvement in ankle swelling  but only seen a difference in terms of more elevated BP.  She would like to try amlodipine again.  She denies any orthopnea, or PND.  No palpitations.  No syncope or presyncopal episodes.      Cardiovascular Risk Factors:  1. Smoking status: Never smoker  2. Type II Diabetes Mellitus: Prediabetes, diet controlled  Lab Results   Component Value Date/Time    HBA1C 5.8 (H) 02/08/2016 03:46 PM     3. Hypertension: On medication  4. Dyslipidemia: None  Cholesterol,Tot   Date Value Ref Range Status   03/18/2022 180 100 - 199 mg/dL Final     LDL   Date Value Ref Range Status   03/18/2022 51 <100 mg/dL Final     HDL   Date Value Ref Range Status   03/18/2022 119 >=40 mg/dL Final     Triglycerides   Date Value Ref Range Status   03/18/2022 52 0 - 149 mg/dL Final     5. Family history of early Coronary Artery Disease in a first  degree relative (Male less than 55 years of age; Female less than 65 years of age): Heart disease in mother, maternal grandmother and maternal grandfather  6.  Obesity and/or Metabolic Syndrome: Body mass index is 20.14 kg/m².  7. Sedentary lifestyle: Not active    Review of Systems   Constitutional: Negative for fever, malaise/fatigue and night sweats.   Cardiovascular:  Negative for chest pain, dyspnea on exertion, irregular heartbeat, leg swelling, near-syncope, orthopnea, palpitations, paroxysmal nocturnal dyspnea and syncope.   Respiratory:  Negative for cough, shortness of breath and wheezing.    Gastrointestinal:  Negative for abdominal pain, diarrhea, nausea and vomiting.   Neurological:  Negative for dizziness, focal weakness and weakness.       All other systems reviewed and are negative.       Current Outpatient Medications   Medication Sig Dispense Refill    Acetaminophen (TYLENOL ARTHRITIS PAIN PO) Take  by mouth.      omeprazole (PRILOSEC) 20 MG delayed-release capsule Take 1 Capsule by mouth every day. 90 Capsule 3    losartan (COZAAR) 25 MG Tab Take 1 Tablet by mouth every day for 360 days. 90 Tablet 3    latanoprost (XALATAN) 0.005 % Solution       Cholecalciferol (VITAMIN D) 125 MCG (5000 UT) Cap Take  by mouth.      KRILL OIL PO Take  by mouth.      estradiol (ESTRACE) 0.1 MG/GM vaginal cream Use 1 gram vaginally three times per week 42.5 g 5    Multiple Vitamin (MULTIVITAMIN PO) Take  by mouth.      Horse [place] 300 MG Cap Take 1 Capsule by mouth 2 times a day. (Patient not taking: Reported on 11/18/2022)       No current facility-administered medications for this visit.         Allergies   Allergen Reactions    Ibuprofen      Stomach upset    Lisinopril      dizz    Morphine Vomiting    Other Misc      Metals: zinc oxide, vanadium chloride, manganese chloride       Physical Exam:  Ambulatory Vitals  BP (!) 158/100 (BP Location: Left arm, Patient Position: Sitting, BP Cuff Size: Adult)   Pulse  "92   Resp 14   Ht 1.651 m (5' 5\")   Wt 54.9 kg (121 lb)   SpO2 98%    Oxygen Therapy:  Pulse Oximetry: 98 %  BP Readings from Last 4 Encounters:   11/18/22 (!) 158/100   10/11/22 134/78   07/22/22 (!) 150/88   05/20/22 132/82       Weight/BMI: Body mass index is 20.14 kg/m².  Wt Readings from Last 4 Encounters:   11/18/22 54.9 kg (121 lb)   10/11/22 56.7 kg (125 lb)   07/22/22 56 kg (123 lb 6.4 oz)   05/20/22 54.4 kg (120 lb)         General: Well appearing and in no apparent distress  Eyes: nl conjunctiva, no icteric sclera  ENT: wearing a mask, normal external appearance of ears  Neck: no visible JVP,  no carotid bruits  Lungs: normal respiratory effort, CTAB  Heart: RRR, no murmurs, no rubs or gallops,  no edema bilateral lower extremities. No LV/RV heave on cardiac palpatation. + bilateral radial pulses.  + bilateral dp pulses.   Abdomen: soft, non tender, non distended, no masses, normal bowel sounds.  No HSM.  Extremities/MSK: no clubbing, no cyanosis  Neurological: No focal sensory deficits  Psychiatric: Appropriate affect, A/O x 3, intact judgement and insight  Skin: Warm extremities      Lab Data Review:  Lab Results   Component Value Date/Time    CHOLSTRLTOT 180 03/18/2022 08:56 AM    LDL 51 03/18/2022 08:56 AM     03/18/2022 08:56 AM    TRIGLYCERIDE 52 03/18/2022 08:56 AM       Lab Results   Component Value Date/Time    SODIUM 140 03/18/2022 08:56 AM    POTASSIUM 4.7 03/18/2022 08:56 AM    CHLORIDE 102 03/18/2022 08:56 AM    CO2 27 03/18/2022 08:56 AM    GLUCOSE 105 (H) 03/18/2022 08:56 AM    BUN 18 09/10/2022 08:19 AM    CREATININE 0.78 09/10/2022 08:19 AM     Lab Results   Component Value Date/Time    ALKPHOSPHAT 87 03/18/2022 08:56 AM    ASTSGOT 14 03/18/2022 08:56 AM    ALTSGPT 11 03/18/2022 08:56 AM    TBILIRUBIN 0.6 03/18/2022 08:56 AM      Lab Results   Component Value Date/Time    WBC 4.3 (L) 03/18/2022 08:56 AM     Lab Results   Component Value Date/Time    HBA1C 5.8 (H) 02/08/2016 " 03:46 PM         Cardiac Imaging and Procedures Review:    EKG dated 10/27/2021: My personal interpretation is sinus rhythm, PACs, first-degree AV block, left bundle branch block    Echo dated 2022:   CONCLUSIONS  The left ventricular ejection fraction is visually estimated to be 65%.  Mild concentric left ventricular hypertrophy, only 1 cm.  Normal left atrial size.  Trivial aortic valve sclerosis without significant stenosis.  Only mild mitral/aortic regurgitation.  Estimated right ventricular systolic pressure is 25 mmHg.     Compared to the prior echo on 14, no significant change.    Holter Monitor (2021):   [facility]     Test Date:  2021   Pt Name:    BENJAMIN SOW               Department: [facility]   MRN:        [id number]                      Room:   Gender:     Female                       Technician: Sridhar   :        [id number]                   Requested By:MARIETTA WAGNER   Order #:    [id number]                    Reading MD: Marietta Wagner       Interpretive Statements   *   Monitoring started at 11:20 AM and continued for 24 hours. The overall   rhythm was Sinus with a LBBB. The average heart   rate was 75 BPM. The minimum heart rate was 54 BPM, occurring at 3:32:34 PM.   The maximum heart rate was 120 BPM,   occurring at 4:36:09 PM. The longest R-R interval was 1.7 seconds occurring   at 6:42:04 AM.     *   Ventricular ectopic activity consisted of 2 runs, 2 triplets, 171   couplets, 8348 single, multifocal PVCs, 4 interpolated PVCs,   621 single endiastolic beats, 602 in bigeminy, 424 in trigeminy. The longest   ventricular run occurred at 3:40:04 PM, consisting   of 4 beats, with maximum heart rate of 90 BPM. The fastest ventricular run   occurred at 4:31:26 PM, consisting of 4 beats, with   maximum heart rate of 90 BPM.     *   The patient's rhythm included 14 minutes 19 seconds of bradycardia. The   slowest single episode of bradycardia occurred at   11:58:05  AM, lasting 11 seconds, with minimum heart rate of 57 BPM.     *   The patient's rhythm included 3 minutes 56 seconds of tachycardia. The   fastest single episode of tachycardia occurred at   4:35:14 PM, lasting 1 minute 41 seconds, with maximum heart rate of 120 BPM.     *   Supraventricular ectopic activity consisted of 2 runs, 14 were in atrial   couplets, 94 late beats, 2081 single PACs, 42 in   bigeminy, 499 in trigeminy. The longest supraventricular run occurred at   8:39:03 AM consisting of 4 beats, with maximum heart   rate of 130 BPM. The fastest supraventricular run occurred at 4:37:34 PM,   consisting of 3 beats, with maximum heart rate of   176 BPM.     24 Hour Holter Summary 1-4-21:   Sinus, LBBB, normal heart rate range, no pauses.   Frequent PVCs, 9.7% burden, 10,355/24 hrs.   Occasional bigeminy, couplets.   Occasional PACs, 4 beat atrial run.   No sustained rhythm changes.   No symptoms reported.       Assessment & Plan     1. Paroxysmal SVT (supraventricular tachycardia) (HCC)        2. Premature atrial contraction        3. PVC's (premature ventricular contractions)        4. Essential hypertension              Shared Medical Decision Making:    Paroxysmal SVT  PACs  PVCs  Echocardiogram showed normal LVEF, trivial aortic sclerosis and mild MR/AI  -Asymptomatic    Hypertension  BP elevated this visit  -Continue losartan 25 mg daily  -Start amlodipine 2.5mg daily (initially thought to be causing ankle swelling, which did not improve with stopping and patient would like to try it again)  -Counseled the patient to measure BP at home.  If home BP remains elevated above 130/80, can consider starting hydrochlorothiazide.    All of the patient's excellent questions were answered to the best of my knowledge and to her satisfaction.  It was a pleasure seeing Ms. Beth Fuller in my clinic today. No follow-ups on file. Patient is aware to call the cardiology clinic with any questions or  concerns.      Hilary Boyer MD  [facility]  [facility], [address]  [address] [address]  [place] [address]  Phone: [phone]  Fax: [phone]

## 2022-11-21 ENCOUNTER — TELEPHONE (OUTPATIENT)
Dept: HEALTH INFORMATION MANAGEMENT | Facility: OTHER | Age: 82
End: 2022-11-21

## 2022-12-14 ENCOUNTER — HOSPITAL ENCOUNTER (OUTPATIENT)
Dept: LAB | Facility: MEDICAL CENTER | Age: 82
End: 2022-12-14
Payer: MEDICARE

## 2022-12-14 LAB
ALBUMIN SERPL BCP-MCNC: 4 G/DL (ref 3.2–4.9)
ALBUMIN/GLOB SERPL: 1.3 G/DL
ALP SERPL-CCNC: 73 U/L (ref 30–99)
ALT SERPL-CCNC: 19 U/L (ref 2–50)
ANION GAP SERPL CALC-SCNC: 9 MMOL/L (ref 7–16)
AST SERPL-CCNC: 16 U/L (ref 12–45)
BASOPHILS # BLD AUTO: 0.7 % (ref 0–1.8)
BASOPHILS # BLD: 0.04 K/UL (ref 0–0.12)
BILIRUB SERPL-MCNC: 0.3 MG/DL (ref 0.1–1.5)
BUN SERPL-MCNC: 23 MG/DL (ref 8–22)
CALCIUM ALBUM COR SERPL-MCNC: 9.6 MG/DL (ref 8.5–10.5)
CALCIUM SERPL-MCNC: 9.6 MG/DL (ref 8.5–10.5)
CHLORIDE SERPL-SCNC: 102 MMOL/L (ref 96–112)
CO2 SERPL-SCNC: 27 MMOL/L (ref 20–33)
CREAT SERPL-MCNC: 0.9 MG/DL (ref 0.5–1.4)
EOSINOPHIL # BLD AUTO: 0.05 K/UL (ref 0–0.51)
EOSINOPHIL NFR BLD: 0.9 % (ref 0–6.9)
ERYTHROCYTE [DISTWIDTH] IN BLOOD BY AUTOMATED COUNT: 45.7 FL (ref 35.9–50)
GFR SERPLBLD CREATININE-BSD FMLA CKD-EPI: 63 ML/MIN/1.73 M 2
GLOBULIN SER CALC-MCNC: 3.1 G/DL (ref 1.9–3.5)
GLUCOSE SERPL-MCNC: 124 MG/DL (ref 65–99)
HCT VFR BLD AUTO: 34.9 % (ref 37–47)
HGB BLD-MCNC: 11.4 G/DL (ref 12–16)
IMM GRANULOCYTES # BLD AUTO: 0.01 K/UL (ref 0–0.11)
IMM GRANULOCYTES NFR BLD AUTO: 0.2 % (ref 0–0.9)
LYMPHOCYTES # BLD AUTO: 0.44 K/UL (ref 1–4.8)
LYMPHOCYTES NFR BLD: 8.1 % (ref 22–41)
MCH RBC QN AUTO: 30.7 PG (ref 27–33)
MCHC RBC AUTO-ENTMCNC: 32.7 G/DL (ref 33.6–35)
MCV RBC AUTO: 94.1 FL (ref 81.4–97.8)
MONOCYTES # BLD AUTO: 0.51 K/UL (ref 0–0.85)
MONOCYTES NFR BLD AUTO: 9.4 % (ref 0–13.4)
NEUTROPHILS # BLD AUTO: 4.38 K/UL (ref 2–7.15)
NEUTROPHILS NFR BLD: 80.7 % (ref 44–72)
NRBC # BLD AUTO: 0 K/UL
NRBC BLD-RTO: 0 /100 WBC
PLATELET # BLD AUTO: 287 K/UL (ref 164–446)
PMV BLD AUTO: 10.6 FL (ref 9–12.9)
POTASSIUM SERPL-SCNC: 4.6 MMOL/L (ref 3.6–5.5)
PROT SERPL-MCNC: 7.1 G/DL (ref 6–8.2)
RBC # BLD AUTO: 3.71 M/UL (ref 4.2–5.4)
SODIUM SERPL-SCNC: 138 MMOL/L (ref 135–145)
WBC # BLD AUTO: 5.4 K/UL (ref 4.8–10.8)

## 2022-12-14 PROCEDURE — 82784 ASSAY IGA/IGD/IGG/IGM EACH: CPT

## 2022-12-14 PROCEDURE — 85025 COMPLETE CBC W/AUTO DIFF WBC: CPT

## 2022-12-14 PROCEDURE — 86364 TISS TRNSGLTMNASE EA IG CLAS: CPT

## 2022-12-14 PROCEDURE — 36415 COLL VENOUS BLD VENIPUNCTURE: CPT

## 2022-12-14 PROCEDURE — 80053 COMPREHEN METABOLIC PANEL: CPT

## 2022-12-16 ENCOUNTER — HOSPITAL ENCOUNTER (OUTPATIENT)
Facility: MEDICAL CENTER | Age: 82
End: 2022-12-16
Payer: MEDICARE

## 2022-12-16 LAB
IGA SERPL-MCNC: 230 MG/DL (ref 68–408)
LACTOFERRIN STL QL IA: POSITIVE

## 2022-12-16 PROCEDURE — 83993 ASSAY FOR CALPROTECTIN FECAL: CPT

## 2022-12-16 PROCEDURE — 82653 EL-1 FECAL QUANTITATIVE: CPT

## 2022-12-16 PROCEDURE — 87899 AGENT NOS ASSAY W/OPTIC: CPT

## 2022-12-16 PROCEDURE — 87328 CRYPTOSPORIDIUM AG IA: CPT

## 2022-12-16 PROCEDURE — 87045 FECES CULTURE AEROBIC BACT: CPT

## 2022-12-16 PROCEDURE — 87329 GIARDIA AG IA: CPT

## 2022-12-16 PROCEDURE — 83630 LACTOFERRIN FECAL (QUAL): CPT

## 2022-12-16 PROCEDURE — 82705 FATS/LIPIDS FECES QUAL: CPT

## 2022-12-17 LAB
E COLI SXT1+2 STL IA: NORMAL
FAT STL QL: NORMAL
G LAMBLIA+C PARVUM AG STL QL RAPID: NORMAL
NEUTRAL FAT STL QL: NORMAL
SIGNIFICANT IND 70042: NORMAL
SIGNIFICANT IND 70042: NORMAL
SITE SITE: NORMAL
SITE SITE: NORMAL
SOURCE SOURCE: NORMAL
SOURCE SOURCE: NORMAL
TTG IGA SER IA-ACNC: <2 U/ML (ref 0–3)

## 2022-12-18 LAB
BACTERIA STL CULT: NORMAL
C JEJUNI+C COLI AG STL QL: NORMAL
E COLI SXT1+2 STL IA: NORMAL
SIGNIFICANT IND 70042: NORMAL
SITE SITE: NORMAL
SOURCE SOURCE: NORMAL

## 2022-12-20 LAB
CALPROTECTIN STL-MCNT: 337 UG/G
ELASTASE PANC STL-MCNT: 468 UG/G

## 2023-04-05 DIAGNOSIS — M15.9 PRIMARY OSTEOARTHRITIS INVOLVING MULTIPLE JOINTS: ICD-10-CM

## 2023-04-05 RX ORDER — CELECOXIB 100 MG/1
100 CAPSULE ORAL 2 TIMES DAILY
Qty: 180 CAPSULE | Refills: 3 | OUTPATIENT
Start: 2023-04-05

## 2023-04-06 ENCOUNTER — TELEPHONE (OUTPATIENT)
Dept: MEDICAL GROUP | Facility: PHYSICIAN GROUP | Age: 83
End: 2023-04-06
Payer: MEDICARE

## 2023-04-06 RX ORDER — CELECOXIB 100 MG/1
100 CAPSULE ORAL 2 TIMES DAILY
Qty: 180 CAPSULE | Refills: 3 | Status: ON HOLD
Start: 2023-04-06 | End: 2024-01-04

## 2023-04-06 NOTE — TELEPHONE ENCOUNTER
----- Message from Pancho Cole III, M.D. sent at 4/5/2023  4:12 PM PDT -----  Please call patient and tell her I got a request for refill her Celebrex prescription.  It is no longer on her active medication list.  Ask her if she is still taking that and if it is helpful for her then I can refill it.  If she is not taking it then we will ignore the request.    Dr. Cole

## 2023-04-06 NOTE — TELEPHONE ENCOUNTER
Patient stated that she does still atke this medication she takes 2 a day and it helps a lot with her pain.

## 2023-04-25 ENCOUNTER — DOCUMENTATION (OUTPATIENT)
Dept: VASCULAR LAB | Facility: MEDICAL CENTER | Age: 83
End: 2023-04-25
Payer: MEDICARE

## 2023-04-25 RX ORDER — ESTRADIOL 0.1 MG/G
CREAM VAGINAL
Qty: 127.5 G | Refills: 1 | Status: SHIPPED | OUTPATIENT
Start: 2023-04-25

## 2023-04-25 RX ORDER — LOSARTAN POTASSIUM 25 MG/1
25 TABLET ORAL DAILY
Qty: 30 TABLET | OUTPATIENT
Start: 2023-04-25

## 2023-04-25 NOTE — TELEPHONE ENCOUNTER
Received request via: Pharmacy    Was the patient seen in the last year in this department? Yes    Does the patient have an active prescription (recently filled or refills available) for medication(s) requested? No    Does the patient have long-term Plus and need 100 day supply (blood pressure, diabetes and cholesterol meds only)? Medication is not for cholesterol, blood pressure or diabetes   same name as above

## 2023-04-26 PROBLEM — M18.11 ARTHRITIS OF CARPOMETACARPAL (CMC) JOINT OF RIGHT THUMB: Status: ACTIVE | Noted: 2023-04-26

## 2023-04-26 PROBLEM — M19.031 OSTEOARTHRITIS OF RIGHT WRIST: Status: ACTIVE | Noted: 2023-04-26

## 2023-04-26 NOTE — PROGRESS NOTES
Refill request needs a F/U appt with Vascular Medicine.  Spoke with patient, patient will defer to PCP for the refill for Losartan.

## 2023-04-27 DIAGNOSIS — K21.9 GASTROESOPHAGEAL REFLUX DISEASE WITHOUT ESOPHAGITIS: ICD-10-CM

## 2023-04-27 RX ORDER — OMEPRAZOLE 20 MG/1
20 CAPSULE, DELAYED RELEASE ORAL
Qty: 90 CAPSULE | Refills: 3 | Status: CANCELLED | OUTPATIENT
Start: 2023-04-27

## 2023-04-27 NOTE — TELEPHONE ENCOUNTER
Patient requested refill on omeprazole. She also called ask whether she should be taking the losartan

## 2023-04-28 ENCOUNTER — TELEPHONE (OUTPATIENT)
Dept: HEALTH INFORMATION MANAGEMENT | Facility: OTHER | Age: 83
End: 2023-04-28
Payer: MEDICARE

## 2023-04-28 DIAGNOSIS — K21.9 GASTROESOPHAGEAL REFLUX DISEASE WITHOUT ESOPHAGITIS: ICD-10-CM

## 2023-04-28 RX ORDER — OMEPRAZOLE 20 MG/1
20 CAPSULE, DELAYED RELEASE ORAL
Qty: 90 CAPSULE | Refills: 3 | Status: SHIPPED | OUTPATIENT
Start: 2023-04-28

## 2023-05-02 RX ORDER — LOSARTAN POTASSIUM 25 MG/1
TABLET ORAL
Qty: 100 TABLET | Refills: 1 | Status: SHIPPED | OUTPATIENT
Start: 2023-05-02

## 2023-05-19 ENCOUNTER — OFFICE VISIT (OUTPATIENT)
Dept: VASCULAR LAB | Facility: MEDICAL CENTER | Age: 83
End: 2023-05-19
Payer: MEDICARE

## 2023-05-19 VITALS
HEART RATE: 77 BPM | WEIGHT: 126 LBS | BODY MASS INDEX: 22.32 KG/M2 | HEIGHT: 63 IN | DIASTOLIC BLOOD PRESSURE: 70 MMHG | SYSTOLIC BLOOD PRESSURE: 123 MMHG

## 2023-05-19 DIAGNOSIS — Z86.718 HISTORY OF DVT (DEEP VEIN THROMBOSIS): ICD-10-CM

## 2023-05-19 DIAGNOSIS — I87.2 CHRONIC VENOUS INSUFFICIENCY: ICD-10-CM

## 2023-05-19 DIAGNOSIS — I10 PRIMARY HYPERTENSION: ICD-10-CM

## 2023-05-19 DIAGNOSIS — I70.0 ABDOMINAL AORTIC ATHEROSCLEROSIS (HCC): ICD-10-CM

## 2023-05-19 PROCEDURE — 3074F SYST BP LT 130 MM HG: CPT

## 2023-05-19 PROCEDURE — 3078F DIAST BP <80 MM HG: CPT

## 2023-05-19 PROCEDURE — 99212 OFFICE O/P EST SF 10 MIN: CPT

## 2023-05-19 PROCEDURE — 99214 OFFICE O/P EST MOD 30 MIN: CPT

## 2023-05-19 RX ORDER — GABAPENTIN 100 MG/1
100 CAPSULE ORAL
COMMUNITY
Start: 2023-05-09

## 2023-05-19 ASSESSMENT — ENCOUNTER SYMPTOMS
FEVER: 0
HEADACHES: 0
WHEEZING: 0
PALPITATIONS: 0
SPEECH CHANGE: 0
SHORTNESS OF BREATH: 0
WEAKNESS: 0
FOCAL WEAKNESS: 0
DIZZINESS: 0
BRUISES/BLEEDS EASILY: 0
CHILLS: 0
ORTHOPNEA: 0
CLAUDICATION: 0
MYALGIAS: 0
PND: 0
COUGH: 0
BLOOD IN STOOL: 1

## 2023-05-19 ASSESSMENT — FIBROSIS 4 INDEX: FIB4 SCORE: 1.09

## 2023-05-19 NOTE — PROGRESS NOTES
"Follow UP VASCULAR MEDICINE VISIT  Beth Fuller is a 83 y.o. female  who presents today 05/19/2023  for   Chief Complaint   Patient presents with    Follow-Up     initially referred by Pancho Cole III, MD for eval and med mgmt of CVI and associated symptoms      Subjective       Last seen 7/22/2022    Chronic venous insufficiency:  Current/Interval hx:  Swelling in both ankles, seems to be worsening over last 6 months,   wearing compression stockings daily,   Cardiology restarted amlodipine as pt did not think swelling changed with stopping.    Has been taking horse chestnut seed for last 6 months, does not notice any difference in symptoms  Has not completed imaging   Wants to know if she can have \"stripping\" or \"lasering\" done to help legs  Symptoms: worsening ankle swelling bilaterally, now having some pain to right ankle with walking, no skin ulcerations, no claudication  Swelling decreases minimally at night  Age of onset: 20-30s   Hx of known VTE:  2014 - LLE gastro v DVT, R GSV SVT.  Had preg-associate VTE in the distant past.  Had R TKA - placed in 2013 on DOAC.  L TKA 2015 - no clots.  No current OAC  Prior imaging or work-up: 2014 - duplex scan  Prior interventions:     Compression stockings: thigh-high 20-30mmHg   Medications/phlebotonics: none    Procedural interventions:  Multiple vein stripping, [place] in [place]     HTN:  No current concerns, stable, tolerating meds.   Has been back on on amlodipine for >6mo, as cardiology restarted  Home BP log: not routinely taking, good in other offices    Hyperlipidemia:No current medications     Antiplatelet/anticoagulation: No    CKD:  No     Sleeping disorder/MILLICENT: No     Hypothyroidism: No            Current Outpatient Medications:     losartan, TAKE ONE TABLET BY MOUTH EVERY DAY, Taking    omeprazole, 20 mg, Oral, QDAY, Taking    estradiol, USE 1 GRAM VAGINALLY 3 TIMES A WEEK, Taking    celecoxib, 100 mg, Oral, BID, Taking    amLODIPine, 2.5 mg, " "Oral, DAILY, Taking    Acetaminophen (TYLENOL ARTHRITIS PAIN PO), Take  by mouth., PRN    latanoprost, , Taking    Vitamin D, Take  by mouth., Taking    KRILL OIL PO, Take  by mouth., Taking    Multiple Vitamin (MULTIVITAMIN PO), Take  by mouth., Taking    gabapentin,    Allergies   Allergen Reactions    Ibuprofen      Stomach upset    Lisinopril      dizz    Morphine Vomiting    Other Misc      Metals: zinc oxide, vanadium chloride, manganese chloride        Social History     Tobacco Use    Smoking status: Never    Smokeless tobacco: Never   Vaping Use    Vaping Use: Never used   Substance Use Topics    Alcohol use: No    Drug use: No     Review of Systems   Constitutional:  Negative for chills, fever and malaise/fatigue.   Respiratory:  Negative for cough, shortness of breath and wheezing.    Cardiovascular:  Positive for leg swelling. Negative for chest pain, palpitations, orthopnea, claudication and PND.   Gastrointestinal:  Positive for blood in stool (from diverticulitis, no current symptoms).   Musculoskeletal:  Negative for myalgias.   Neurological:  Negative for dizziness, speech change, focal weakness, weakness and headaches.   Endo/Heme/Allergies:  Does not bruise/bleed easily.           Objective   Vitals:    05/19/23 0938   BP: 123/70   BP Location: Left arm   Patient Position: Sitting   BP Cuff Size: Small adult   Pulse: 77   Weight: 57.2 kg (126 lb)   Height: 1.6 m (5' 3\")        BP Readings from Last 5 Encounters:   05/19/23 123/70   11/18/22 (!) 158/100   10/11/22 134/78   07/22/22 (!) 150/88   05/20/22 132/82      Body mass index is 22.32 kg/m².  Physical Exam  Vitals reviewed.   Constitutional:       General: She is not in acute distress.     Appearance: Normal appearance. She is not diaphoretic.   HENT:      Head: Normocephalic and atraumatic.   Eyes:      General: Lids are normal. No scleral icterus.     Conjunctiva/sclera: Conjunctivae normal.   Cardiovascular:      Rate and Rhythm: Normal " rate and regular rhythm.      Pulses:           Posterior tibial pulses are 1+ on the right side and 1+ on the left side.      Heart sounds: Normal heart sounds.      Comments: Stemmer's sign - negative bilat   Spider telangectasia:       RLE:  Diffuse       LLE: diffuse   Varicosities:           RLE: mixed     LLE: mixed   Corona phlebectatica:      RLE:  mild-mod       LLE:  Moderate   Cording:         RLE:  None     LLE: None     Pulmonary:      Effort: Pulmonary effort is normal. No respiratory distress.      Breath sounds: Normal breath sounds. No wheezing.   Musculoskeletal:      Right lower le+ Pitting Edema present.      Left lower le+ Pitting Edema (distal ankle to foot only, nontender) present.   Skin:     General: Skin is warm and dry.      Capillary Refill: Capillary refill takes less than 2 seconds.      Coloration: Skin is not cyanotic.      Nails: There is no clubbing.          Neurological:      General: No focal deficit present.      Mental Status: She is alert and oriented to person, place, and time. Mental status is at baseline.      Coordination: Coordination is intact. Coordination normal.      Gait: Gait is intact. Gait normal.   Psychiatric:         Mood and Affect: Mood normal.         Behavior: Behavior normal.       Lab Results   Component Value Date    CHOLSTRLTOT 180 2022    LDL 51 2022     2022    TRIGLYCERIDE 52 2022      No results found for: LIPOPROTA   No results found for: APOB             Lab Results   Component Value Date    SODIUM 138 2022    POTASSIUM 4.6 2022    CHLORIDE 102 2022    CO2 27 2022    GLUCOSE 124 (H) 2022    BUN 23 (H) 2022    CREATININE 0.90 2022        Lab Results   Component Value Date    WBC 5.4 2022    RBC 3.71 (L) 2022    HEMOGLOBIN 11.4 (L) 2022    HEMATOCRIT 34.9 (L) 2022    MCV 94.1 2022    MCH 30.7 2022    MCHC 32.7 (L) 2022    MPV  10.6 2022         Lab Results   Component Value Date/Time    MALBCRT 28 2018 07:53 AM    MICROALBUR 1.9 2018 07:53 AM        VASCULAR IMAGING:   Last EKG:   Results for orders placed or performed in visit on 22   Cardiac Event Monitor   Result Value Ref Range    Report       [facility]    Test Date:  2021  Pt Name:    BENJAMIN SOW               Department: [facility]  MRN:        [id number]                      Room:  Gender:     Female                       Technician: Sridhar  :        [id number]                   Requested By:DESIREE WAGNER  Order #:    [id number]                    Reading MD: Desiree Wagner      Interpretive Statements  *   Monitoring started at 11:20 AM and continued for 24 hours. The overall  rhythm was Sinus with a LBBB. The average heart  rate was 75 BPM. The minimum heart rate was 54 BPM, occurring at 3:32:34 PM.  The maximum heart rate was 120 BPM,  occurring at 4:36:09 PM. The longest R-R interval was 1.7 seconds occurring  at 6:42:04 AM.    *   Ventricular ectopic activity consisted of 2 runs, 2 triplets, 171  couplets, 8348 single, multifocal PVCs, 4 interpolated PVCs,  621 single endiastolic beats, 602 in bigeminy, 424 in trigeminy. The longest  ventricular run occurred at 3:40:04 PM, consisting  of 4 pat ts, with maximum heart rate of 90 BPM. The fastest ventricular run  occurred at 4:31:26 PM, consisting of 4 beats, with  maximum heart rate of 90 BPM.    *   The patient's rhythm included 14 minutes 19 seconds of bradycardia. The  slowest single episode of bradycardia occurred at  11:58:05 AM, lasting 11 seconds, with minimum heart rate of 57 BPM.    *   The patient's rhythm included 3 minutes 56 seconds of tachycardia. The  fastest single episode of tachycardia occurred at  4:35:14 PM, lasting 1 minute 41 seconds, with maximum heart rate of 120 BPM.    *   Supraventricular ectopic activity consisted of 2 runs, 14 were in atrial  couplets,  94 late beats, 2081 single PACs, 42 in  bigeminy, 499 in trigeminy. The longest supraventricular run occurred at  8:39:03 AM consisting of 4 beats, with maximum heart  rate of 130 BPM. The fastest supraventricular run occurred at 4:37:34 PM,  consisting of 3 beats, with maximum heart rate of  176 BPM.    24 Hour Holter Summary 1-4-21:  Sinus, LBBB, n ormal heart rate range, no pauses.  Frequent PVCs, 9.7% burden, 10,355/24 hrs.  Occasional bigeminy, couplets.  Occasional PACs, 4 beat atrial run.  No sustained rhythm changes.  No symptoms reported.    Electronically Signed On 1-4-2022 15:37:36 PST by Marietta Wagner       BLE venous 2014  1.  No definitive evidence of Right  lower extremity deep venous thrombosis.   2.  There is a partially occlusive thrombus in the left proximal gastrocnemius vein. This can be considered a deep vein.   3. There is a superficial partial thrombus in the right lesser saphenous vein in the posterior calf.   4. Complex left popliteal fluid collection which could be a popliteal cyst or postoperative seroma/resolving hematoma.   5. Minimal fluid collection in the right groin possibly from previous line placement or surgery.    BLE 6/2015   1.  Normal right lower extremity deep venous examination.    2.  Surgical absence of the left greater saphenous vein.    RLE venous 2015  No evidence of right lower extremity deep venous thrombosis.    Echo 2/2022  The left ventricular ejection fraction is visually estimated to be 65%.  Mild concentric left ventricular hypertrophy, only 1 cm.  Normal left atrial size.  Trivial aortic valve sclerosis without significant stenosis.  Only mild mitral/aortic regurgitation.  Estimated right ventricular systolic pressure is 25 mmHg.   Compared to the prior echo on 03/21/14, no significant change.    CT abd 2020  AORTA and VASCULATURE: shows atherosclerosis without aneurysm.         Medical Decision Making:  Today's Assessment / Status / Plan:     1. Chronic  "venous insufficiency        2. Primary hypertension        3. History of DVT (deep vein thrombosis)        4. 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 / Pr,o presumed  R CEAP:  C3aA / Ep,s / A? / P ?    likely exacerbated by COX2i,   CCB previously stopped without change to LE swelling- unlikely a contributor swelling  reviewed anatomy, pathophys and gave educ handouts regarding CVI, common s/s, possible complications, and tx options - as reviewed previously  Plan:  - reduce or stop celebrex - use tylenol an/carlos topical diclofenac - reviewed  - check venous reflux duplex to eval for possible options for interventions - reviewed, ordered previously, not completed.  Given scheduling number to schedule.  Will review imaging at next appt for possible intervention  - continue thigh-high compression socks, 20-30mmHg, as much as tolerated, reviewed [email or web address] and sizing processes   - increase walking, avoid prolonged standing   - elevated legs while sitting and sleeping above heart level  - reduce sodium (\"salt\") in diet to less than 2,000mg daily   - continue daily moisturizing lotion (such as Gold Bond Diabetic Foot Cream)  -consider PT evaluation for pumps or therapy in future for symptom management  Medications:    - continue horse chestnut seed extract 300mg 2 times daily, no noted improvement, can trial holding for a couple weeks to see if notice any change in symptoms.  Restart if symptoms worsen.   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    Plan:  - imaging as per CVI above   - see antithrombotic plan below     3) abd ao athero - no prior aneurysm  - no further suveillance, continue med mgmt     BLOOD PRESSURE MANAGEMENT:  ACC/AHA (2017) goal <130/80  Home BP at goal:  unknown, not checking  Office BP at goal:  yes  24h ABPM: not ordered to date  Mild LVH on echo indicative of long-term HTN   Plan:   Monitoring:   - start/continue home BP monitoring, reviewed correct technique, provide BP log and instructions  - order 24h ABPM:  NO  - monitor lytes/gfr routinely as per PCP  - contact office if BP consistently >140/>90 for discussion of tx adjustments   - defer further treatment and management to PCP/cardiology  Medications:  - continue amlodipine 2.5mg as reordered by cardiology and PCP, -consider even though low dose, may still contribute to LE edema, pt aware  - continue losartan 25mg daily     LIPID MANAGEMENT:   Qualifies for Statin Therapy Based on 2018 ACC/AHA Guidelines: no  The ASCVD Risk score (Arsalan DK, et al., 2019) failed to calculate.  Major ASCVD events: None  High-risk conditions: None   Risk-enhancers: N/A  Currently on Statin: No  Tx goals: LDL-C <100 (consider non-HDL-C <130, apoB <90)  At goal? N/A  Plan:   - reinforced ongoing TLC measures as noted   - defer lab surveillance to PCP   Meds: none at this time     ANTITHROMBOTIC/ANTIPLATELET THERAPY: not indicated at this time     GLYCEMIC STATUS: Prediabetic, possible IFG, no a1c available     LIFESTYLE INTERVENTIONS:    SMOKING:   reports that she has never smoked. She has never used smokeless tobacco.   - continued complete avoidance of all tobacco products     PHYSICAL ACTIVITY: continue healthy activity to improve CV fitness.  In general, targeting >150min/week of moderate-level activity.  Continue  excellent fitness program     WEIGHT MANAGEMENT AND NUTRITION:  DASH style dietary approach , Focus on reduced sodium to <2,000mg per day  and Provide specific dietary approach handouts    OTHER:    # pancreatic cyst- stable, defer to PCP and GI for surveillance     # hx of COVID - 2nd episode - feeling better, no long sx     Instructed to follow-up with PCP for remainder of adult medical needs: yes  We will partner with other providers in the management of established vascular disease and cardiometabolic risk factors.    Studies to Be Obtained: BLE VR duplex  - reviewed, given scheduling number  Labs to Be Obtained: none, as per PCP    Follow up in: 3 months after imaging to review options    JUAN DAVID Diaen.  [facility]   [facility]   [phone]

## 2023-05-19 NOTE — PROGRESS NOTES
Pt called stating that she is unable to get her US done prior to order expiration - placed updated order accordingly.    Eric Peterson, SurinderD, BCACP

## 2023-05-26 ENCOUNTER — APPOINTMENT (OUTPATIENT)
Dept: CARDIOLOGY | Facility: MEDICAL CENTER | Age: 83
End: 2023-05-26
Payer: MEDICARE

## 2023-06-16 ENCOUNTER — HOSPITAL ENCOUNTER (OUTPATIENT)
Dept: RADIOLOGY | Facility: MEDICAL CENTER | Age: 83
End: 2023-06-16
Attending: FAMILY MEDICINE
Payer: MEDICARE

## 2023-06-16 DIAGNOSIS — Z86.718 HISTORY OF DVT (DEEP VEIN THROMBOSIS): ICD-10-CM

## 2023-06-16 PROCEDURE — 93970 EXTREMITY STUDY: CPT | Mod: 26 | Performed by: INTERNAL MEDICINE

## 2023-06-16 PROCEDURE — 93970 EXTREMITY STUDY: CPT

## 2023-06-27 ENCOUNTER — OFFICE VISIT (OUTPATIENT)
Dept: MEDICAL GROUP | Facility: PHYSICIAN GROUP | Age: 83
End: 2023-06-27
Payer: MEDICARE

## 2023-06-27 VITALS
HEART RATE: 76 BPM | RESPIRATION RATE: 18 BRPM | DIASTOLIC BLOOD PRESSURE: 74 MMHG | WEIGHT: 132.4 LBS | TEMPERATURE: 98.7 F | SYSTOLIC BLOOD PRESSURE: 126 MMHG | BODY MASS INDEX: 23.46 KG/M2 | OXYGEN SATURATION: 98 % | HEIGHT: 63 IN

## 2023-06-27 DIAGNOSIS — I87.2 CHRONIC VENOUS INSUFFICIENCY: ICD-10-CM

## 2023-06-27 DIAGNOSIS — K59.1 FUNCTIONAL DIARRHEA: ICD-10-CM

## 2023-06-27 DIAGNOSIS — M81.0 AGE-RELATED OSTEOPOROSIS WITHOUT CURRENT PATHOLOGICAL FRACTURE: ICD-10-CM

## 2023-06-27 PROBLEM — R68.84 PAIN IN LOWER JAW: Status: RESOLVED | Noted: 2022-10-11 | Resolved: 2023-06-27

## 2023-06-27 PROCEDURE — 99213 OFFICE O/P EST LOW 20 MIN: CPT | Performed by: FAMILY MEDICINE

## 2023-06-27 PROCEDURE — 3078F DIAST BP <80 MM HG: CPT | Performed by: FAMILY MEDICINE

## 2023-06-27 PROCEDURE — 3074F SYST BP LT 130 MM HG: CPT | Performed by: FAMILY MEDICINE

## 2023-06-27 ASSESSMENT — PATIENT HEALTH QUESTIONNAIRE - PHQ9: CLINICAL INTERPRETATION OF PHQ2 SCORE: 0

## 2023-06-27 ASSESSMENT — FIBROSIS 4 INDEX: FIB4 SCORE: 1.09

## 2023-06-27 NOTE — PROGRESS NOTES
Subjective:     CC: Here for several issues.    HPI:   Beth presents today with the following medical concerns:    Chronic venous insufficiency  This is a chronic problem.  Patient was recently seen at the vascular clinic and a venous ultrasound did show significant reflux in her deep veins.  She is due to follow-up with them to go over the results.  They did recommend a few herbal preparations to help her along with compression dressings.    Age-related osteoporosis without current pathological fracture  This is a chronic problem.  On patient's last DEXA scan she was seen to have osteoporosis to the hip and osteopenia to the spine.  She has been on vitamin D and calcium supplementations.  She is past due for DEXA scan but does not want to do another one.  She also does not sound interested in the biphosphonate's.    Functional diarrhea  This is a chronic problem.  She was seen and evaluated by gastroenterology and her only stool test that was positive was her lacto ferritin.  She did have a colonoscopy and EGD.  Biopsies were done but we do not have those results.  Patient thinks they were normal.  She says they did not recommend anything for her.  She knows she has lactose intolerance.  Also fatty foods bother her.  But her fecal fat study was negative.  She has not changed any of her medications.  She is tending only to have diarrhea like 3:00 in the morning.  She does not take anything at bedtime in regards to supplements or vitamins.    Past Medical History:   Diagnosis Date    Anesthesia 1974/2002    PONV    Arthritis     Breast cancer (HCC)     Cancer (HCC) 2006    right breast     CATARACT     surgical correction bilateral    Dental disorder     bridge upper front; 5/7/2015 dental extraction with infection; temporary right lower    Diverticulitis     GERD (gastroesophageal reflux disease)     Heart burn 12/17/13    Hypertension     Indigestion     MEDICAL HOME 6/9/2015    Osteoporosis     Pain      chronic pain knees; ankles, feet, right shoulder, arm and hand    Personal history of venous thrombosis and embolism 12/31/2013    leg    Pneumonia 1984    PVD (peripheral vascular disease) (HCC)     Unspecified hemorrhagic conditions     had GI bleed diverticulitis       Social History     Tobacco Use    Smoking status: Never    Smokeless tobacco: Never   Vaping Use    Vaping Use: Never used   Substance Use Topics    Alcohol use: No    Drug use: No       Current Outpatient Medications Ordered in Epic   Medication Sig Dispense Refill    gabapentin (NEURONTIN) 100 MG Cap       losartan (COZAAR) 25 MG Tab TAKE ONE TABLET BY MOUTH EVERY  Tablet 1    omeprazole (PRILOSEC) 20 MG delayed-release capsule Take 1 Capsule by mouth every day. 90 Capsule 3    estradiol (ESTRACE) 0.1 MG/GM vaginal cream USE 1 GRAM VAGINALLY 3 TIMES A WEEK 127.5 g 1    celecoxib (CELEBREX) 100 MG Cap Take 1 Capsule by mouth 2 times a day. 180 Capsule 3    amLODIPine (NORVASC) 2.5 MG Tab Take 1 Tablet by mouth every day. 90 Tablet 3    Acetaminophen (TYLENOL ARTHRITIS PAIN PO) Take  by mouth.      latanoprost (XALATAN) 0.005 % Solution       Cholecalciferol (VITAMIN D) 125 MCG (5000 UT) Cap Take  by mouth.      KRILL OIL PO Take  by mouth.      Multiple Vitamin (MULTIVITAMIN PO) Take  by mouth.       No current Epic-ordered facility-administered medications on file.       Allergies:  Ibuprofen, Lisinopril, Morphine, and Other misc    Health Maintenance: Completed    ROS:  Gen: no fevers/chills, no changes in weight  Eyes: no changes in vision  ENT: no sore throat, no hearing loss, no bloody nose  Pulm: no sob, no cough  CV: no chest pain, no palpitations  GI: no nausea/vomiting,   : no dysuria  MSk: no myalgias  Skin: no rash  Neuro: no headaches, no numbness/tingling  Heme/Lymph: no easy bruising      Objective:       Exam:  /74 (BP Location: Left arm, Patient Position: Sitting, BP Cuff Size: Adult)   Pulse 76   Temp 37.1 °C  "(98.7 °F) (Temporal)   Resp 18   Ht 1.6 m (5' 3\")   Wt 60.1 kg (132 lb 6.4 oz)   SpO2 98%   BMI 23.45 kg/m²  Body mass index is 23.45 kg/m².    Gen: Alert and oriented, No apparent distress.  Ext: No clubbing, cyanosis.  Patient does have obvious edema to her lower extremities.      Labs: Reviewed    Assessment & Plan:     83 y.o. female with the following -     1. Chronic venous insufficiency  This is a chronic problem.  I reviewed with her the recommendation for the vascular clinic.  She is going to follow-up with them to see what else they advise.  I told her I was not sure that diuretics would help but they do have side effects and she is not sure she wants them anyway.    2. Age-related osteoporosis without current pathological fracture  This is a chronic problem.  Patient declines DEXA scan at this time.  Continue vitamin D and calcium supplementation.    3. Functional diarrhea  This is a chronic problem.  We will request her records to see what the gastroenterologist biopsy showed and what they recommended.  If they were all normal then she might just have IBS with diarrhea.  Otherwise I told her to try stopping her omeprazole as that has been known to cause diarrhea.  If she has heartburn she can try something different like Pepcid or Protonix.      Return in about 3 months (around 9/27/2023) for Long.    Please note that this dictation was created using voice recognition software. I have made every reasonable attempt to correct obvious errors, but I expect that there are errors of grammar and possibly content that I did not discover before finalizing the note.        "

## 2023-06-27 NOTE — LETTER
[facility]  Pancho Cole III, M.D.  [address]  [facility] [address]  Fax: [phone]   Authorization for Release/Disclosure of   Protected Health Information   Name: ABDULAZIZ SOW : 1940 SSN: [id number]   Address: [address] [place] [address] Phone:    [phone] (home) [phone] (work)   I authorize the entity listed below to release/disclose the PHI below to:   [facility]/Pancho Cole III, M.D. and Pancho Cole III, M.D.   Provider or Entity Name:  [facility]    Address   [facility] [place], [facility], [facility]  [address] [place] [address] Phone:   [phone]    Fax:   [phone]   Reason for request: continuity of care   Information to be released:    [ XX ] LAST COLONOSCOPY,  including any PATH REPORT and follow-up  [  ] LAST FIT/COLOGUARD RESULT [  ] LAST DEXA  [  ] LAST MAMMOGRAM  [  ] LAST PAP  [  ] LAST LABS  [XX ] Last EGD and path report  [  ] RETINA EXAM REPORT  [  ] IMMUNIZATION RECORDS  [ x ] Release all info including path reports       [  ] Check here and initial the line next to each item to release ALL health information INCLUDING  _____ Care and treatment for drug and / or alcohol abuse  _____ HIV testing, infection status, or AIDS  _____ Genetic Testing    DATES OF SERVICE OR TIME PERIOD TO BE DISCLOSED: _____________  I understand and acknowledge that:  * This Authorization may be revoked at any time by you in writing, except if your health information has already been used or disclosed.  * Your health information that will be used or disclosed as a result of you signing this authorization could be re-disclosed by the recipient. If this occurs, your re-disclosed health information may no longer be protected by State or Federal laws.  * You may refuse to sign this Authorization. Your refusal will not affect your ability to obtain treatment.  * This Authorization becomes effective upon signing and will  on (date) __________.       If no date is indicated, this Authorization will  one (1) year from the signature date.    Name: Beth Fuller  Signature: Date:   2023     PLEASE FAX REQUESTED RECORDS BACK TO: [phone]

## 2023-06-27 NOTE — ASSESSMENT & PLAN NOTE
This is a chronic problem.  She was seen and evaluated by gastroenterology and her only stool test that was positive was her lacto ferritin.  She did have a colonoscopy and EGD.  Biopsies were done but we do not have those results.  Patient thinks they were normal.  She says they did not recommend anything for her.  She knows she has lactose intolerance.  Also fatty foods bother her.  But her fecal fat study was negative.  She has not changed any of her medications.  She is tending only to have diarrhea like 3:00 in the morning.  She does not take anything at bedtime in regards to supplements or vitamins.

## 2023-06-27 NOTE — ASSESSMENT & PLAN NOTE
This is a chronic problem.  Patient was recently seen at the vascular clinic and a venous ultrasound did show significant reflux in her deep veins.  She is due to follow-up with them to go over the results.  They did recommend a few herbal preparations to help her along with compression dressings.

## 2023-06-27 NOTE — ASSESSMENT & PLAN NOTE
This is a chronic problem.  On patient's last DEXA scan she was seen to have osteoporosis to the hip and osteopenia to the spine.  She has been on vitamin D and calcium supplementations.  She is past due for DEXA scan but does not want to do another one.  She also does not sound interested in the biphosphonate's.

## 2023-07-27 ENCOUNTER — TELEPHONE (OUTPATIENT)
Dept: MEDICAL GROUP | Facility: PHYSICIAN GROUP | Age: 83
End: 2023-07-27
Payer: MEDICARE

## 2023-07-27 NOTE — TELEPHONE ENCOUNTER
"Jazmyn from [facility] called and Lvm regarding patient stating that she seems to be experiencing from Lymphedema that is \"really bad\". They were wondering if there was something that could help or if they could get in to see Dr. Cole   "

## 2023-08-21 ENCOUNTER — OFFICE VISIT (OUTPATIENT)
Dept: VASCULAR LAB | Facility: MEDICAL CENTER | Age: 83
End: 2023-08-21
Attending: NURSE PRACTITIONER
Payer: MEDICARE

## 2023-08-21 VITALS
SYSTOLIC BLOOD PRESSURE: 118 MMHG | HEART RATE: 62 BPM | WEIGHT: 120 LBS | BODY MASS INDEX: 19.99 KG/M2 | HEIGHT: 65 IN | DIASTOLIC BLOOD PRESSURE: 67 MMHG

## 2023-08-21 DIAGNOSIS — I87.2 CHRONIC VENOUS INSUFFICIENCY: ICD-10-CM

## 2023-08-21 PROCEDURE — 3074F SYST BP LT 130 MM HG: CPT | Performed by: NURSE PRACTITIONER

## 2023-08-21 PROCEDURE — 99212 OFFICE O/P EST SF 10 MIN: CPT

## 2023-08-21 PROCEDURE — 3078F DIAST BP <80 MM HG: CPT | Performed by: NURSE PRACTITIONER

## 2023-08-21 PROCEDURE — 99214 OFFICE O/P EST MOD 30 MIN: CPT | Performed by: NURSE PRACTITIONER

## 2023-08-21 ASSESSMENT — ENCOUNTER SYMPTOMS
SHORTNESS OF BREATH: 0
FALLS: 0
WHEEZING: 0
PALPITATIONS: 0

## 2023-08-21 ASSESSMENT — FIBROSIS 4 INDEX: FIB4 SCORE: 1.09

## 2023-08-21 NOTE — PROGRESS NOTES
Follow UP VASCULAR MEDICINE VISIT  Beth Fuller is a 83 y.o. female  who presents today 8/21/2023  for   No chief complaint on file.    initially referred by Pancho Cole III, MD for eval and med mgmt of CVI and associated symptoms      Subjective         Chronic venous insufficiency:  Current/Interval hx:  Continues to have swelling in both ankles, but improved.  Wearing compression stockings daily.   Cardiology restarted amlodipine as pt did not think swelling changed with stopping.    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  Had preg-associate VTE in the distant past.  Had R TKA - placed in 2013 on DOAC.  L TKA 2015 - no clots.  No current OAC  Prior imaging or work-up: 2014 - duplex scan  Prior interventions:     Compression stockings: thigh-high 20-30mmHg   Medications/phlebotonics: none    Procedural interventions:  Multiple vein stripping, [place] in [place]        Current Outpatient Medications:     gabapentin,     losartan, TAKE ONE TABLET BY MOUTH EVERY DAY    omeprazole, 20 mg, Oral, QDAY    estradiol, USE 1 GRAM VAGINALLY 3 TIMES A WEEK    celecoxib, 100 mg, Oral, BID    amLODIPine, 2.5 mg, Oral, DAILY    Acetaminophen (TYLENOL ARTHRITIS PAIN PO), Take  by mouth.    latanoprost,     Vitamin D, Take  by mouth.    KRILL OIL PO, Take  by mouth.    Multiple Vitamin (MULTIVITAMIN PO), Take  by mouth.   Allergies   Allergen Reactions    Ibuprofen      Stomach upset    Lisinopril      dizz    Morphine Vomiting    Other Misc      Metals: zinc oxide, vanadium chloride, manganese chloride        Social History     Tobacco Use    Smoking status: Never    Smokeless tobacco: Never   Vaping Use    Vaping Use: Never used   Substance Use Topics    Alcohol use: No    Drug use: No     Review of Systems   Constitutional:  Negative for malaise/fatigue.   Respiratory:   "Negative for shortness of breath and wheezing.    Cardiovascular:  Positive for leg swelling. Negative for chest pain and palpitations.   Musculoskeletal:  Negative for falls.         Objective   There were no vitals filed for this visit.       BP Readings from Last 5 Encounters:   06/27/23 126/74   05/19/23 123/70   11/18/22 (!) 158/100   10/11/22 134/78   07/22/22 (!) 150/88      There is no height or weight on file to calculate BMI.  Physical Exam  Constitutional:       General: She is not in acute distress.     Appearance: She is not diaphoretic.   Pulmonary:      Effort: Pulmonary effort is normal.   Musculoskeletal:         General: Swelling (Tr BLE) present. Normal range of motion.   Skin:     General: Skin is warm and dry.   Neurological:      Mental Status: She is alert and oriented to person, place, and time.   Psychiatric:         Mood and Affect: Mood normal.         Behavior: Behavior normal.         Thought Content: Thought content normal.       Lab Results   Component Value Date    CHOLSTRLTOT 180 03/18/2022    LDL 51 03/18/2022     03/18/2022    TRIGLYCERIDE 52 03/18/2022      No results found for: \"LIPOPROTA\"   No results found for: \"APOB\"             Lab Results   Component Value Date    SODIUM 138 12/14/2022    POTASSIUM 4.6 12/14/2022    CHLORIDE 102 12/14/2022    CO2 27 12/14/2022    GLUCOSE 124 (H) 12/14/2022    BUN 23 (H) 12/14/2022    CREATININE 0.90 12/14/2022        Lab Results   Component Value Date    WBC 5.4 12/14/2022    RBC 3.71 (L) 12/14/2022    HEMOGLOBIN 11.4 (L) 12/14/2022    HEMATOCRIT 34.9 (L) 12/14/2022    MCV 94.1 12/14/2022    MCH 30.7 12/14/2022    MCHC 32.7 (L) 12/14/2022    MPV 10.6 12/14/2022         Lab Results   Component Value Date/Time    MALBCRT 28 05/18/2018 07:53 AM    MICROALBUR 1.9 05/18/2018 07:53 AM        VASCULAR IMAGING:   Last EKG:   Results for orders placed or performed in visit on 01/04/22   Cardiac Event Monitor   Result Value Ref Range    Report  "      [facility]    Test Date:  2021  Pt Name:    BENJAMIN SOW               Department: [facility]  MRN:        [id number]                      Room:  Gender:     Female                       Technician: Sridhar  :        [id number]                   Requested By:DESIREE WAGNER  Order #:    [id number]                    Reading MD: Desiree Wagner      Interpretive Statements  *   Monitoring started at 11:20 AM and continued for 24 hours. The overall  rhythm was Sinus with a LBBB. The average heart  rate was 75 BPM. The minimum heart rate was 54 BPM, occurring at 3:32:34 PM.  The maximum heart rate was 120 BPM,  occurring at 4:36:09 PM. The longest R-R interval was 1.7 seconds occurring  at 6:42:04 AM.    *   Ventricular ectopic activity consisted of 2 runs, 2 triplets, 171  couplets, 8348 single, multifocal PVCs, 4 interpolated PVCs,  621 single endiastolic beats, 602 in bigeminy, 424 in trigeminy. The longest  ventricular run occurred at 3:40:04 PM, consisting  of 4 pat ts, with maximum heart rate of 90 BPM. The fastest ventricular run  occurred at 4:31:26 PM, consisting of 4 beats, with  maximum heart rate of 90 BPM.    *   The patient's rhythm included 14 minutes 19 seconds of bradycardia. The  slowest single episode of bradycardia occurred at  11:58:05 AM, lasting 11 seconds, with minimum heart rate of 57 BPM.    *   The patient's rhythm included 3 minutes 56 seconds of tachycardia. The  fastest single episode of tachycardia occurred at  4:35:14 PM, lasting 1 minute 41 seconds, with maximum heart rate of 120 BPM.    *   Supraventricular ectopic activity consisted of 2 runs, 14 were in atrial  couplets, 94 late beats, 2081 single PACs, 42 in  bigeminy, 499 in trigeminy. The longest supraventricular run occurred at  8:39:03 AM consisting of 4 beats, with maximum heart  rate of 130 BPM. The fastest supraventricular run occurred at 4:37:34 PM,  consisting of 3 beats, with maximum heart rate  of  176 BPM.    24 Hour Holter Summary 1-4-21:  Sinus, LBBB, n ormal heart rate range, no pauses.  Frequent PVCs, 9.7% burden, 10,355/24 hrs.  Occasional bigeminy, couplets.  Occasional PACs, 4 beat atrial run.  No sustained rhythm changes.  No symptoms reported.    Electronically Signed On 1-4-2022 15:37:36 PST by Marietta Wagner       BLE venous 2014  1.  No definitive evidence of Right  lower extremity deep venous thrombosis.   2.  There is a partially occlusive thrombus in the left proximal gastrocnemius vein. This can be considered a deep vein.   3. There is a superficial partial thrombus in the right lesser saphenous vein in the posterior calf.   4. Complex left popliteal fluid collection which could be a popliteal cyst or postoperative seroma/resolving hematoma.   5. Minimal fluid collection in the right groin possibly from previous line placement or surgery.    BLE 6/2015   1.  Normal right lower extremity deep venous examination.    2.  Surgical absence of the left greater saphenous vein.    RLE venous 2015  No evidence of right lower extremity deep venous thrombosis.    Echo 2/2022  The left ventricular ejection fraction is visually estimated to be 65%.  Mild concentric left ventricular hypertrophy, only 1 cm.  Normal left atrial size.  Trivial aortic valve sclerosis without significant stenosis.  Only mild mitral/aortic regurgitation.  Estimated right ventricular systolic pressure is 25 mmHg.   Compared to the prior echo on 03/21/14, no significant change.    CT abd 2020  AORTA and VASCULATURE: shows atherosclerosis without aneurysm.    6/16/23   LE Vein Reflux   Report      Vascular Laboratory   CONCLUSIONS   Right lower extremity.   Significant deep venous reflux (900 ms) in the femoral vein.        Medical Decision Making:  Today's Assessment / Status / Plan:     No diagnosis found.    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 / Pr,o presumed  R CEAP:  C3aA / Ep,s / A? / P ?   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, much improved off celebrex  CCB previously stopped without change to LE swelling- unlikely a contributor swelling  At initial visit, reviewed anatomy, pathophys and gave educ handouts regarding CVI, common s/s, possible complications, and tx options   Had LE venous reflux exam with significant deep vein reflux  Plan:  - continue thigh-high compression socks, 20-30mmHg, as much as tolerated  - increase walking, avoid prolonged standing   - elevated legs while sitting and sleeping above heart level  - reduce sodium (\"salt\") in diet to less than 2,000mg daily   - continue daily moisturizing lotion (such as [employer] Diabetic Foot Cream)  -consider PT evaluation for pumps or therapy in future for symptom management  Medications:    - continue horse chestnut seed extract 300mg 2 times daily, no noted improvement, can trial holding for a couple weeks to see if notice any change in symptoms.  Restart if symptoms worsen.   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    Plan:  - see antithrombotic plan below     3) abd ao athero - no prior aneurysm  - no further suveillance, continue med mgmt     BLOOD PRESSURE MANAGEMENT:  ACC/AHA (2017) goal <130/80  Home BP at goal:  unknown, not checking  Office BP at goal:  yes  24h ABPM: not ordered to date  Mild LVH on echo indicative of long-term HTN   Plan: "   Monitoring:   - start/continue home BP monitoring, reviewed correct technique, provide BP log and instructions  - order 24h ABPM:  NO  - monitor lytes/gfr routinely as per PCP  - contact office if BP consistently >140/>90 for discussion of tx adjustments   - defer further treatment and management to PCP/cardiology  Medications:  - continue amlodipine 2.5mg as reordered by cardiology and PCP, -consider even though low dose, may still contribute to LE edema, pt aware  - continue losartan 25mg daily     LIPID MANAGEMENT:   Qualifies for Statin Therapy Based on 2018 ACC/AHA Guidelines: no  The ASCVD Risk score (Arsalan PRIETO, et al., 2019) failed to calculate.  Major ASCVD events: None  High-risk conditions: None   Risk-enhancers: N/A  Currently on Statin: No  Tx goals: LDL-C <100 (consider non-HDL-C <130, apoB <90)  At goal? N/A  Plan:   - reinforced ongoing TLC measures as noted   - defer lab surveillance to PCP   Meds: none at this time     ANTITHROMBOTIC/ANTIPLATELET THERAPY: not indicated at this time     LIFESTYLE INTERVENTIONS:    SMOKING:   reports that she has never smoked. She has never used smokeless tobacco.   - continued complete avoidance of all tobacco products     PHYSICAL ACTIVITY: continue healthy activity to improve CV fitness.  In general, targeting >150min/week of moderate-level activity.       Instructed to follow-up with PCP for remainder of adult medical needs: yes  We will partner with other providers in the management of established vascular disease and cardiometabolic risk factors.    Studies to Be Obtained:   Labs to Be Obtained: none, as per PCP    Follow up in: defer back to PCP for continued managment    DOMO Heller.  [facility]   [facility]   [phone]     CC:  Dr. Cole

## 2023-12-08 DIAGNOSIS — I10 ESSENTIAL HYPERTENSION: ICD-10-CM

## 2023-12-08 RX ORDER — AMLODIPINE BESYLATE 2.5 MG/1
2.5 TABLET ORAL DAILY
Qty: 90 TABLET | Refills: 0 | Status: SHIPPED | OUTPATIENT
Start: 2023-12-08

## 2023-12-19 ENCOUNTER — HOSPITAL ENCOUNTER (OUTPATIENT)
Dept: LAB | Facility: MEDICAL CENTER | Age: 83
End: 2023-12-19
Attending: NURSE PRACTITIONER
Payer: MEDICARE

## 2023-12-19 LAB
ACANTHOCYTES BLD QL SMEAR: NORMAL
BASOPHILS # BLD AUTO: 0.9 % (ref 0–1.8)
BASOPHILS # BLD: 0.05 K/UL (ref 0–0.12)
EOSINOPHIL # BLD AUTO: 0 K/UL (ref 0–0.51)
EOSINOPHIL NFR BLD: 0 % (ref 0–6.9)
ERYTHROCYTE [DISTWIDTH] IN BLOOD BY AUTOMATED COUNT: 40.8 FL (ref 35.9–50)
FERRITIN SERPL-MCNC: 140 NG/ML (ref 10–291)
HCT VFR BLD AUTO: 26.6 % (ref 37–47)
HGB BLD-MCNC: 8.3 G/DL (ref 12–16)
IRON SATN MFR SERPL: 6 % (ref 15–55)
IRON SERPL-MCNC: 11 UG/DL (ref 40–170)
LYMPHOCYTES # BLD AUTO: 0.16 K/UL (ref 1–4.8)
LYMPHOCYTES NFR BLD: 2.6 % (ref 22–41)
MANUAL DIFF BLD: NORMAL
MCH RBC QN AUTO: 20.2 PG (ref 27–33)
MCHC RBC AUTO-ENTMCNC: 31.2 G/DL (ref 32.2–35.5)
MCV RBC AUTO: 64.9 FL (ref 81.4–97.8)
MONOCYTES # BLD AUTO: 0.88 K/UL (ref 0–0.85)
MONOCYTES NFR BLD AUTO: 14.6 % (ref 0–13.4)
MORPHOLOGY BLD-IMP: NORMAL
MYELOCYTES NFR BLD MANUAL: 0.9 %
NEUTROPHILS # BLD AUTO: 4.86 K/UL (ref 1.82–7.42)
NEUTROPHILS NFR BLD: 81 % (ref 44–72)
NRBC # BLD AUTO: 0 K/UL
NRBC BLD-RTO: 0 /100 WBC (ref 0–0.2)
PLATELET # BLD AUTO: 389 K/UL (ref 164–446)
PLATELET BLD QL SMEAR: NORMAL
PMV BLD AUTO: 9.4 FL (ref 9–12.9)
POIKILOCYTOSIS BLD QL SMEAR: NORMAL
RBC # BLD AUTO: 4.1 M/UL (ref 4.2–5.4)
RBC BLD AUTO: PRESENT
TIBC SERPL-MCNC: 190 UG/DL (ref 250–450)
UIBC SERPL-MCNC: 179 UG/DL (ref 110–370)
WBC # BLD AUTO: 6 K/UL (ref 4.8–10.8)

## 2023-12-19 PROCEDURE — 36415 COLL VENOUS BLD VENIPUNCTURE: CPT

## 2023-12-19 PROCEDURE — 83540 ASSAY OF IRON: CPT

## 2023-12-19 PROCEDURE — 82728 ASSAY OF FERRITIN: CPT

## 2023-12-19 PROCEDURE — 85007 BL SMEAR W/DIFF WBC COUNT: CPT

## 2023-12-19 PROCEDURE — 85027 COMPLETE CBC AUTOMATED: CPT

## 2023-12-19 PROCEDURE — 83550 IRON BINDING TEST: CPT

## 2023-12-20 ENCOUNTER — HOSPITAL ENCOUNTER (OUTPATIENT)
Facility: MEDICAL CENTER | Age: 83
End: 2023-12-20
Attending: NURSE PRACTITIONER
Payer: MEDICARE

## 2023-12-20 PROCEDURE — 87493 C DIFF AMPLIFIED PROBE: CPT

## 2023-12-21 LAB
C DIFF DNA SPEC QL NAA+PROBE: NEGATIVE
C DIFF TOX GENS STL QL NAA+PROBE: NEGATIVE

## 2023-12-30 ENCOUNTER — HOSPITAL ENCOUNTER (INPATIENT)
Facility: MEDICAL CENTER | Age: 83
LOS: 2 days | DRG: 394 | End: 2024-01-04
Attending: EMERGENCY MEDICINE | Admitting: STUDENT IN AN ORGANIZED HEALTH CARE EDUCATION/TRAINING PROGRAM
Payer: MEDICARE

## 2023-12-30 ENCOUNTER — APPOINTMENT (OUTPATIENT)
Dept: RADIOLOGY | Facility: MEDICAL CENTER | Age: 83
DRG: 394 | End: 2023-12-30
Attending: EMERGENCY MEDICINE
Payer: MEDICARE

## 2023-12-30 DIAGNOSIS — K62.5 PROCTOCOLITIS WITH RECTAL BLEEDING: ICD-10-CM

## 2023-12-30 DIAGNOSIS — D50.0 IRON DEFICIENCY ANEMIA DUE TO CHRONIC BLOOD LOSS: ICD-10-CM

## 2023-12-30 DIAGNOSIS — K52.9 PROCTOCOLITIS WITH RECTAL BLEEDING: ICD-10-CM

## 2023-12-30 DIAGNOSIS — R53.81 DEBILITY: ICD-10-CM

## 2023-12-30 DIAGNOSIS — D50.9 IRON DEFICIENCY ANEMIA, UNSPECIFIED IRON DEFICIENCY ANEMIA TYPE: ICD-10-CM

## 2023-12-30 DIAGNOSIS — I87.2 CHRONIC VENOUS INSUFFICIENCY: ICD-10-CM

## 2023-12-30 DIAGNOSIS — E44.0 MODERATE PROTEIN-CALORIE MALNUTRITION (HCC): ICD-10-CM

## 2023-12-30 DIAGNOSIS — K52.9 COLITIS: ICD-10-CM

## 2023-12-30 DIAGNOSIS — M81.0 AGE-RELATED OSTEOPOROSIS WITHOUT CURRENT PATHOLOGICAL FRACTURE: ICD-10-CM

## 2023-12-30 DIAGNOSIS — K59.1 FUNCTIONAL DIARRHEA: ICD-10-CM

## 2023-12-30 DIAGNOSIS — K64.9 HEMORRHOIDS, UNSPECIFIED HEMORRHOID TYPE: ICD-10-CM

## 2023-12-30 DIAGNOSIS — K21.9 GASTROESOPHAGEAL REFLUX DISEASE WITHOUT ESOPHAGITIS: ICD-10-CM

## 2023-12-30 DIAGNOSIS — M19.90 ARTHRITIS: ICD-10-CM

## 2023-12-30 LAB
ABO GROUP BLD: NORMAL
ALBUMIN SERPL BCP-MCNC: 2.2 G/DL (ref 3.2–4.9)
ALBUMIN/GLOB SERPL: 0.6 G/DL
ALP SERPL-CCNC: 115 U/L (ref 30–99)
ALT SERPL-CCNC: 13 U/L (ref 2–50)
ANION GAP SERPL CALC-SCNC: 11 MMOL/L (ref 7–16)
ANISOCYTOSIS BLD QL SMEAR: ABNORMAL
APTT PPP: 31.8 SEC (ref 24.7–36)
AST SERPL-CCNC: 6 U/L (ref 12–45)
BARCODED ABORH UBTYP: 5100
BARCODED PRD CODE UBPRD: NORMAL
BARCODED UNIT NUM UBUNT: NORMAL
BASOPHILS # BLD AUTO: 0 % (ref 0–1.8)
BASOPHILS # BLD: 0 K/UL (ref 0–0.12)
BILIRUB SERPL-MCNC: 0.5 MG/DL (ref 0.1–1.5)
BLD GP AB SCN SERPL QL: NORMAL
BUN SERPL-MCNC: 36 MG/DL (ref 8–22)
BURR CELLS BLD QL SMEAR: NORMAL
CALCIUM ALBUM COR SERPL-MCNC: 9.3 MG/DL (ref 8.5–10.5)
CALCIUM SERPL-MCNC: 7.9 MG/DL (ref 8.5–10.5)
CHLORIDE SERPL-SCNC: 101 MMOL/L (ref 96–112)
CO2 SERPL-SCNC: 17 MMOL/L (ref 20–33)
COMPONENT R 8504R: NORMAL
CREAT SERPL-MCNC: 0.93 MG/DL (ref 0.5–1.4)
EKG IMPRESSION: NORMAL
EOSINOPHIL # BLD AUTO: 0 K/UL (ref 0–0.51)
EOSINOPHIL NFR BLD: 0 % (ref 0–6.9)
ERYTHROCYTE [DISTWIDTH] IN BLOOD BY AUTOMATED COUNT: 41.9 FL (ref 35.9–50)
GFR SERPLBLD CREATININE-BSD FMLA CKD-EPI: 60 ML/MIN/1.73 M 2
GLOBULIN SER CALC-MCNC: 3.4 G/DL (ref 1.9–3.5)
GLUCOSE SERPL-MCNC: 138 MG/DL (ref 65–99)
HCT VFR BLD AUTO: 24.8 % (ref 37–47)
HGB BLD-MCNC: 8.2 G/DL (ref 12–16)
HYPOCHROMIA BLD QL SMEAR: ABNORMAL
INR PPP: 1.5 (ref 0.87–1.13)
LIPASE SERPL-CCNC: 9 U/L (ref 11–82)
LYMPHOCYTES # BLD AUTO: 0 K/UL (ref 1–4.8)
LYMPHOCYTES NFR BLD: 0 % (ref 22–41)
MANUAL DIFF BLD: NORMAL
MCH RBC QN AUTO: 20.8 PG (ref 27–33)
MCHC RBC AUTO-ENTMCNC: 33.1 G/DL (ref 32.2–35.5)
MCV RBC AUTO: 62.8 FL (ref 81.4–97.8)
MICROCYTES BLD QL SMEAR: ABNORMAL
MONOCYTES # BLD AUTO: 0.14 K/UL (ref 0–0.85)
MONOCYTES NFR BLD AUTO: 1.8 % (ref 0–13.4)
MORPHOLOGY BLD-IMP: NORMAL
NEUTROPHILS # BLD AUTO: 7.76 K/UL (ref 1.82–7.42)
NEUTROPHILS NFR BLD: 98.2 % (ref 44–72)
NRBC # BLD AUTO: 0 K/UL
NRBC BLD-RTO: 0 /100 WBC (ref 0–0.2)
OVALOCYTES BLD QL SMEAR: NORMAL
PLATELET # BLD AUTO: 227 K/UL (ref 164–446)
PLATELET BLD QL SMEAR: NORMAL
PMV BLD AUTO: 8.8 FL (ref 9–12.9)
POIKILOCYTOSIS BLD QL SMEAR: NORMAL
POTASSIUM SERPL-SCNC: 4.4 MMOL/L (ref 3.6–5.5)
PRODUCT TYPE UPROD: NORMAL
PROT SERPL-MCNC: 5.6 G/DL (ref 6–8.2)
PROTHROMBIN TIME: 18.3 SEC (ref 12–14.6)
RBC # BLD AUTO: 3.95 M/UL (ref 4.2–5.4)
RBC BLD AUTO: PRESENT
RH BLD: NORMAL
SODIUM SERPL-SCNC: 129 MMOL/L (ref 135–145)
TOXIC GRANULES BLD QL SMEAR: NORMAL
UNIT STATUS USTAT: NORMAL
WBC # BLD AUTO: 7.9 K/UL (ref 4.8–10.8)

## 2023-12-30 PROCEDURE — 71045 X-RAY EXAM CHEST 1 VIEW: CPT

## 2023-12-30 PROCEDURE — 700105 HCHG RX REV CODE 258: Performed by: EMERGENCY MEDICINE

## 2023-12-30 PROCEDURE — 85730 THROMBOPLASTIN TIME PARTIAL: CPT

## 2023-12-30 PROCEDURE — 80053 COMPREHEN METABOLIC PANEL: CPT

## 2023-12-30 PROCEDURE — 86850 RBC ANTIBODY SCREEN: CPT

## 2023-12-30 PROCEDURE — 85007 BL SMEAR W/DIFF WBC COUNT: CPT

## 2023-12-30 PROCEDURE — 99285 EMERGENCY DEPT VISIT HI MDM: CPT

## 2023-12-30 PROCEDURE — 36415 COLL VENOUS BLD VENIPUNCTURE: CPT

## 2023-12-30 PROCEDURE — 83690 ASSAY OF LIPASE: CPT

## 2023-12-30 PROCEDURE — 85610 PROTHROMBIN TIME: CPT

## 2023-12-30 PROCEDURE — 93005 ELECTROCARDIOGRAM TRACING: CPT

## 2023-12-30 PROCEDURE — 86901 BLOOD TYPING SEROLOGIC RH(D): CPT

## 2023-12-30 PROCEDURE — 85027 COMPLETE CBC AUTOMATED: CPT

## 2023-12-30 PROCEDURE — 93005 ELECTROCARDIOGRAM TRACING: CPT | Performed by: EMERGENCY MEDICINE

## 2023-12-30 PROCEDURE — 86900 BLOOD TYPING SEROLOGIC ABO: CPT

## 2023-12-30 RX ORDER — SODIUM CHLORIDE, SODIUM LACTATE, POTASSIUM CHLORIDE, CALCIUM CHLORIDE 600; 310; 30; 20 MG/100ML; MG/100ML; MG/100ML; MG/100ML
1000 INJECTION, SOLUTION INTRAVENOUS ONCE
Status: COMPLETED | OUTPATIENT
Start: 2023-12-30 | End: 2023-12-31

## 2023-12-30 RX ADMIN — SODIUM CHLORIDE, POTASSIUM CHLORIDE, SODIUM LACTATE AND CALCIUM CHLORIDE 1000 ML: 600; 310; 30; 20 INJECTION, SOLUTION INTRAVENOUS at 23:25

## 2023-12-30 ASSESSMENT — FIBROSIS 4 INDEX: FIB4 SCORE: 0.8

## 2023-12-31 PROBLEM — K52.9 COLITIS: Status: ACTIVE | Noted: 2023-12-31

## 2023-12-31 PROBLEM — D50.9 IRON DEFICIENCY ANEMIA: Status: ACTIVE | Noted: 2023-12-31

## 2023-12-31 PROBLEM — K64.9 HEMORRHOIDS: Status: ACTIVE | Noted: 2023-12-31

## 2023-12-31 PROBLEM — N89.8 VAGINAL DISCHARGE: Status: ACTIVE | Noted: 2023-12-31

## 2023-12-31 PROBLEM — R53.81 DEBILITY: Status: ACTIVE | Noted: 2023-12-31

## 2023-12-31 LAB
ANION GAP SERPL CALC-SCNC: 9 MMOL/L (ref 7–16)
APPEARANCE UR: CLEAR
BILIRUB UR QL STRIP.AUTO: NEGATIVE
BUN SERPL-MCNC: 30 MG/DL (ref 8–22)
CALCIUM SERPL-MCNC: 7.4 MG/DL (ref 8.5–10.5)
CANDIDA DNA VAG QL PROBE+SIG AMP: NEGATIVE
CHLORIDE SERPL-SCNC: 102 MMOL/L (ref 96–112)
CO2 SERPL-SCNC: 19 MMOL/L (ref 20–33)
COLOR UR: YELLOW
CREAT SERPL-MCNC: 0.66 MG/DL (ref 0.5–1.4)
ERYTHROCYTE [DISTWIDTH] IN BLOOD BY AUTOMATED COUNT: 41.7 FL (ref 35.9–50)
FERRITIN SERPL-MCNC: 210 NG/ML (ref 10–291)
G VAGINALIS DNA VAG QL PROBE+SIG AMP: NEGATIVE
GFR SERPLBLD CREATININE-BSD FMLA CKD-EPI: 86 ML/MIN/1.73 M 2
GLUCOSE SERPL-MCNC: 80 MG/DL (ref 65–99)
GLUCOSE UR STRIP.AUTO-MCNC: NEGATIVE MG/DL
HCT VFR BLD AUTO: 23.4 % (ref 37–47)
HGB BLD-MCNC: 7.9 G/DL (ref 12–16)
HGB RETIC QN AUTO: 28.6 PG/CELL (ref 29–35)
IMM RETICS NFR: 17.4 % (ref 2.6–16.1)
IRON SATN MFR SERPL: 13 % (ref 15–55)
IRON SERPL-MCNC: 18 UG/DL (ref 40–170)
KETONES UR STRIP.AUTO-MCNC: NEGATIVE MG/DL
LEUKOCYTE ESTERASE UR QL STRIP.AUTO: NEGATIVE
MCH RBC QN AUTO: 21 PG (ref 27–33)
MCHC RBC AUTO-ENTMCNC: 33.8 G/DL (ref 32.2–35.5)
MCV RBC AUTO: 62.1 FL (ref 81.4–97.8)
MICRO URNS: ABNORMAL
NITRITE UR QL STRIP.AUTO: NEGATIVE
PH UR STRIP.AUTO: 5 [PH] (ref 5–8)
PLATELET # BLD AUTO: 204 K/UL (ref 164–446)
PMV BLD AUTO: 8.7 FL (ref 9–12.9)
POTASSIUM SERPL-SCNC: 3.8 MMOL/L (ref 3.6–5.5)
PROT UR QL STRIP: NEGATIVE MG/DL
RBC # BLD AUTO: 3.77 M/UL (ref 4.2–5.4)
RBC UR QL AUTO: NEGATIVE
RETICS # AUTO: 0.05 M/UL (ref 0.04–0.12)
RETICS/RBC NFR: 1.4 % (ref 0.8–2.6)
SODIUM SERPL-SCNC: 130 MMOL/L (ref 135–145)
SP GR UR STRIP.AUTO: >=1.045
T VAGINALIS DNA VAG QL PROBE+SIG AMP: NEGATIVE
TIBC SERPL-MCNC: 136 UG/DL (ref 250–450)
UIBC SERPL-MCNC: 118 UG/DL (ref 110–370)
UROBILINOGEN UR STRIP.AUTO-MCNC: 0.2 MG/DL
WBC # BLD AUTO: 6.3 K/UL (ref 4.8–10.8)

## 2023-12-31 PROCEDURE — 97535 SELF CARE MNGMENT TRAINING: CPT

## 2023-12-31 PROCEDURE — 700105 HCHG RX REV CODE 258

## 2023-12-31 PROCEDURE — 83550 IRON BINDING TEST: CPT

## 2023-12-31 PROCEDURE — G0378 HOSPITAL OBSERVATION PER HR: HCPCS

## 2023-12-31 PROCEDURE — 97116 GAIT TRAINING THERAPY: CPT

## 2023-12-31 PROCEDURE — 97166 OT EVAL MOD COMPLEX 45 MIN: CPT

## 2023-12-31 PROCEDURE — 81003 URINALYSIS AUTO W/O SCOPE: CPT

## 2023-12-31 PROCEDURE — 700117 HCHG RX CONTRAST REV CODE 255: Performed by: EMERGENCY MEDICINE

## 2023-12-31 PROCEDURE — 80048 BASIC METABOLIC PNL TOTAL CA: CPT

## 2023-12-31 PROCEDURE — 83540 ASSAY OF IRON: CPT

## 2023-12-31 PROCEDURE — 96375 TX/PRO/DX INJ NEW DRUG ADDON: CPT

## 2023-12-31 PROCEDURE — 82728 ASSAY OF FERRITIN: CPT

## 2023-12-31 PROCEDURE — 51798 US URINE CAPACITY MEASURE: CPT

## 2023-12-31 PROCEDURE — 87480 CANDIDA DNA DIR PROBE: CPT

## 2023-12-31 PROCEDURE — 36415 COLL VENOUS BLD VENIPUNCTURE: CPT

## 2023-12-31 PROCEDURE — 99223 1ST HOSP IP/OBS HIGH 75: CPT | Mod: AI,GC | Performed by: STUDENT IN AN ORGANIZED HEALTH CARE EDUCATION/TRAINING PROGRAM

## 2023-12-31 PROCEDURE — 85046 RETICYTE/HGB CONCENTRATE: CPT

## 2023-12-31 PROCEDURE — 74177 CT ABD & PELVIS W/CONTRAST: CPT

## 2023-12-31 PROCEDURE — 85027 COMPLETE CBC AUTOMATED: CPT

## 2023-12-31 PROCEDURE — 700102 HCHG RX REV CODE 250 W/ 637 OVERRIDE(OP)

## 2023-12-31 PROCEDURE — 700111 HCHG RX REV CODE 636 W/ 250 OVERRIDE (IP): Performed by: EMERGENCY MEDICINE

## 2023-12-31 PROCEDURE — 96365 THER/PROPH/DIAG IV INF INIT: CPT

## 2023-12-31 PROCEDURE — 97162 PT EVAL MOD COMPLEX 30 MIN: CPT

## 2023-12-31 PROCEDURE — A9270 NON-COVERED ITEM OR SERVICE: HCPCS

## 2023-12-31 PROCEDURE — 87660 TRICHOMONAS VAGIN DIR PROBE: CPT

## 2023-12-31 PROCEDURE — 87510 GARDNER VAG DNA DIR PROBE: CPT

## 2023-12-31 RX ORDER — AMLODIPINE BESYLATE 5 MG/1
2.5 TABLET ORAL DAILY
Status: DISCONTINUED | OUTPATIENT
Start: 2023-12-31 | End: 2024-01-04 | Stop reason: HOSPADM

## 2023-12-31 RX ORDER — SODIUM CHLORIDE, SODIUM LACTATE, POTASSIUM CHLORIDE, CALCIUM CHLORIDE 600; 310; 30; 20 MG/100ML; MG/100ML; MG/100ML; MG/100ML
INJECTION, SOLUTION INTRAVENOUS CONTINUOUS
Status: ACTIVE | OUTPATIENT
Start: 2023-12-31 | End: 2024-01-01

## 2023-12-31 RX ORDER — CEFTRIAXONE 2 G/1
2000 INJECTION, POWDER, FOR SOLUTION INTRAMUSCULAR; INTRAVENOUS ONCE
Status: COMPLETED | OUTPATIENT
Start: 2023-12-31 | End: 2023-12-31

## 2023-12-31 RX ORDER — LOPERAMIDE HYDROCHLORIDE 2 MG/1
4 CAPSULE ORAL 4 TIMES DAILY PRN
Status: DISCONTINUED | OUTPATIENT
Start: 2023-12-31 | End: 2024-01-04 | Stop reason: HOSPADM

## 2023-12-31 RX ORDER — ACYCLOVIR 400 MG/1
400 TABLET ORAL 3 TIMES DAILY
Status: ON HOLD | COMMUNITY
Start: 2023-12-18 | End: 2024-01-04

## 2023-12-31 RX ORDER — OMEPRAZOLE 20 MG/1
20 CAPSULE, DELAYED RELEASE ORAL DAILY
Status: DISCONTINUED | OUTPATIENT
Start: 2023-12-31 | End: 2024-01-04 | Stop reason: HOSPADM

## 2023-12-31 RX ORDER — COLESEVELAM HYDROCHLORIDE 3.75 G/1
3.75 POWDER, FOR SUSPENSION ORAL 2 TIMES DAILY
COMMUNITY
Start: 2023-12-18

## 2023-12-31 RX ORDER — FERROUS SULFATE 325(65) MG
325 TABLET ORAL
Status: DISCONTINUED | OUTPATIENT
Start: 2023-12-31 | End: 2024-01-04 | Stop reason: HOSPADM

## 2023-12-31 RX ORDER — METRONIDAZOLE 500 MG/100ML
500 INJECTION, SOLUTION INTRAVENOUS ONCE
Status: COMPLETED | OUTPATIENT
Start: 2023-12-31 | End: 2023-12-31

## 2023-12-31 RX ORDER — LATANOPROST 50 UG/ML
1 SOLUTION/ DROPS OPHTHALMIC
Status: DISCONTINUED | OUTPATIENT
Start: 2023-12-31 | End: 2024-01-04 | Stop reason: HOSPADM

## 2023-12-31 RX ORDER — LOSARTAN POTASSIUM 25 MG/1
25 TABLET ORAL DAILY
Status: DISCONTINUED | OUTPATIENT
Start: 2023-12-31 | End: 2024-01-04 | Stop reason: HOSPADM

## 2023-12-31 RX ORDER — SODIUM CHLORIDE, SODIUM LACTATE, POTASSIUM CHLORIDE, CALCIUM CHLORIDE 600; 310; 30; 20 MG/100ML; MG/100ML; MG/100ML; MG/100ML
INJECTION, SOLUTION INTRAVENOUS CONTINUOUS
Status: ACTIVE | OUTPATIENT
Start: 2023-12-31 | End: 2023-12-31

## 2023-12-31 RX ADMIN — LATANOPROST 1 DROP: 50 SOLUTION OPHTHALMIC at 22:45

## 2023-12-31 RX ADMIN — FERROUS SULFATE TAB 325 MG (65 MG ELEMENTAL FE) 325 MG: 325 (65 FE) TAB at 10:14

## 2023-12-31 RX ADMIN — SODIUM CHLORIDE, POTASSIUM CHLORIDE, SODIUM LACTATE AND CALCIUM CHLORIDE: 600; 310; 30; 20 INJECTION, SOLUTION INTRAVENOUS at 18:19

## 2023-12-31 RX ADMIN — IOHEXOL 80 ML: 350 INJECTION, SOLUTION INTRAVENOUS at 00:01

## 2023-12-31 RX ADMIN — LOSARTAN POTASSIUM 25 MG: 25 TABLET, FILM COATED ORAL at 10:14

## 2023-12-31 RX ADMIN — OMEPRAZOLE 20 MG: 20 CAPSULE, DELAYED RELEASE ORAL at 10:15

## 2023-12-31 RX ADMIN — AMLODIPINE BESYLATE 2.5 MG: 5 TABLET ORAL at 10:15

## 2023-12-31 RX ADMIN — METRONIDAZOLE 500 MG: 500 INJECTION, SOLUTION INTRAVENOUS at 01:25

## 2023-12-31 RX ADMIN — CEFTRIAXONE SODIUM 2000 MG: 2 INJECTION, POWDER, FOR SOLUTION INTRAMUSCULAR; INTRAVENOUS at 01:23

## 2023-12-31 RX ADMIN — SODIUM CHLORIDE, POTASSIUM CHLORIDE, SODIUM LACTATE AND CALCIUM CHLORIDE: 600; 310; 30; 20 INJECTION, SOLUTION INTRAVENOUS at 02:53

## 2023-12-31 ASSESSMENT — LIFESTYLE VARIABLES
CONSUMPTION TOTAL: NEGATIVE
ON A TYPICAL DAY WHEN YOU DRINK ALCOHOL HOW MANY DRINKS DO YOU HAVE: 0
TOTAL SCORE: 0
EVER FELT BAD OR GUILTY ABOUT YOUR DRINKING: NO
HOW MANY TIMES IN THE PAST YEAR HAVE YOU HAD 5 OR MORE DRINKS IN A DAY: 0
ALCOHOL_USE: NO
TOTAL SCORE: 0
HAVE YOU EVER FELT YOU SHOULD CUT DOWN ON YOUR DRINKING: NO
HAVE PEOPLE ANNOYED YOU BY CRITICIZING YOUR DRINKING: NO
TOTAL SCORE: 0
EVER HAD A DRINK FIRST THING IN THE MORNING TO STEADY YOUR NERVES TO GET RID OF A HANGOVER: NO
AVERAGE NUMBER OF DAYS PER WEEK YOU HAVE A DRINK CONTAINING ALCOHOL: 0

## 2023-12-31 ASSESSMENT — ENCOUNTER SYMPTOMS
CHILLS: 0
VOMITING: 0
TREMORS: 0
FEVER: 0
ORTHOPNEA: 0
COUGH: 0
HEARTBURN: 0
BLURRED VISION: 0
DIAPHORESIS: 0
MYALGIAS: 0
HEADACHES: 0
PALPITATIONS: 0
SINUS PAIN: 0
DIARRHEA: 1
CLAUDICATION: 0
PND: 0
WHEEZING: 0
NAUSEA: 0
SPUTUM PRODUCTION: 0
WEIGHT LOSS: 0
BLOOD IN STOOL: 1
HEMOPTYSIS: 0
WEAKNESS: 1
SPEECH CHANGE: 0
FLANK PAIN: 0
FOCAL WEAKNESS: 0
TINGLING: 0
SHORTNESS OF BREATH: 0
SENSORY CHANGE: 0
ABDOMINAL PAIN: 1
DIZZINESS: 1
DOUBLE VISION: 0
CONSTIPATION: 0
SORE THROAT: 0

## 2023-12-31 ASSESSMENT — COGNITIVE AND FUNCTIONAL STATUS - GENERAL
MOBILITY SCORE: 9
WALKING IN HOSPITAL ROOM: A LOT
CLIMB 3 TO 5 STEPS WITH RAILING: A LOT
PERSONAL GROOMING: A LITTLE
DAILY ACTIVITIY SCORE: 19
TOILETING: A LITTLE
DRESSING REGULAR LOWER BODY CLOTHING: A LITTLE
MOVING TO AND FROM BED TO CHAIR: UNABLE
SUGGESTED CMS G CODE MODIFIER DAILY ACTIVITY: CK
MOVING FROM LYING ON BACK TO SITTING ON SIDE OF FLAT BED: UNABLE
STANDING UP FROM CHAIR USING ARMS: A LOT
DRESSING REGULAR UPPER BODY CLOTHING: A LITTLE
TURNING FROM BACK TO SIDE WHILE IN FLAT BAD: UNABLE
HELP NEEDED FOR BATHING: A LITTLE
SUGGESTED CMS G CODE MODIFIER MOBILITY: CM

## 2023-12-31 ASSESSMENT — GAIT ASSESSMENTS
DEVIATION: BRADYKINETIC;SHUFFLED GAIT
ASSISTIVE DEVICE: HAND HELD ASSIST;FRONT WHEEL WALKER
DISTANCE (FEET): 30
GAIT LEVEL OF ASSIST: MODERATE ASSIST

## 2023-12-31 ASSESSMENT — FIBROSIS 4 INDEX: FIB4 SCORE: 0.62

## 2023-12-31 ASSESSMENT — PATIENT HEALTH QUESTIONNAIRE - PHQ9
2. FEELING DOWN, DEPRESSED, IRRITABLE, OR HOPELESS: NOT AT ALL
1. LITTLE INTEREST OR PLEASURE IN DOING THINGS: NOT AT ALL
SUM OF ALL RESPONSES TO PHQ9 QUESTIONS 1 AND 2: 0

## 2023-12-31 ASSESSMENT — ACTIVITIES OF DAILY LIVING (ADL): TOILETING: INDEPENDENT

## 2023-12-31 ASSESSMENT — PAIN DESCRIPTION - PAIN TYPE
TYPE: ACUTE PAIN

## 2023-12-31 NOTE — ASSESSMENT & PLAN NOTE
Hb is down to 6.9 MCV 63 with serum iron low at 18 and low %sat of 13. She has bleeding external hemorrhoids which could be accounting for this. She did have colonoscopy within the last year in January due to chronic diarrhea which was unrevealing  IV iron ordered  -Iron supplementation with ferrous sulfate 325mg which may help with diarrhea  Hgb improved after RBC transfusion 1/1/24  monitor

## 2023-12-31 NOTE — ED NOTES
Med rec complete per patient  Allergies reviewed.   Patient denies any outpatient antibiotics in the last 30 days.   Anticoagulants taken in the last 14 days? No     Patients preferred [facility]: [facility]

## 2023-12-31 NOTE — ED NOTES
RELIEF RN    PT IS RESTING IN BED. BREATHING IS EVEN AND UNLABORED, SKIN IS WARM AND DRY, VSS, NAD, WILL CONTINUE TO MONITOR. PT GIVEN EXTRA BLANKETS AS SHE WAS VERY COLD.

## 2023-12-31 NOTE — ASSESSMENT & PLAN NOTE
Continue amlodipine and losartan  -She does have lower extremity edema, amlodipine was previously thought to be contributing but it did not improve after discontinuation trial

## 2023-12-31 NOTE — THERAPY
Occupational Therapy   Initial Evaluation     Patient Name: Beth SOW  Age:  83 y.o., Sex:  female  Medical Record #: [id number]  Today's Date: 12/31/2023     Precautions: Fall Risk  Comments: Fecal incontinence at baseline    Assessment  Patient is 83 y.o. female admitted with progressively worsening fatigue, bleeding hemorrhoids, diarrhea with incontinence, and inability to care for self. PMHx of chronic venous insufficiency with chronic lower extremity edema, chronic diarrhea, hemorrhoids, and iron deficiency anemia. Pt seen for OT eval and tx. Pt currently resides with her daughter and son-in-law in a 1-story house and was independent with ADLs and some IADLs.     During OT eval, pt primarily limited by generalized weakness, decreased activity tolerance, balance deficits, and decreased functional mobility causing her to require min A to complete most ADLs, functional mobility, and txfs using FWW. She required increased assist to complete toileting as she has fecal incontinence. Pt requires increased time to complete ADLs and functional mobility due to slow movement patterns, tremulous motions in BUE, and edema in BLE. At this time recommend post-acute placement prior to DC home. Will continue to follow for ongoing acute OT services.     Plan    Occupational Therapy Initial Treatment Plan   Treatment Interventions: Self Care / Activities of Daily Living, Adaptive Equipment, Therapeutic Exercises, Therapeutic Activity  Treatment Frequency: 3 Times per Week  Duration: Until Therapy Goals Met    DC Equipment Recommendations: Unable to determine at this time  Discharge Recommendations: Recommend post-acute placement for additional occupational therapy services prior to discharge home      Objective      Prior Living Situation   Prior Services Intermittent Physical Support for ADL Per Family   Housing / Facility 1 Story House   Steps Into Home 0   Steps In Home 0   Bathroom Set up Walk In Shower;Grab  Bars;Built-In Shower Chair   Equipment Owned Front-Wheel Walker;Single Point Cane;Other (Comments)  (Built in shower bench)   Lives with - Patient's Self Care Capacity Adult Children;Unrelated Adult   Comments Pt currently resides with her daughter and son-in-law. Daughter is currently not working and able to provide assist as needed.   Prior Level of ADL Function   Self Feeding Independent   Grooming / Hygiene Independent   Bathing Independent   Dressing Independent   Toileting Independent   Prior Level of IADL Function   Medication Management Independent   Laundry Dependent   Kitchen Mobility Requires Assist   Finances Requires Assist   Home Management Dependent   Shopping Requires Assist   Prior Level Of Mobility Independent Without Device in Community;Independent Without Device in Home   Driving / Transportation Driving Independent   Leisure Interests Reading;Exercise   Precautions   Precautions Fall Risk   Comments Fecal incontinence at baseline   Vitals   O2 Delivery Device None - Room Air   Pain 0 - 10 Group   Therapist Pain Assessment Post Activity Pain Same as Prior to Activity;Nurse Notified  (Not rated, agreeable to activity)   Cognition    Cognition / Consciousness WDL   Comments Pleasant and cooperative.   Active ROM Upper Body   Active ROM Upper Body  WDL   Dominant Hand Right   Comments Observed during functional tasks   Strength Upper Body   Upper Body Strength  X   Gross Strength Generalized Weakness, Equal Bilaterally.    Balance Assessment   Sitting Balance (Static) Fair   Sitting Balance (Dynamic) Fair -   Standing Balance (Static) Poor +   Standing Balance (Dynamic) Poor   Weight Shift Sitting Fair   Weight Shift Standing Poor   Comments w/FWW   Bed Mobility    Supine to Sit   (Up to chair pre)   Sit to Supine Minimal Assist  (Assist with BLE)   Scooting Minimal Assist   Comments HOB flat, able to bridge to self-adjust in bed   ADL Assessment   Eating Modified Independent   Grooming  Supervision;Seated  (Oral care and face washing)   Lower Body Dressing Minimal Assist  (Demo ability to tailor sit, required assist due to edema in BLE)   Toileting Total Assist   Comments Requires increased time to complete as she has slow movement patterns   6 Clicks Daily Activity Score 19   Functional Mobility   Sit to Stand Minimal Assist   Bed, Chair, Wheelchair Transfer Minimal Assist   Mobility Chair > bed   Edema / Skin Assessment   Comments Edema in BLE   Activity Tolerance   Sitting in Chair Up to chair pre   Sitting Edge of Bed 3 min   Standing 2 min   Comments Limited by decreased activity tolerance   Patient / Family Goals   Patient / Family Goal #1 To get stronger   Short Term Goals   Short Term Goal # 1 Pt will complete ADL txfs with supv   Short Term Goal # 2 Pt will complete standing g/h with supv   Short Term Goal # 3 Pt will complete FB dressing with supv   Short Term Goal # 4 Pt will complete toileting with supv   Education Group   Education Provided Energy Conservation;Role of Occupational Therapist;Activities of Daily Living   Role of Occupational Therapist Patient Response Patient;Acceptance;Explanation;Verbal Demonstration   Energy Conservation Patient Response Patient;Acceptance;Explanation;Verbal Demonstration   ADL Patient Response Patient;Acceptance;Explanation;Verbal Demonstration

## 2023-12-31 NOTE — PROGRESS NOTES
Pt called and stated he is suppose to change his bandages. Pt stated can he get a travel nurse to come change it for him.   Number to be reached at [address]: [phone] Pt to the [id number] via wheelchair. Pt ambulatory 1 person assist to the bathroom. Oriented to room, floor, and care team.

## 2023-12-31 NOTE — ED NOTES
Provided pt incontinence care. Pt has a bowel movement. Provided perineal care and new bed linens. Daughter at bedside.

## 2023-12-31 NOTE — CARE PLAN
The patient is Stable - Low risk of patient condition declining or worsening         Progress made toward(s) clinical / shift goals:    Problem: Knowledge Deficit - Standard  Goal: Patient and family/care givers will demonstrate understanding of plan of care, disease process/condition, diagnostic tests and medications  Description: Target End Date:  1-3 days or as soon as patient condition allows    Document in Patient Education    1.  Patient and family/caregiver oriented to unit, equipment, visitation policy and means for communicating concern  2.  Complete/review Learning Assessment  3.  Assess knowledge level of disease process/condition, treatment plan, diagnostic tests and medications  4.  Explain disease process/condition, treatment plan, diagnostic tests and medications  Outcome: Progressing       Patient is not progressing towards the following goals:N/A

## 2023-12-31 NOTE — ASSESSMENT & PLAN NOTE
Patient with progressive functional decline, poor PO intake, likely in the setting of progressive anemia  PT/OT evaluated, recommended post-acute placement  - SNF referral placed, CM consulted

## 2023-12-31 NOTE — ED TRIAGE NOTES
"Chief Complaint   Patient presents with    Other     PT ARRIVES VIA EMS FOR AN 8 MONTH GI BLEED. PT STATES IT STARTED AGAIN AND SHE IS WEAK. PT ALSO WITH NO SPHINCTER CONTROL. PT STATES ANEMIA. PT STATES A \"BRIGHTER RED STOOL\" WHICH PROMPTED HER TO COME TO ED. [facility]CS 15           PT ARRIVED VIA EMS. SEE ABOVE      Ht 1.651 m (5' 5\")   Wt 52.2 kg (115 lb)   BMI 19.14 kg/m²     "
no

## 2023-12-31 NOTE — ASSESSMENT & PLAN NOTE
Numerous bleeding external hemorrhoids noted on presentation.   Followed by [facility], last seen 12/18/23. Clinic note considers flex sig and discussion of banding at next visit  Hydrocortisone cream to rectum

## 2023-12-31 NOTE — PROGRESS NOTES
Patient admitted after midnight by Dr George and Glenny, please see H&P for full details.    Pt seen and examined by myself, today she is frail appearing and pale, appears chronically ill. Multiple external hemorrhoids. Bilateral pitting edema. States she has had diarrhea for over a year and cannot fix hemorrhoids until diarrhea is resolved. Her daughter helps her at home, but she states wants to get stronger and more independent and is open to post-acute placement if this helps her do so.     Hospital Course:  Beth SOW is a 83 y.o. female w/ h/o chronic venous insufficiency with chronic lower extremity edema, chronic diarrhea and incontinence, hemorrhoids, iron deficiency anemia admitted 12/30/2023 for progressive debility. Over the last week has developed severe fatigue and inability to care for self.     Assessment and plan:  Active Hospital Problems    Diagnosis     *Debility [R53.81]     Colitis [K52.9]     Iron deficiency anemia [D50.9]     Hemorrhoids [K64.9]     Functional diarrhea [K59.1]     Chronic venous insufficiency [I87.2]     Hypertension [I10]      Plan:  - Observe off antibiotics  - maintenance fluids  - 2g Na+ diet  - trial of PRN loperamide   - Iron supplement, watch Hgb and consider transfusion <7  - Resume home antihypertensives  - PT/OT  - Referral for SNF sent    Dispo: Observation, anticipate post-acute    Patient was discussed with bedside RN/KELI Schultz NP

## 2023-12-31 NOTE — ED PROVIDER NOTES
"ED Provider Note    CHIEF COMPLAINT  Chief Complaint   Patient presents with    Other     PT ARRIVES VIA EMS FOR AN 8 MONTH GI BLEED. PT STATES IT STARTED AGAIN AND SHE IS WEAK. PT ALSO WITH NO SPHINCTER CONTROL. PT STATES ANEMIA. PT STATES A \"BRIGHTER RED STOOL\" WHICH PROMPTED HER TO COME TO ED. GCS 15       EXTERNAL RECORDS REVIEWED  Reviewed recent laboratory studies performed 10 days ago by PCP    HPI/NINA  LIMITATION TO HISTORY   Patient is somewhat confused adult daughter provided additional history regarding course of events  OUTSIDE HISTORIAN(S):  Adult daughter provided additional history regarding course of events    Beth SOW is a 83 y.o. female who presents for evaluation of global weakness abdominal pain dark and black but also bright red blood stools, worsening hemorrhoids.  She has severe fatigue and inability to care for self.  Patient was brought in by ambulance.  Apparently she lives with her adult daughter and an attached apartment.  She had previously been able to cooperate with her activities of daily living but over the past week or so she become increasingly debilitated.  There is no report of any fall or injury to the head neck chest abdomen or pelvis.  She has known history of severe hemorrhoids that have failed banding treatment as well as chronic fecal incontinence.  PCP has recently checked her for C. difficile as well as cytotoxin both of which were negative.  She has had poor oral intake    PAST MEDICAL HISTORY   has a past medical history of Anesthesia (1974/2002), Arthritis, Breast cancer (HCC), Cancer (HCC) (2006), CATARACT, Dental disorder, Diverticulitis, GERD (gastroesophageal reflux disease), Heart burn (12/17/13), Hypertension, Indigestion, MEDICAL HOME (6/9/2015), Osteoporosis, Pain, Personal history of venous thrombosis and embolism (12/31/2013), Pneumonia (1984), PVD (peripheral vascular disease) ([facility]), and Unspecified hemorrhagic conditions.    SURGICAL " HISTORY   has a past surgical history that includes appendectomy (1974); tonsillectomy and adenoidectomy (as child); hysterectomy, total abdominal (1974                        ); colonoscopy - endo (12/6/2013); vein stripping (1974/1999/2013); cataract extraction with iol (2003); knee arthroplasty total (12/30/2013); lumpectomy (2006); gastroscopy with biopsy (6/16/2014); egd w/endoscopic ultrasound (6/16/2014); radiation therapy plan simple (2006); knee arthroplasty total (Right, 6/8/2015); ganglion excision (Right, 10/17/2019); colonoscopy with polyp (N/A, 6/13/2020); and colonoscopy,diagnostic (N/A, 9/25/2020).    FAMILY HISTORY  Family History   Problem Relation Age of Onset    Heart Disease Mother     Hypertension Mother     Stroke Mother     Diabetes Mother     Dementia Mother     Arthritis Mother     Diabetes Brother     Dementia Brother     Cancer Father         Colon    Heart Disease Maternal Grandmother     Kidney Disease Maternal Grandmother     Heart Attack Maternal Grandfather     No Known Problems Brother     Hypertension Brother     No Known Problems Brother        SOCIAL HISTORY  Social History     Tobacco Use    Smoking status: Never    Smokeless tobacco: Never   Vaping Use    Vaping Use: Never used   Substance and Sexual Activity    Alcohol use: No    Drug use: No    Sexual activity: Not Currently     Partners: Male     Birth control/protection: Post-Menopausal       CURRENT MEDICATIONS  No current facility-administered medications for this encounter.    Current Outpatient Medications:     amLODIPine (NORVASC) 2.5 MG Tab, TAKE ONE TABLET BY MOUTH ONCE DAILY, Disp: 90 Tablet, Rfl: 0    gabapentin (NEURONTIN) 100 MG Cap, , Disp: , Rfl:     losartan (COZAAR) 25 MG Tab, TAKE ONE TABLET BY MOUTH EVERY DAY, Disp: 100 Tablet, Rfl: 1    omeprazole (PRILOSEC) 20 MG delayed-release capsule, Take 1 Capsule by mouth every day., Disp: 90 Capsule, Rfl: 3    estradiol (ESTRACE) 0.1 MG/GM vaginal cream, USE 1 GRAM  "VAGINALLY 3 TIMES A WEEK, Disp: 127.5 g, Rfl: 1    celecoxib (CELEBREX) 100 MG Cap, Take 1 Capsule by mouth 2 times a day., Disp: 180 Capsule, Rfl: 3    Acetaminophen (TYLENOL ARTHRITIS PAIN PO), Take  by mouth., Disp: , Rfl:     latanoprost (XALATAN) 0.005 % Solution, , Disp: , Rfl:     Cholecalciferol (VITAMIN D) 125 MCG (5000 UT) Cap, Take  by mouth., Disp: , Rfl:     KRILL OIL PO, Take  by mouth., Disp: , Rfl:     Multiple Vitamin (MULTIVITAMIN PO), Take  by mouth., Disp: , Rfl:         ALLERGIES  Allergies   Allergen Reactions    Ibuprofen      Stomach upset    Lisinopril      dizz    Morphine Vomiting    Other Misc      Metals: zinc oxide, vanadium chloride, manganese chloride       PHYSICAL EXAM  VITAL SIGNS: /69   Pulse 87   Temp 37.1 °C (98.8 °F) (Temporal)   Resp 18   Ht 1.651 m (5' 5\")   Wt 52.2 kg (115 lb)   SpO2 93%   BMI 19.14 kg/m²    Pulse ox interpretation: I interpret this pulse ox as normal.  Constitutional: Alert and oriented x 3, no acute distress  HEENT: Atraumatic normocephalic, pupils are equal round reactive to light extraocular movements are intact. The nares is clear, external ears are normal, mouth shows moist mucous membranes normal dentition for age  Neck: Supple, no JVD no tracheal deviation  Cardiovascular: Regular rate and rhythm no murmur rub or gallop 2+ pulses peripherally x4  Thorax & Lungs: No respiratory distress, no wheezes rales or rhonchi, No chest tenderness.   GI: Soft diffuse lower abdominal discomfort there are numerous external nonthrombosed hemorrhoids with slow oozing of blood  Skin: Warm dry no acute rash or lesion  Musculoskeletal: Moving all extremities with full range and 5 of 5 strength no acute  deformity  Neurologic: Cranial nerves III through XII are grossly intact no sensory deficit no cerebellar dysfunction   Psychiatric: Appropriate affect for situation at this time          DIAGNOSTIC STUDIES / PROCEDURES      LABS  Results for orders placed " or performed during the hospital encounter of 12/30/23   COD (ADULT)   Result Value Ref Range    ABO Grouping Only O     Rh Grouping Only POS     Antibody Screen-Cod NEG    CBC WITH DIFFERENTIAL   Result Value Ref Range    WBC 7.9 4.8 - 10.8 K/uL    RBC 3.95 (L) 4.20 - 5.40 M/uL    Hemoglobin 8.2 (L) 12.0 - 16.0 g/dL    Hematocrit 24.8 (L) 37.0 - 47.0 %    MCV 62.8 (L) 81.4 - 97.8 fL    MCH 20.8 (L) 27.0 - 33.0 pg    MCHC 33.1 32.2 - 35.5 g/dL    RDW 41.9 35.9 - 50.0 fL    Platelet Count 227 164 - 446 K/uL    MPV 8.8 (L) 9.0 - 12.9 fL    Neutrophils-Polys 98.20 (H) 44.00 - 72.00 %    Lymphocytes 0.00 (L) 22.00 - 41.00 %    Monocytes 1.80 0.00 - 13.40 %    Eosinophils 0.00 0.00 - 6.90 %    Basophils 0.00 0.00 - 1.80 %    Nucleated RBC 0.00 0.00 - 0.20 /100 WBC    Neutrophils (Absolute) 7.76 (H) 1.82 - 7.42 K/uL    Lymphs (Absolute) 0.00 (L) 1.00 - 4.80 K/uL    Monos (Absolute) 0.14 0.00 - 0.85 K/uL    Eos (Absolute) 0.00 0.00 - 0.51 K/uL    Baso (Absolute) 0.00 0.00 - 0.12 K/uL    NRBC (Absolute) 0.00 K/uL    Hypochromia 1+     Anisocytosis 1+     Microcytosis 1+    COMP METABOLIC PANEL   Result Value Ref Range    Sodium 129 (L) 135 - 145 mmol/L    Potassium 4.4 3.6 - 5.5 mmol/L    Chloride 101 96 - 112 mmol/L    Co2 17 (L) 20 - 33 mmol/L    Anion Gap 11.0 7.0 - 16.0    Glucose 138 (H) 65 - 99 mg/dL    Bun 36 (H) 8 - 22 mg/dL    Creatinine 0.93 0.50 - 1.40 mg/dL    Calcium 7.9 (L) 8.5 - 10.5 mg/dL    Correct Calcium 9.3 8.5 - 10.5 mg/dL    AST(SGOT) 6 (L) 12 - 45 U/L    ALT(SGPT) 13 2 - 50 U/L    Alkaline Phosphatase 115 (H) 30 - 99 U/L    Total Bilirubin 0.5 0.1 - 1.5 mg/dL    Albumin 2.2 (L) 3.2 - 4.9 g/dL    Total Protein 5.6 (L) 6.0 - 8.2 g/dL    Globulin 3.4 1.9 - 3.5 g/dL    A-G Ratio 0.6 g/dL   LIPASE   Result Value Ref Range    Lipase 9 (L) 11 - 82 U/L   PROTHROMBIN TIME   Result Value Ref Range    PT 18.3 (H) 12.0 - 14.6 sec    INR 1.50 (H) 0.87 - 1.13   APTT   Result Value Ref Range    APTT 31.8 24.7 - 36.0  sec   ESTIMATED GFR   Result Value Ref Range    GFR (CKD-EPI) 60 >60 mL/min/1.73 m 2   DIFFERENTIAL MANUAL   Result Value Ref Range    Manual Diff Status PERFORMED    PERIPHERAL SMEAR REVIEW   Result Value Ref Range    Peripheral Smear Review see below    PLATELET ESTIMATE   Result Value Ref Range    Plt Estimation Normal    MORPHOLOGY   Result Value Ref Range    RBC Morphology Present     Poikilocytosis 2+     Ovalocytes 1+     Echinocytes 2+     Toxic Gran Few    EKG (Now)   Result Value Ref Range    Report       [facility] Emergency Dept.    Test Date:  2023  Pt Name:    ABDULAZIZ SOW              Department: ER  MRN:        [id number]                      Room:       [facility]  Gender:     Female                       Technician: [id number]  :        [id number]                   Requested By:ER TRIAGE PROTOCOL  Order #:    [id number]                    Reading MD:    Measurements  Intervals                                Axis  Rate:       73                           P:          0  WY:         0                            QRS:        -65  QRSD:       127                          T:          78  QT:         389  QTc:        429    Interpretive Statements  Atrial fibrillation  Nonspecific IVCD with LAD  Left ventricular hypertrophy  Anterior Q waves, possibly due to LVH  Abnormal T, consider ischemia, lateral leads  ST elevation, consider inferior injury  Compared to ECG 2020 15:58:54  Intraventricular conduction delay now present  T-wave abnormality now present  Possibl e ischemia now present  ST (T wave) deviation now present  Myocardial infarct finding now present  Sinus rhythm no longer present  First degree AV block no longer present  Left anterior fascicular block no longer present        EKG interpretation by me atrial fibrillation LVH noted abnormal T waves possible ischemia  RADIOLOGY  I have independently interpreted the diagnostic imaging associated with this visit and am waiting  the final reading from the radiologist.   My preliminary interpretation is as follows: No bowel obstruction diffuse thickening of the rectum with diverticulosis noted  Radiologist interpretation:   CT-ABDOMEN-PELVIS WITH   Final Result      1.  No evidence of bowel obstruction.      2.  Diffuse thickening of the wall of the rectum and sigmoid colon with diverticulosis present. There is no surrounding inflammatory stranding. Some may represent mild proctosigmoid colitis.      3.  Moderate constipation.      4.  Bibasilar atelectasis with minimal left pleural effusion.      5.  Fatty liver.      6.  Again seen cystic mass involving the uncinate process of the pancreas. Which is unchanged.      7.  Extensive atherosclerotic change of the aorta.      DX-CHEST-PORTABLE (1 VIEW)   Final Result      No acute cardiopulmonary disease.          COURSE & MEDICAL DECISION MAKING    ED Observation Status? No; Patient does not meet criteria for ED Observation.     INITIAL ASSESSMENT, COURSE AND PLAN  Care Narrative:     This is a very pleasant but ill and debilitated 83-year-old who presents here with crampy lower abdominal pain ongoing rectal bleeding severe fatigue and appears unwell.  She had extensive evaluation today.  Here she has rather severe anemia at hemoglobin of 8.2 but somewhat stable from 10 days ago when it was 8.3.  Her albumin is profoundly low at 2.2 and she is severely debilitated.  She is too weak to do any of her activities of daily living.  CT scan of the abdomen pelvis demonstrates proctocolitis without abscess for which we will administer IV Rocephin and Flagyl.  Consultation with the hospitalist service was obtained.  I did consider blood transfusion but it did not need to be emergently ordered or performed.  The patient will be admitted to the hospital service for further treatment and evaluation      ADDITIONAL PROBLEM LIST    DISPOSITION AND DISCUSSIONS  I have discussed management of the patient with  the following physicians and JOSE's: Solitario with admitting hospitalist    Discussion of management with other QHP or appropriate source(s): None    Escalation of care considered, and ultimately not performed: Consider blood transfusion    Barriers to care at this time, including but not limited to: None.     Decision tools and prescription drugs considered including, but not limited to: None.    FINAL DIAGNOSIS  1. Proctocolitis with rectal bleeding        2. Moderate protein-calorie malnutrition (HCC)        3. Iron deficiency anemia due to chronic blood loss              Electronically signed by: Rajiv Hopper M.D., 12/30/2023 7:44 PM

## 2023-12-31 NOTE — ED NOTES
Pt to [facility] via Transport. Pt with all belongings in possession, two bags of clothes and glasses. Pt is medical and on room air. Report has been given to Katie PRAJAPATI.

## 2023-12-31 NOTE — PROGRESS NOTES
4 Eyes Skin Assessment Completed by Katie RN and VICTORINA Huff.    Head WDL  Ears WDL  Nose WDL  Mouth WDL  Neck WDL  Breast/Chest WDL  Shoulder Blades WDL  Spine WDL  (R) Arm/Elbow/Hand WDL  (L) Arm/Elbow/Hand WDL  Abdomen WDL  Groin WDL  Scrotum/Coccyx/Buttocks Redness and Blanching, hemorrhoids  (R) Leg Edema  (L) Leg Edema  (R) Heel/Foot/Toe Edema  (L) Heel/Foot/Toe Edema          Devices In Places Blood Pressure Cuff and Pulse Ox      Interventions In Place Pillows    Possible Skin Injury No    Pictures Uploaded Into Epic N/A  Wound Consult Placed N/A  RN Wound Prevention Protocol Ordered No

## 2023-12-31 NOTE — ASSESSMENT & PLAN NOTE
Mild proctocolitis noted on CT abdomen however her symptoms have been ongoing for a year. Did get IV ceftriaxone/metronidazole in the ED which will be stopped.  -C.diff stool studies negative  -Can consider repeat stool studies though review of PCP/GI notes suggests that she previously had stool work-up which was unremarkable apart from elevated lactoferrin, and her diarrhea is not significantly changed recently

## 2023-12-31 NOTE — ASSESSMENT & PLAN NOTE
Review of vascular clinic notes, she has had this for over a year, previously stopped her calcium channel blocker as it was thought to be contributing but this did not lead to improvement in her leg edema.  -She had echocardiogram performed 10/2022 which showed normal EF, this was after her leg swelling started per review of previous vascular clinic notes  -2g sodium restriction

## 2023-12-31 NOTE — H&P
"[facility] Internal Medicine History & Physical Note    Date of Service  12/31/2023    [facility] Team: PHOEBE   Attending: Agnel Murrieta M.d.  Senior Resident: Dr. George  Contact Number: [phone]    Primary Care Physician  Pancho Cole III, M.D.      Code Status  Prior    Chief Complaint  Chief Complaint   Patient presents with    Other     PT ARRIVES VIA EMS FOR AN 8 MONTH GI BLEED. PT STATES IT STARTED AGAIN AND SHE IS WEAK. PT ALSO WITH NO SPHINCTER CONTROL. PT STATES ANEMIA. PT STATES A \"BRIGHTER RED STOOL\" WHICH PROMPTED HER TO COME TO ED. [facility] 15       History of Presenting Illness (HPI):   Beth SOW is a 83 y.o. female with PMHx chronic venous insufficiency with chronic lower extremity edema, chronic diarrhea, hemorrhoids, iron deficiency anemia, who presented 12/30/2023 with progressively worsening fatigue, bleeding hemorrhoids, diarrhea with incontinence, inability to care for self. She has had chronic diarrhea for over a year, lately has had incontinence as well and has not been able to make it to the bathroom before soiling herself. She has had progressive fatigue over several months, worse over the last few weeks. She has had persistent bright red bleeding and pain from hemorrhoids, which she states she was told she cannot get treated until her diarrhea resolves. She lives with her daughter and son-in-law, they were concerned about her worsening condition, inability to care for herself, and her incontinence. She has intermittent crampy abdominal pain which seems to be associated with food intake. She has tried taking loperamide for her diarrhea without benefit. Her PCP ordered C.diff studies which were negative on 12/20/2023. She did have colonoscopy earlier this year 1/25/2023 to assess etiology of her chronic diarrhea, did not show any significant findings.     I discussed the plan of care with patient.    Review of Systems  Review of Systems   Constitutional:  Positive for malaise/fatigue. " Negative for chills, diaphoresis, fever and weight loss.   HENT:  Negative for congestion, sinus pain and sore throat.    Eyes:  Negative for blurred vision and double vision.   Respiratory:  Negative for cough, hemoptysis, sputum production, shortness of breath and wheezing.    Cardiovascular:  Positive for leg swelling. Negative for chest pain, palpitations, orthopnea, claudication and PND.   Gastrointestinal:  Positive for abdominal pain, blood in stool and diarrhea. Negative for constipation, heartburn, melena, nausea and vomiting.   Genitourinary:  Negative for dysuria, flank pain, frequency, hematuria and urgency.   Musculoskeletal:  Positive for joint pain. Negative for myalgias.   Skin:  Negative for rash.   Neurological:  Positive for dizziness and weakness. Negative for tingling, tremors, sensory change, speech change, focal weakness and headaches.       Past Medical History   has a past medical history of Anesthesia (1974/2002), Arthritis, Breast cancer (HCC), Cancer ([facility]) (2006), CATARACT, Dental disorder, Diverticulitis, GERD (gastroesophageal reflux disease), Heart burn (12/17/13), Hypertension, Indigestion, MEDICAL HOME (6/9/2015), Osteoporosis, Pain, Personal history of venous thrombosis and embolism (12/31/2013), Pneumonia (1984), PVD (peripheral vascular disease) (HCC), and Unspecified hemorrhagic conditions.    Surgical History   has a past surgical history that includes appendectomy (1974); tonsillectomy and adenoidectomy (as child); hysterectomy, total abdominal (1974                        ); colonoscopy - endo (12/6/2013); vein stripping (1974/1999/2013); cataract extraction with iol (2003); knee arthroplasty total (12/30/2013); lumpectomy (2006); gastroscopy with biopsy (6/16/2014); egd w/endoscopic ultrasound (6/16/2014); pr radiation therapy plan simple (2006); knee arthroplasty total (Right, 6/8/2015); ganglion excision (Right, 10/17/2019); colonoscopy with polyp (N/A, 6/13/2020); and pr  colonoscopy,diagnostic (N/A, 9/25/2020).     Family History  family history includes Arthritis in her mother; Cancer in her father; Dementia in her brother and mother; Diabetes in her brother and mother; Heart Attack in her maternal grandfather; Heart Disease in her maternal grandmother and mother; Hypertension in her brother and mother; Kidney Disease in her maternal grandmother; No Known Problems in her brother and brother; Stroke in her mother.   Family history reviewed with patient.     Social History  Tobacco: Never smoker. Smoked 1 pack as a teenager.   Alcohol: Denies  Recreational drugs (illegal or prescription): Denies  Employment: Retired  Living Situation: lives with her daughter and son-in-law  Recent Travel: Denies  Primary Care Provider: Reviewed Recent PCP and vascular clinic notes  Other (stressors, spirituality, exposures): NA    Allergies  Allergies   Allergen Reactions    Ibuprofen      Stomach upset    Lisinopril      dizz    Morphine Vomiting    Other Misc      Metals: zinc oxide, vanadium chloride, manganese chloride       Medications  Prior to Admission Medications   Prescriptions Last Dose Informant Patient Reported? Taking?   Acetaminophen (TYLENOL ARTHRITIS PAIN PO)   Yes No   Sig: Take  by mouth.   Cholecalciferol (VITAMIN D) 125 MCG (5000 UT) Cap   Yes No   Sig: Take  by mouth.   KRILL OIL PO   Yes No   Sig: Take  by mouth.   Multiple Vitamin (MULTIVITAMIN PO)   Yes No   Sig: Take  by mouth.   amLODIPine (NORVASC) 2.5 MG Tab   No No   Sig: TAKE ONE TABLET BY MOUTH ONCE DAILY   celecoxib (CELEBREX) 100 MG Cap   No No   Sig: Take 1 Capsule by mouth 2 times a day.   estradiol (ESTRACE) 0.1 MG/GM vaginal cream   No No   Sig: USE 1 GRAM VAGINALLY 3 TIMES A WEEK   gabapentin (NEURONTIN) 100 MG Cap   Yes No   latanoprost (XALATAN) 0.005 % Solution   Yes No   losartan (COZAAR) 25 MG Tab   No No   Sig: TAKE ONE TABLET BY MOUTH EVERY DAY   omeprazole (PRILOSEC) 20 MG delayed-release capsule   No No    Sig: Take 1 Capsule by mouth every day.      Facility-Administered Medications: None       Physical Exam  Temp:  [37.1 °C (98.8 °F)] 37.1 °C (98.8 °F)  Pulse:  [62-87] 76  Resp:  [12-18] 12  BP: ()/(54-75) 126/75  SpO2:  [92 %-99 %] 99 %  Blood Pressure : 126/75   Temperature: 37.1 °C (98.8 °F)   Pulse: 76   Respiration: 12   Pulse Oximetry: 99 %       Physical Exam  Constitutional:       General: She is not in acute distress.     Appearance: Normal appearance. She is not ill-appearing or toxic-appearing.      Comments: Pallid-appearing   HENT:      Head: Normocephalic and atraumatic.      Mouth/Throat:      Mouth: Mucous membranes are moist.      Pharynx: Oropharynx is clear. No oropharyngeal exudate or posterior oropharyngeal erythema.   Eyes:      General: No scleral icterus.     Extraocular Movements: Extraocular movements intact.      Pupils: Pupils are equal, round, and reactive to light.      Comments: Pale conjunctivae   Cardiovascular:      Rate and Rhythm: Normal rate and regular rhythm.      Pulses: Normal pulses.      Heart sounds: No murmur heard.  Pulmonary:      Effort: Pulmonary effort is normal. No respiratory distress.      Breath sounds: Normal breath sounds. No stridor. No wheezing, rhonchi or rales.   Abdominal:      General: Abdomen is flat. There is no distension.      Palpations: Abdomen is soft.      Tenderness: There is no abdominal tenderness. There is no right CVA tenderness, left CVA tenderness, guarding or rebound.   Genitourinary:     Comments: Perianal skin examined with multiple external hemorrhoids  Musculoskeletal:         General: Swelling present. Normal range of motion.      Cervical back: Neck supple.      Right lower leg: Edema present.      Left lower leg: Edema present.      Comments: 2+ bilateral lower extremity pitting edema slightly worse on the left   Skin:     General: Skin is warm and dry.      Capillary Refill: Capillary refill takes less than 2 seconds.       "Coloration: Skin is pale.      Findings: No bruising, erythema or rash.   Neurological:      General: No focal deficit present.      Mental Status: She is alert and oriented to person, place, and time.      Sensory: No sensory deficit.      Motor: No weakness.   Psychiatric:         Mood and Affect: Mood normal.         Behavior: Behavior normal.         Thought Content: Thought content normal.         Judgment: Judgment normal.       Laboratory:  Recent Labs     12/30/23 1922   WBC 7.9   RBC 3.95*   HEMOGLOBIN 8.2*   HEMATOCRIT 24.8*   MCV 62.8*   MCH 20.8*   MCHC 33.1   RDW 41.9   PLATELETCT 227   MPV 8.8*     Recent Labs     12/30/23 1922   SODIUM 129*   POTASSIUM 4.4   CHLORIDE 101   CO2 17*   GLUCOSE 138*   BUN 36*   CREATININE 0.93   CALCIUM 7.9*     Recent Labs     12/30/23 1922   ALTSGPT 13   ASTSGOT 6*   ALKPHOSPHAT 115*   TBILIRUBIN 0.5   LIPASE 9*   GLUCOSE 138*     Recent Labs     12/30/23 1922   APTT 31.8   INR 1.50*     No results for input(s): \"NTPROBNP\" in the last 72 hours.      No results for input(s): \"TROPONINT\" in the last 72 hours.    Imaging:  CT-ABDOMEN-PELVIS WITH   Final Result      1.  No evidence of bowel obstruction.      2.  Diffuse thickening of the wall of the rectum and sigmoid colon with diverticulosis present. There is no surrounding inflammatory stranding. Some may represent mild proctosigmoid colitis.      3.  Moderate constipation.      4.  Bibasilar atelectasis with minimal left pleural effusion.      5.  Fatty liver.      6.  Again seen cystic mass involving the uncinate process of the pancreas. Which is unchanged.      7.  Extensive atherosclerotic change of the aorta.      DX-CHEST-PORTABLE (1 VIEW)   Final Result      No acute cardiopulmonary disease.          Assessment/Plan:  Problem Representation:   84 y/o F presents with progressive weakness, fatigue, in the setting of chronic diarrhea and anemia with bloody stools, bleeding hemorrhoids.     I anticipate this " patient is appropriate for observation status at this time because anemia at baseline    Patient will need a Med/Surg bed on MEDICAL service .  The need is secondary to debility.    * Debility  Assessment & Plan  Patient with progressive functional decline, poor PO intake, likely in the setting of progressive anemia  -PT/OT consult  -Gentle IV fluids; she has lower extremity edema but this is chronic and appears 2/2 chronic venous insufficiency as opposed to volume overload from heart failure based on review of outpatient visit records    Iron deficiency anemia  Assessment & Plan  Initial Hgb of 8.3 relatively stable since 12/20/2023. Iron low at 11, ferritin of 140. She has bleeding external hemorrhoids which could be accounting for this. She did have colonoscopy within the last year in January due to chronic diarrhea which was unrevealing  -Iron supplementation with ferrous sulfate 325mg    Hemorrhoids  Assessment & Plan  Numerous bleeding external hemorrhoids noted on presentation. Per patient she was told she cannot have a procedure to address these while she has persistent diarrhea.    Colitis- (present on admission)  Assessment & Plan  Mild proctocolitis noted on CT abdomen however her symptoms have been ongoing for many months now. Did get IV ceftriaxone/metronidazole in the ED  -C.diff stool studies negative  -Can consider repeat stool studies though review of PCP notes suggests that she previously had stool work-up which was unremarkable apart from elevated lactoferrin, and her diarrhea is not significantly changed recently    Functional diarrhea- (present on admission)  Assessment & Plan  Ongoing for over a year now. C. Diff studies negative on 12/20/2023. She did have mild proctocolitis noted on CT of the abdomen. She received IV ceftriaxone and metronidazole while in the ED. Review of PCP notes, she had EGD/colonoscopy (1/25/2023) to work up this issue which were unrevealing, did have an elevated stool  lactoferrin but other studies normal.   -Lower suspicion this is an acute infectious process given lengthy time course  -Will attempt symptomatic management with loperamide    Chronic venous insufficiency- (present on admission)  Assessment & Plan  Review of vascular clinic notes, she has had this for over a year, previously stopped her calcium channel blocker as it was thought to be contributing but this did not lead to improvement in her leg edema.  -She had echocardiogram performed 10/2022 which showed normal EF, this was after her leg swelling started per review of previous vascular clinic notes  -2g sodium restriction  -Likely worsened with     Hypertension- (present on admission)  Assessment & Plan  Continue amlodipine and losartan  -She does have lower extremity edema, amlodipine was previously thought to be contributing but it did not improve after discontinuation trial        VTE prophylaxis: pharmacologic prophylaxis contraindicated due to bleeding

## 2023-12-31 NOTE — ED NOTES
Rounded on patient, pt resting comfortably in bed, pt reports no needs at this time. Awaiting labs results

## 2023-12-31 NOTE — THERAPY
"Physical Therapy   Initial Evaluation     Patient Name: Beth SOW  Age:  83 y.o., Sex:  female  Medical Record #: [id number]  Today's Date: 12/31/2023     Precautions  Precautions: (P) Fall Risk  Comments: (P) Fecal incontinence at baseline    Assessment  Beth SOW is a 83 y.o. female with PMHx chronic venous insufficiency with chronic lower extremity edema, chronic diarrhea, hemorrhoids, iron deficiency anemia, who presented 12/30/2023 with progressively worsening fatigue, bleeding hemorrhoids, diarrhea with incontinence, inability to care for self.  Patient presents to PT eval with pain, impaired balance, strength, motor-planning and fecal incontinence. Patient needing Min to ModA for all mobility and is found laying in bed in a soiled diaper, unaware. She was indep 3 weeks PTA and lives with her daughter. She will benefit from placement for further therapy at this time.     Plan    Physical Therapy Initial Treatment Plan   Treatment Plan : Bed Mobility, Equipment, Gait Training, Neuro Re-Education / Balance, Self Care / Home Evaluation, Therapeutic Activities, Therapeutic Exercise  Treatment Frequency: 4 Times per Week  Duration: Until Therapy Goals Met    DC Equipment Recommendations: Unable to determine at this time  Discharge Recommendations: Recommend post-acute placement for additional physical therapy services prior to discharge home       Subjective    \"I'm so cold, but I know I need to get up\"     Objective       12/31/23 0915   Precautions   Precautions Fall Risk   Comments fecal incontinence at baseline   Pain 0 - 10 Group   Location Back   Therapist Pain Assessment 5   Prior Living Situation   Prior Services Intermittent Physical Support for ADL Per Family   Housing / Facility 1 Story House   Steps Into Home 0   Steps In Home 0   Bathroom Set up Walk In Shower;Grab Bars;Shower Chair   Equipment Owned 4-Wheel Walker;Single Point Cane   Lives with - Patient's Self Care Capacity " Adult Children   Comments Patient lives with her daughter and her AMANDA. Her dtr provides assist as needed and does not work. Patient reports taht she still drives and has not needed assist for her ADLs   Prior Level of Functional Mobility   Bed Mobility Independent   Transfer Status Independent   Ambulation Independent   Assistive Devices Used None   Comments Patient reports that she usually walks at the [facility] with a friend of hers.However, for the last 3 weeks she has been homebound 2/2 weakness and diarrhea   History of Falls   History of Falls No   Cognition    Cognition / Consciousness X   Comments Patient is pleasant and cooperative, but lacks insight into deficits.   Active ROM Upper Body   Active ROM Upper Body  WDL   Strength Upper Body   Upper Body Strength  X   Gross Strength Generalized Weakness, Equal Bilaterally.    Active ROM Lower Body    Active ROM Lower Body  WDL   Strength Lower Body   Lower Body Strength  X   Gross Strength Generalized Weakness, Equal Bilaterally   Sensation Lower Body   Lower Extremity Sensation   X   Other Treatments   Other Treatments Provided extensive discussion about pathologies of bed rest and DC recs   Balance Assessment   Sitting Balance (Static) Fair   Sitting Balance (Dynamic) Fair -   Standing Balance (Static) Poor +   Standing Balance (Dynamic) Poor   Weight Shift Sitting Fair   Weight Shift Standing Poor   Comments w/FWW   Bed Mobility    Supine to Sit Minimal Assist   Scooting Minimal Assist   Rolling Minimal Assist to Rt.   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   Turning over in bed (including adjusting bedclothes, sheets and blankets)? 1   Sitting down on and standing up from a chair with arms (e.g., wheelchair, bedside commode, etc.) 1   Moving from lying on back to sitting on the side of the bed? 1   How much help from another person does the patient currently need...   Moving to and from a bed to a chair (including a wheelchair)? 2   Need to walk in a hospital room? 2   Climbing 3-5 steps with a railing? 2   6 clicks Mobility Score 9   Activity Tolerance   Sitting in Chair post session   Sitting Edge of Bed 5 min   Standing 8 min   Edema / Skin Assessment   Comments B LE with 2+ pitting edema   Patient / Family Goals    Patient / Family Goal #1 to go home   Short Term Goals    Short Term Goal # 1 in 6 visits patient will demo all bed mobility indep for safe DC   Short Term Goal # 2 in 6 visits patient will demo all functional transfers with Sup and LRAD for safe DC   Short Term Goal # 3 in 6 visits patient will ambulate 200' sup w/LRAD for safe DC   Education Group   Education Provided Role of Physical Therapist;Gait Training;Use of Assistive Device   Role of Physical Therapist Patient Response Patient;Acceptance;Explanation;Demonstration;Verbal Demonstration;Action Demonstration   Gait Training Patient Response Patient;Acceptance;Explanation;Demonstration;Verbal Demonstration;Action Demonstration   Use of Assistive Device Patient Response Patient;Acceptance;Explanation;Demonstration;Verbal Demonstration;Action Demonstration   Physical Therapy Initial Treatment Plan    Treatment Plan  Bed Mobility;Equipment;Gait Training;Neuro Re-Education / Balance;Self Care / Home Evaluation;Therapeutic  Activities;Therapeutic Exercise   Treatment Frequency 4 Times per Week   Duration Until Therapy Goals Met   Problem List    Problems Pain;Impaired Bed Mobility;Impaired Transfers;Impaired Ambulation;Functional Strength Deficit;Impaired Balance;Decreased Activity Tolerance;Safety Awareness Deficits / Cognition;Motor Planning / Sequencing   Anticipated Discharge Equipment and Recommendations   DC Equipment Recommendations Unable to determine at this time   Discharge Recommendations Recommend post-acute placement for additional physical therapy services prior to discharge home     Karolyn Koroma, PT, DPT, GCS

## 2023-12-31 NOTE — ASSESSMENT & PLAN NOTE
Ongoing for over a year now. She reports hourly diarrhea. C. Diff studies negative on 12/20/2023. She did have mild proctocolitis noted on CT of the abdomen.   Extensive workup with PCP and [facility] Dr. Pavon. Has had stool studies, cdiff test, and colonoscopy with biopsy. Did have an elevated stool lactoferrin but other studies normal.   -Lower suspicion this is an acute infectious process given lengthy time course  -Will attempt symptomatic management with loperamide as well as lomotil.   No improvement with hydrocortisone suppository, patient requests to discontinue it

## 2024-01-01 PROBLEM — N89.8 VAGINAL DISCHARGE: Status: RESOLVED | Noted: 2023-12-31 | Resolved: 2024-01-01

## 2024-01-01 LAB
ANION GAP SERPL CALC-SCNC: 8 MMOL/L (ref 7–16)
BUN SERPL-MCNC: 28 MG/DL (ref 8–22)
CALCIUM SERPL-MCNC: 7.1 MG/DL (ref 8.5–10.5)
CHLORIDE SERPL-SCNC: 103 MMOL/L (ref 96–112)
CO2 SERPL-SCNC: 20 MMOL/L (ref 20–33)
CREAT SERPL-MCNC: 0.61 MG/DL (ref 0.5–1.4)
ERYTHROCYTE [DISTWIDTH] IN BLOOD BY AUTOMATED COUNT: 42.1 FL (ref 35.9–50)
GFR SERPLBLD CREATININE-BSD FMLA CKD-EPI: 87 ML/MIN/1.73 M 2
GLUCOSE SERPL-MCNC: 111 MG/DL (ref 65–99)
HCT VFR BLD AUTO: 21.3 % (ref 37–47)
HGB BLD-MCNC: 6.9 G/DL (ref 12–16)
MCH RBC QN AUTO: 20.5 PG (ref 27–33)
MCHC RBC AUTO-ENTMCNC: 32.4 G/DL (ref 32.2–35.5)
MCV RBC AUTO: 63.4 FL (ref 81.4–97.8)
PLATELET # BLD AUTO: 173 K/UL (ref 164–446)
PMV BLD AUTO: 9 FL (ref 9–12.9)
POTASSIUM SERPL-SCNC: 3.7 MMOL/L (ref 3.6–5.5)
RBC # BLD AUTO: 3.36 M/UL (ref 4.2–5.4)
SODIUM SERPL-SCNC: 131 MMOL/L (ref 135–145)
WBC # BLD AUTO: 5.3 K/UL (ref 4.8–10.8)

## 2024-01-01 PROCEDURE — 36415 COLL VENOUS BLD VENIPUNCTURE: CPT

## 2024-01-01 PROCEDURE — P9016 RBC LEUKOCYTES REDUCED: HCPCS

## 2024-01-01 PROCEDURE — 51798 US URINE CAPACITY MEASURE: CPT

## 2024-01-01 PROCEDURE — 700105 HCHG RX REV CODE 258: Performed by: HOSPITALIST

## 2024-01-01 PROCEDURE — 700102 HCHG RX REV CODE 250 W/ 637 OVERRIDE(OP): Performed by: HOSPITALIST

## 2024-01-01 PROCEDURE — 36430 TRANSFUSION BLD/BLD COMPNT: CPT

## 2024-01-01 PROCEDURE — 99233 SBSQ HOSP IP/OBS HIGH 50: CPT | Performed by: HOSPITALIST

## 2024-01-01 PROCEDURE — A9270 NON-COVERED ITEM OR SERVICE: HCPCS

## 2024-01-01 PROCEDURE — 86923 COMPATIBILITY TEST ELECTRIC: CPT

## 2024-01-01 PROCEDURE — A9270 NON-COVERED ITEM OR SERVICE: HCPCS | Performed by: HOSPITALIST

## 2024-01-01 PROCEDURE — 700111 HCHG RX REV CODE 636 W/ 250 OVERRIDE (IP): Mod: JZ | Performed by: HOSPITALIST

## 2024-01-01 PROCEDURE — 30233N1 TRANSFUSION OF NONAUTOLOGOUS RED BLOOD CELLS INTO PERIPHERAL VEIN, PERCUTANEOUS APPROACH: ICD-10-PCS | Performed by: STUDENT IN AN ORGANIZED HEALTH CARE EDUCATION/TRAINING PROGRAM

## 2024-01-01 PROCEDURE — 700102 HCHG RX REV CODE 250 W/ 637 OVERRIDE(OP)

## 2024-01-01 PROCEDURE — 80048 BASIC METABOLIC PNL TOTAL CA: CPT

## 2024-01-01 PROCEDURE — 85027 COMPLETE CBC AUTOMATED: CPT

## 2024-01-01 PROCEDURE — G0378 HOSPITAL OBSERVATION PER HR: HCPCS

## 2024-01-01 PROCEDURE — 96367 TX/PROPH/DG ADDL SEQ IV INF: CPT

## 2024-01-01 RX ORDER — DIPHENOXYLATE HYDROCHLORIDE AND ATROPINE SULFATE 2.5; .025 MG/1; MG/1
1 TABLET ORAL 4 TIMES DAILY PRN
Status: DISCONTINUED | OUTPATIENT
Start: 2024-01-01 | End: 2024-01-04 | Stop reason: HOSPADM

## 2024-01-01 RX ADMIN — OMEPRAZOLE 20 MG: 20 CAPSULE, DELAYED RELEASE ORAL at 06:15

## 2024-01-01 RX ADMIN — LOSARTAN POTASSIUM 25 MG: 25 TABLET, FILM COATED ORAL at 06:16

## 2024-01-01 RX ADMIN — DIPHENOXYLATE HYDROCHLORIDE AND ATROPINE SULFATE 1 TABLET: 2.5; .025 TABLET ORAL at 16:04

## 2024-01-01 RX ADMIN — SODIUM CHLORIDE 250 MG: 9 INJECTION, SOLUTION INTRAVENOUS at 19:57

## 2024-01-01 RX ADMIN — FERROUS SULFATE TAB 325 MG (65 MG ELEMENTAL FE) 325 MG: 325 (65 FE) TAB at 09:05

## 2024-01-01 RX ADMIN — LATANOPROST 1 DROP: 50 SOLUTION OPHTHALMIC at 21:08

## 2024-01-01 RX ADMIN — AMLODIPINE BESYLATE 2.5 MG: 5 TABLET ORAL at 06:15

## 2024-01-01 ASSESSMENT — ENCOUNTER SYMPTOMS
WEIGHT LOSS: 1
WEAKNESS: 1
DIARRHEA: 1

## 2024-01-01 ASSESSMENT — PAIN DESCRIPTION - PAIN TYPE
TYPE: ACUTE PAIN

## 2024-01-01 NOTE — CARE PLAN
The patient is Stable - Low risk of patient condition declining or worsening    Shift Goals  Clinical Goals: Maintain skin integrity and Bladder scan  Patient Goals: Rest  Family Goals: KATIE    Progress made toward(s) clinical / shift goals:      Patient is not progressing towards the following goals:      Problem: Knowledge Deficit - Standard  Goal: Patient and family/care givers will demonstrate understanding of plan of care, disease process/condition, diagnostic tests and medications  Outcome: Not Progressing     Problem: Urinary Elimination  Goal: Establish and maintain regular urinary output  Outcome: Not Progressing   Patient states that she is not able to urinate. Bladder scan and straight cath done to promote urinary elimination.

## 2024-01-01 NOTE — DISCHARGE PLANNING
Received Choice form at [phone]  Agency/Facility Name: [facility]  Referral sent per Choice form @ [phone]

## 2024-01-01 NOTE — ASSESSMENT & PLAN NOTE
White-green vaginal discharge   Denies symptoms, no itching or pain  No odor noted  Denies current sexual activity  - Send wet prep

## 2024-01-01 NOTE — PROGRESS NOTES
Hospital Medicine Daily Progress Note    Date of Service  1/1/2024    Chief Complaint  Beth SOW is a 83 y.o. female admitted 12/30/2023 with weakness.    Hospital Course  Ms. Sow is a 83 y.o. female with PMHx chronic venous insufficiency with chronic lower extremity edema, chronic diarrhea, hemorrhoids, iron deficiency anemia, who presented 12/30/2023 with progressively worsening fatigue, bleeding hemorrhoids, diarrhea with incontinence, inability to care for self. She has had chronic diarrhea for over a year, lately has had incontinence as well and has not been able to make it to the bathroom before soiling herself. She has had progressive fatigue over several months, worse over the last few weeks. She has had persistent bright red bleeding and pain from hemorrhoids, which she states she was told she cannot get treated until her diarrhea resolves. She lives with her daughter and son-in-law, they were concerned about her worsening condition, inability to care for herself, and her incontinence. She has intermittent crampy abdominal pain which seems to be associated with food intake. She has tried taking loperamide for her diarrhea without benefit. Her PCP ordered C.diff studies which were negative on 12/20/2023. She did have colonoscopy earlier this year 1/25/2023 to assess etiology of her chronic diarrhea, did not show any significant findings.      Interval Problem Update  1/1/24: Ms. Sow was seen on the medical floor. Iron studies are consistent with iron deficiency. Hn 6.9 thus one unit RBCs ordered. She notes she has been experiencing diarrhea hourly for months.     I have discussed this patient's plan of care and discharge plan at IDT rounds today with Case Management, Nursing, Nursing leadership, and other members of the IDT team.    Consultants/Specialty  none    Code Status  DNAR/DNI    Disposition  The patient is not medically cleared for discharge to home or a post-acute  facility.  Anticipate discharge to: skilled nursing facility    I have placed the appropriate orders for post-discharge needs.    Review of Systems  Review of Systems   Constitutional:  Positive for malaise/fatigue and weight loss.   Gastrointestinal:  Positive for diarrhea.   Neurological:  Positive for weakness.   All other systems reviewed and are negative.       Physical Exam  Temp:  [36.1 °C (97 °F)-36.4 °C (97.5 °F)] 36.1 °C (97 °F)  Pulse:  [60-76] 60  Resp:  [16-18] 18  BP: (100-128)/(53-80) 103/62  SpO2:  [94 %-98 %] 94 %    Physical Exam  Vitals and nursing note reviewed.   HENT:      Head:      Comments: Temporal wasting  Cardiovascular:      Rate and Rhythm: Normal rate and regular rhythm.   Pulmonary:      Effort: Pulmonary effort is normal.      Breath sounds: Normal breath sounds.   Abdominal:      General: There is distension.      Tenderness: There is no abdominal tenderness.   Musculoskeletal:      Right lower leg: Edema present.      Left lower leg: Edema present.   Neurological:      General: No focal deficit present.      Mental Status: She is oriented to person, place, and time.   Psychiatric:         Behavior: Behavior normal.         Fluids    Intake/Output Summary (Last 24 hours) at 1/1/2024 0944  Last data filed at 12/31/2023 2100  Gross per 24 hour   Intake 120 ml   Output 750 ml   Net -630 ml       Laboratory  Recent Labs     12/30/23 1922 12/31/23  1008 01/01/24  0158   WBC 7.9 6.3 5.3   RBC 3.95* 3.77* 3.36*   HEMOGLOBIN 8.2* 7.9* 6.9*   HEMATOCRIT 24.8* 23.4* 21.3*   MCV 62.8* 62.1* 63.4*   MCH 20.8* 21.0* 20.5*   MCHC 33.1 33.8 32.4   RDW 41.9 41.7 42.1   PLATELETCT 227 204 173   MPV 8.8* 8.7* 9.0     Recent Labs     12/30/23 1922 12/31/23  1008 01/01/24  0158   SODIUM 129* 130* 131*   POTASSIUM 4.4 3.8 3.7   CHLORIDE 101 102 103   CO2 17* 19* 20   GLUCOSE 138* 80 111*   BUN 36* 30* 28*   CREATININE 0.93 0.66 0.61   CALCIUM 7.9* 7.4* 7.1*     Recent Labs     12/30/23 1922   APTT  31.8   INR 1.50*               Imaging  CT-ABDOMEN-PELVIS WITH   Final Result      1.  No evidence of bowel obstruction.      2.  Diffuse thickening of the wall of the rectum and sigmoid colon with diverticulosis present. There is no surrounding inflammatory stranding. Some may represent mild proctosigmoid colitis.      3.  Moderate constipation.      4.  Bibasilar atelectasis with minimal left pleural effusion.      5.  Fatty liver.      6.  Again seen cystic mass involving the uncinate process of the pancreas. Which is unchanged.      7.  Extensive atherosclerotic change of the aorta.      DX-CHEST-PORTABLE (1 VIEW)   Final Result      No acute cardiopulmonary disease.           Assessment/Plan  * Functional diarrhea- (present on admission)  Assessment & Plan  Ongoing for over a year now. She reports hourly diarrhea. C. Diff studies negative on 12/20/2023. She did have mild proctocolitis noted on CT of the abdomen.   Extensive workup with PCP and [facility] Dr. Pavon. Has had stool studies, cdiff test, and colonoscopy with biopsy. Did have an elevated stool lactoferrin but other studies normal.   -Lower suspicion this is an acute infectious process given lengthy time course  -Will attempt symptomatic management with loperamide as well as lomotil.     Debility- (present on admission)  Assessment & Plan  Patient with progressive functional decline, poor PO intake, likely in the setting of progressive anemia  PT/OT evaluated, recommended post-acute placement  - SNF referral placed, CM consulted    Hemorrhoids  Assessment & Plan  Numerous bleeding external hemorrhoids noted on presentation.   Followed by [facility], last seen 12/18/23. Clinic note considers flex sig and discussion of banding at next visit    Iron deficiency anemia- (present on admission)  Assessment & Plan  Hb is down to 6.9 MCV 63 with serum iron low at 18 and low %sat of 13. She has bleeding external hemorrhoids  which could be accounting for this. She did have colonoscopy within the last year in January due to chronic diarrhea which was unrevealing  IV iron ordered  -Iron supplementation with ferrous sulfate 325mg which may help with diarrhea  One unit of RBCs ordered and check Hb in the morning    Colitis- (present on admission)  Assessment & Plan  Mild proctocolitis noted on CT abdomen however her symptoms have been ongoing for a year. Did get IV ceftriaxone/metronidazole in the ED which will be stopped.  -C.diff stool studies negative  -Can consider repeat stool studies though review of PCP/GI notes suggests that she previously had stool work-up which was unremarkable apart from elevated lactoferrin, and her diarrhea is not significantly changed recently    Chronic venous insufficiency- (present on admission)  Assessment & Plan  Review of vascular clinic notes, she has had this for over a year, previously stopped her calcium channel blocker as it was thought to be contributing but this did not lead to improvement in her leg edema.  -She had echocardiogram performed 10/2022 which showed normal EF, this was after her leg swelling started per review of previous vascular clinic notes  -2g sodium restriction    Hypertension- (present on admission)  Assessment & Plan  Continue amlodipine and losartan  -She does have lower extremity edema, amlodipine was previously thought to be contributing but it did not improve after discontinuation trial         VTE prophylaxis:   SCDs/TEDs      I have performed a physical exam and reviewed and updated ROS and Plan today (1/1/2024). In review of yesterday's note (12/31/2023), there are no changes except as documented above.

## 2024-01-01 NOTE — PROGRESS NOTES
4 Eyes Skin Assessment Completed by Delphine RN and Karen RN.    Head WDL  Ears WDL  Nose WDL  Mouth WDL  Neck WDL  Breast/Chest WDL  Shoulder Blades WDL  Upper Back black spot  Spine WDL  (R) Arm/Elbow/Hand Redness  (L) Arm/Elbow/Hand WDL  Abdomen WDL  Groin Redness  Scrotum/Coccyx/Buttocks Redness and Moisture Fissure  (R) Leg Edema  (L) Leg Edema  (R) Heel/Foot/Toe Swelling  (L) Heel/Foot/Toe Bruising and Swelling          Devices In Places NA      Interventions In Place Barrier Cream    Possible Skin Injury Yes    Pictures Uploaded Into Epic Yes  Wound Consult Placed N/A  RN Wound Prevention Protocol Ordered Yes

## 2024-01-02 PROBLEM — D64.9 ANEMIA: Status: ACTIVE | Noted: 2024-01-02

## 2024-01-02 LAB — HGB BLD-MCNC: 9.1 G/DL (ref 12–16)

## 2024-01-02 PROCEDURE — 85018 HEMOGLOBIN: CPT

## 2024-01-02 PROCEDURE — 96366 THER/PROPH/DIAG IV INF ADDON: CPT

## 2024-01-02 PROCEDURE — 700111 HCHG RX REV CODE 636 W/ 250 OVERRIDE (IP): Mod: JZ | Performed by: HOSPITALIST

## 2024-01-02 PROCEDURE — 700105 HCHG RX REV CODE 258: Performed by: HOSPITALIST

## 2024-01-02 PROCEDURE — 700102 HCHG RX REV CODE 250 W/ 637 OVERRIDE(OP)

## 2024-01-02 PROCEDURE — A9270 NON-COVERED ITEM OR SERVICE: HCPCS | Performed by: STUDENT IN AN ORGANIZED HEALTH CARE EDUCATION/TRAINING PROGRAM

## 2024-01-02 PROCEDURE — A9270 NON-COVERED ITEM OR SERVICE: HCPCS

## 2024-01-02 PROCEDURE — 770006 HCHG ROOM/CARE - MED/SURG/GYN SEMI*

## 2024-01-02 PROCEDURE — 36415 COLL VENOUS BLD VENIPUNCTURE: CPT

## 2024-01-02 PROCEDURE — 99232 SBSQ HOSP IP/OBS MODERATE 35: CPT | Performed by: STUDENT IN AN ORGANIZED HEALTH CARE EDUCATION/TRAINING PROGRAM

## 2024-01-02 PROCEDURE — 700102 HCHG RX REV CODE 250 W/ 637 OVERRIDE(OP): Performed by: STUDENT IN AN ORGANIZED HEALTH CARE EDUCATION/TRAINING PROGRAM

## 2024-01-02 RX ORDER — HYDROCORTISONE ACETATE 25 MG/1
25 SUPPOSITORY RECTAL EVERY 12 HOURS
Status: DISCONTINUED | OUTPATIENT
Start: 2024-01-02 | End: 2024-01-03

## 2024-01-02 RX ADMIN — LOPERAMIDE HYDROCHLORIDE 4 MG: 2 CAPSULE ORAL at 12:53

## 2024-01-02 RX ADMIN — HYDROCORTISONE ACETATE 25 MG: 25 SUPPOSITORY RECTAL at 17:44

## 2024-01-02 RX ADMIN — LATANOPROST 1 DROP: 50 SOLUTION OPHTHALMIC at 20:10

## 2024-01-02 RX ADMIN — LOSARTAN POTASSIUM 25 MG: 25 TABLET, FILM COATED ORAL at 05:26

## 2024-01-02 RX ADMIN — FERROUS SULFATE TAB 325 MG (65 MG ELEMENTAL FE) 325 MG: 325 (65 FE) TAB at 08:04

## 2024-01-02 RX ADMIN — AMLODIPINE BESYLATE 2.5 MG: 5 TABLET ORAL at 05:27

## 2024-01-02 RX ADMIN — SODIUM CHLORIDE 250 MG: 9 INJECTION, SOLUTION INTRAVENOUS at 05:28

## 2024-01-02 RX ADMIN — SODIUM CHLORIDE 250 MG: 9 INJECTION, SOLUTION INTRAVENOUS at 17:43

## 2024-01-02 RX ADMIN — OMEPRAZOLE 20 MG: 20 CAPSULE, DELAYED RELEASE ORAL at 05:28

## 2024-01-02 ASSESSMENT — ENCOUNTER SYMPTOMS
WEIGHT LOSS: 1
WEAKNESS: 1
DIARRHEA: 1

## 2024-01-02 ASSESSMENT — PAIN DESCRIPTION - PAIN TYPE
TYPE: ACUTE PAIN
TYPE: ACUTE PAIN

## 2024-01-02 NOTE — DISCHARGE PLANNING
HTH/SCP TCN chart review completed. Collaborated with SAMMIE You prior to meeting with the pt. The most current review of medical record, knowledge of pt's PLOF and social support, LACE+ score of 56, 6 clicks scores of 19 ADL's and 9 mobility were considered.      Pt seen at bedside. Introduced TCN program. Provided education regarding post acute levels of care. Discussed HTH/SCP plan benefits. Pt verbalizes understanding.     Patient states she lives in a one story home with her daughter and son-in-law and was modified independent with ADL's and some IADL's, and mobility (with SPC or 4WW).  Her daughter provides transportation as needed and has no concerns with food, housing or transportation.      She states she is not at her baseline level of function.  Per chart review, PT/OT is recommending post-acute placement.  Patient is agreeable to post-acute placement.      Choice proactively obtained for SNF ([facility]. [facility], 2. [facility], 3. [facility]), faxed to TIFFANY and given to SAMMIE.  TCN will continue to follow and collaborate with discharge planning team as additional post acute needs arise. Thank you.     Completed today:  PT/OT recommend post-acute placement on 12/31/23.    Choice obtained:  SNF ([facility]. [facility], [facility]. [facility], 3. [facility]) on 1/1/24.    SCP with [facility] PCP.   Patient will likely need post-acute placement.

## 2024-01-02 NOTE — CARE PLAN
The patient is Stable - Low risk of patient condition declining or worsening    Shift Goals  Clinical Goals: Patient to be safe throughout this shift  Patient Goals: Rest  Family Goals: KATIE    Progress made toward(s) clinical / shift goals:      Problem: Knowledge Deficit - Standard  Goal: Patient and family/care givers will demonstrate understanding of plan of care, disease process/condition, diagnostic tests and medications  Outcome: Progressing       Patient is not progressing towards the following goals:

## 2024-01-02 NOTE — PROGRESS NOTES
Hospital Medicine Daily Progress Note    Date of Service  1/2/2024    Chief Complaint  Beth SOW is a 83 y.o. female admitted 12/30/2023 with weakness.    Hospital Course  Ms. Sow is a 83 y.o. female with PMHx chronic venous insufficiency with chronic lower extremity edema, chronic diarrhea, hemorrhoids, iron deficiency anemia, who presented 12/30/2023 with progressively worsening fatigue, bleeding hemorrhoids, diarrhea with incontinence, inability to care for self. She has had chronic diarrhea for over a year, lately has had incontinence as well and has not been able to make it to the bathroom before soiling herself. She has had progressive fatigue over several months, worse over the last few weeks. She has had persistent bright red bleeding and pain from hemorrhoids, which she states she was told she cannot get treated until her diarrhea resolves. She lives with her daughter and son-in-law, they were concerned about her worsening condition, inability to care for herself, and her incontinence. She has intermittent crampy abdominal pain which seems to be associated with food intake. She has tried taking loperamide for her diarrhea without benefit. Her PCP ordered C.diff studies which were negative on 12/20/2023. She did have colonoscopy earlier this year 1/25/2023 to assess etiology of her chronic diarrhea, did not show any significant findings.      Interval Problem Update  No acute events overnight.  Hgb improved this morning to 9.1 after RBC transfusion yesterday.  Diarrhea normal appearing, although patient does report intermittent bright red blood with her stools sometimes from her hemorrhoids.  Will start hydrocortisone suppository BID for hemorrhoid inflammation.  Continue imodium and lomotil prn for ongoing diarrhea.  Check H/H level in the morning, if stable, patient will be medically cleared to discharge to SNF.      I have discussed this patient's plan of care and discharge plan at IDT  rounds today with Case Management, Nursing, Nursing leadership, and other members of the IDT team.    Consultants/Specialty  none    Code Status  DNAR/DNI    Disposition  The patient is not medically cleared for discharge to home or a post-acute facility.  Anticipate discharge to: skilled nursing facility    I have placed the appropriate orders for post-discharge needs.    Review of Systems  Review of Systems   Constitutional:  Positive for malaise/fatigue and weight loss.   Gastrointestinal:  Positive for diarrhea.   Neurological:  Positive for weakness.   All other systems reviewed and are negative.       Physical Exam  Temp:  [36.1 °C (97 °F)-37.4 °C (99.4 °F)] 36.9 °C (98.5 °F)  Pulse:  [64-87] 74  Resp:  [16-18] 18  BP: ()/(60-79) 100/66  SpO2:  [92 %-98 %] 96 %    Physical Exam  Vitals and nursing note reviewed.   HENT:      Head:      Comments: Temporal wasting  Cardiovascular:      Rate and Rhythm: Normal rate and regular rhythm.   Pulmonary:      Effort: Pulmonary effort is normal.      Breath sounds: Normal breath sounds.   Abdominal:      General: There is distension.      Tenderness: There is no abdominal tenderness.   Genitourinary:     Comments: +external hemorrhoids, perineal sensation intact, rectal tone intact  Musculoskeletal:      Right lower leg: Edema present.      Left lower leg: Edema present.   Neurological:      General: No focal deficit present.      Mental Status: She is oriented to person, place, and time.   Psychiatric:         Behavior: Behavior normal.         Fluids    Intake/Output Summary (Last 24 hours) at 1/2/2024 1558  Last data filed at 1/2/2024 1400  Gross per 24 hour   Intake 623.33 ml   Output 800 ml   Net -176.67 ml       Laboratory  Recent Labs     12/30/23  1922 12/31/23  1008 01/01/24  0158 01/02/24  0440   WBC 7.9 6.3 5.3  --    RBC 3.95* 3.77* 3.36*  --    HEMOGLOBIN 8.2* 7.9* 6.9* 9.1*   HEMATOCRIT 24.8* 23.4* 21.3*  --    MCV 62.8* 62.1* 63.4*  --    MCH 20.8*  21.0* 20.5*  --    MCHC 33.1 33.8 32.4  --    RDW 41.9 41.7 42.1  --    PLATELETCT 227 204 173  --    MPV 8.8* 8.7* 9.0  --      Recent Labs     12/30/23  1922 12/31/23  1008 01/01/24  0158   SODIUM 129* 130* 131*   POTASSIUM 4.4 3.8 3.7   CHLORIDE 101 102 103   CO2 17* 19* 20   GLUCOSE 138* 80 111*   BUN 36* 30* 28*   CREATININE 0.93 0.66 0.61   CALCIUM 7.9* 7.4* 7.1*     Recent Labs     12/30/23 1922   APTT 31.8   INR 1.50*               Imaging  CT-ABDOMEN-PELVIS WITH   Final Result      1.  No evidence of bowel obstruction.      2.  Diffuse thickening of the wall of the rectum and sigmoid colon with diverticulosis present. There is no surrounding inflammatory stranding. Some may represent mild proctosigmoid colitis.      3.  Moderate constipation.      4.  Bibasilar atelectasis with minimal left pleural effusion.      5.  Fatty liver.      6.  Again seen cystic mass involving the uncinate process of the pancreas. Which is unchanged.      7.  Extensive atherosclerotic change of the aorta.      DX-CHEST-PORTABLE (1 VIEW)   Final Result      No acute cardiopulmonary disease.           Assessment/Plan  * Functional diarrhea- (present on admission)  Assessment & Plan  Ongoing for over a year now. She reports hourly diarrhea. C. Diff studies negative on 12/20/2023. She did have mild proctocolitis noted on CT of the abdomen.   Extensive workup with PCP and [facility] Dr. Pavon. Has had stool studies, cdiff test, and colonoscopy with biopsy. Did have an elevated stool lactoferrin but other studies normal.   -Lower suspicion this is an acute infectious process given lengthy time course  -Will attempt symptomatic management with loperamide as well as lomotil.   Start hydrocortisone suppository for hemorrhoidal inflammation    Hemorrhoids  Assessment & Plan  Numerous bleeding external hemorrhoids noted on presentation.   Followed by [facility], last seen 12/18/23. Clinic note considers  flex sig and discussion of banding at next visit    Debility- (present on admission)  Assessment & Plan  Patient with progressive functional decline, poor PO intake, likely in the setting of progressive anemia  PT/OT evaluated, recommended post-acute placement  - SNF referral placed, CM consulted    Iron deficiency anemia- (present on admission)  Assessment & Plan  Hb is down to 6.9 MCV 63 with serum iron low at 18 and low %sat of 13. She has bleeding external hemorrhoids which could be accounting for this. She did have colonoscopy within the last year in January due to chronic diarrhea which was unrevealing  IV iron ordered  -Iron supplementation with ferrous sulfate 325mg which may help with diarrhea  Hgb improved after RBC transfusion 1/1/24  monitor    Colitis- (present on admission)  Assessment & Plan  Mild proctocolitis noted on CT abdomen however her symptoms have been ongoing for a year. Did get IV ceftriaxone/metronidazole in the ED which will be stopped.  -C.diff stool studies negative  -Can consider repeat stool studies though review of PCP/GI notes suggests that she previously had stool work-up which was unremarkable apart from elevated lactoferrin, and her diarrhea is not significantly changed recently    Chronic venous insufficiency- (present on admission)  Assessment & Plan  Review of vascular clinic notes, she has had this for over a year, previously stopped her calcium channel blocker as it was thought to be contributing but this did not lead to improvement in her leg edema.  -She had echocardiogram performed 10/2022 which showed normal EF, this was after her leg swelling started per review of previous vascular clinic notes  -2g sodium restriction    Hypertension- (present on admission)  Assessment & Plan  Continue amlodipine and losartan  -She does have lower extremity edema, amlodipine was previously thought to be contributing but it did not improve after discontinuation trial         VTE  prophylaxis:   SCDs/TEDs

## 2024-01-02 NOTE — CARE PLAN
The patient is Stable - Low risk of patient condition declining or worsening    Shift Goals  Clinical Goals: Patient to rest comfortably  Patient Goals: rest  Family Goals: KATIE    Progress made toward(s) clinical / shift goals:  Patient was able to use call light to alert medical staff when she needed assistance after a BM or urinary incontinence. Patient demonstrated an understanding of medication and their purpose.       Problem: Knowledge Deficit - Standard  Goal: Patient and family/care givers will demonstrate understanding of plan of care, disease process/condition, diagnostic tests and medications  Outcome: Progressing  Note: Patient will demonstrate an understanding of medication and its purpose by the end of shift.     Problem: Skin Integrity  Goal: Skin integrity is maintained or improved  Outcome: Progressing  Note: Patient will utilize call light after episodes of incontinence to prevent skin breakdown by end of shift.

## 2024-01-03 LAB
ERYTHROCYTE [DISTWIDTH] IN BLOOD BY AUTOMATED COUNT: 56.2 FL (ref 35.9–50)
HCT VFR BLD AUTO: 26.3 % (ref 37–47)
HGB BLD-MCNC: 8.9 G/DL (ref 12–16)
MCH RBC QN AUTO: 22.5 PG (ref 27–33)
MCHC RBC AUTO-ENTMCNC: 33.8 G/DL (ref 32.2–35.5)
MCV RBC AUTO: 66.4 FL (ref 81.4–97.8)
PLATELET # BLD AUTO: 143 K/UL (ref 164–446)
PMV BLD AUTO: 9.2 FL (ref 9–12.9)
RBC # BLD AUTO: 3.96 M/UL (ref 4.2–5.4)
WBC # BLD AUTO: 6 K/UL (ref 4.8–10.8)

## 2024-01-03 PROCEDURE — 770006 HCHG ROOM/CARE - MED/SURG/GYN SEMI*

## 2024-01-03 PROCEDURE — 51798 US URINE CAPACITY MEASURE: CPT

## 2024-01-03 PROCEDURE — 85027 COMPLETE CBC AUTOMATED: CPT

## 2024-01-03 PROCEDURE — 36415 COLL VENOUS BLD VENIPUNCTURE: CPT

## 2024-01-03 PROCEDURE — A9270 NON-COVERED ITEM OR SERVICE: HCPCS

## 2024-01-03 PROCEDURE — 700105 HCHG RX REV CODE 258: Performed by: HOSPITALIST

## 2024-01-03 PROCEDURE — 700102 HCHG RX REV CODE 250 W/ 637 OVERRIDE(OP)

## 2024-01-03 PROCEDURE — 99232 SBSQ HOSP IP/OBS MODERATE 35: CPT | Performed by: STUDENT IN AN ORGANIZED HEALTH CARE EDUCATION/TRAINING PROGRAM

## 2024-01-03 PROCEDURE — 700111 HCHG RX REV CODE 636 W/ 250 OVERRIDE (IP): Mod: JZ | Performed by: HOSPITALIST

## 2024-01-03 RX ADMIN — SODIUM CHLORIDE 250 MG: 9 INJECTION, SOLUTION INTRAVENOUS at 05:41

## 2024-01-03 RX ADMIN — LATANOPROST 1 DROP: 50 SOLUTION OPHTHALMIC at 20:20

## 2024-01-03 RX ADMIN — FERROUS SULFATE TAB 325 MG (65 MG ELEMENTAL FE) 325 MG: 325 (65 FE) TAB at 08:07

## 2024-01-03 RX ADMIN — OMEPRAZOLE 20 MG: 20 CAPSULE, DELAYED RELEASE ORAL at 05:36

## 2024-01-03 RX ADMIN — LOSARTAN POTASSIUM 25 MG: 25 TABLET, FILM COATED ORAL at 05:36

## 2024-01-03 RX ADMIN — AMLODIPINE BESYLATE 2.5 MG: 5 TABLET ORAL at 05:36

## 2024-01-03 ASSESSMENT — PAIN DESCRIPTION - PAIN TYPE
TYPE: ACUTE PAIN
TYPE: ACUTE PAIN

## 2024-01-03 ASSESSMENT — ENCOUNTER SYMPTOMS
DIARRHEA: 1
WEIGHT LOSS: 1
WEAKNESS: 1

## 2024-01-03 NOTE — DISCHARGE PLANNING
Case Management Discharge Planning    Admission Date: 12/30/2023  GMLOS: 2.6  ALOS: 1    6-Clicks ADL Score: 19  6-Clicks Mobility Score: 9  PT and/or OT Eval ordered: Yes  Post-acute Referrals Ordered: Yes  Post-acute Choice Obtained: Yes  Has referral(s) been sent to post-acute provider:  Yes      Anticipated Discharge Dispo: Discharge Disposition: D/T to SNF with Medicare cert in anticipation of skilled care (03)    DME Needed: No    Action(s) Taken: Updated Provider/Nurse on Discharge Plan    Escalations Completed: Pending Discharge Destination    Medically Clear: No    Next Steps: see below    Barriers to Discharge: Medical clearance and Pending Placement    Is the patient up for discharge tomorrow: No      CM met with pt at bedside to complete the assessment.  Pt lives with daughter and son in law.      Care Transition Team Assessment    Information Source  Orientation Level: Oriented X4  Information Given By: Patient  Who is responsible for making decisions for patient? : Patient    Readmission Evaluation  Is this a readmission?: No    Elopement Risk  Legal Hold: No  Ambulatory or Self Mobile in Wheelchair: Yes  Disoriented: No  Psychiatric Symptoms: None  History of Wandering: No  Elopement this Admit: No  Vocalizing Wanting to Leave: No  Displays Behaviors, Body Language Wanting to Leave: No-Not at Risk for Elopement  Elopement Risk: Not at Risk for Elopement    Interdisciplinary Discharge Planning  Primary Care Physician: Pancho Cole III  Lives with - Patient's Self Care Capacity: Adult Children  Patient or legal guardian wants to designate a caregiver: No  Support Systems: Children  Housing / Facility: [address]  Do You Take your Prescribed Medications Regularly: Yes ([facility])  Able to Return to Previous ADL's: Yes  Mobility Issues: Yes  Prior Services: None  Patient Prefers to be Discharged to:: Home  Assistance Needed: Yes  Durable Medical Equipment: Walker    Discharge Preparedness  What is your  plan after discharge?: Uncertain - pending medical team collaboration  What are your discharge supports?: Child  Prior Functional Level: Ambulatory, Independent with Activities of Daily Living, Independent with Medication Management  Difficulity with ADLs: Walking    Functional Assesment  Prior Functional Level: Ambulatory, Independent with Activities of Daily Living, Independent with Medication Management    Finances  Financial Barriers to Discharge: No  Prescription Coverage: Yes    Vision / Hearing Impairment  Vision Impairment : Yes  Right Eye Vision: Impaired  Left Eye Vision: Impaired  Hearing Impairment : Yes  Hearing Impairment: Hearing Device Not Available  Does Pt Need Special Equipment for the Hearing Impaired?: No         Advance Directive  Advance Directive?: None  Advance Directive offered?: AD Booklet refused    Domestic Abuse  Have you ever been the victim of abuse or violence?: No  Physical Abuse or Sexual Abuse: No  Verbal Abuse or Emotional Abuse: No  Possible Abuse/Neglect Reported to:: Not Applicable    Psychological Assessment  History of Substance Abuse: None  History of Psychiatric Problems: No  Non-compliant with Treatment: No  Newly Diagnosed Illness: No    Discharge Risks or Barriers  Discharge risks or barriers?: Post-acute placement / services    Anticipated Discharge Information  Discharge Disposition: D/T to SNF with Medicare cert in anticipation of skilled care (03)

## 2024-01-03 NOTE — DISCHARGE PLANNING
HTH/SCP TCN chart review completed..  Current discharge considerations are post acute placement. Noted PT/OT recommending post acute placement, and [facility] has accepted. TCN will continue to follow and collaborate with discharge planning team as additional post acute needs arise. Thank you.    Completed:  PT/OT recommend post-acute placement on 12/31/23.    Choice obtained:  SNF   [facility]. [facility] (pending)  2. [facility] (accepted)  3. [facility]   SCP with [facility] PCP.   Patient will likely need post-acute placement.

## 2024-01-03 NOTE — CARE PLAN
The patient is Stable - Low risk of patient condition declining or worsening    Shift Goals  Clinical Goals: patient will have less frequent diarrhea and keep skin clean and dry  Patient Goals: rest comfortably  Family Goals: ashleigh    Progress made toward(s) clinical / shift goals:  Patient and RN discussed plan of care and patient demonstrated understanding of plan of care.   Problem: Knowledge Deficit - Standard  Goal: Patient and family/care givers will demonstrate understanding of plan of care, disease process/condition, diagnostic tests and medications  Outcome: Progressing  Note: Patient will demonstrate understanding of plan of care by end of shift.     Problem: Urinary Elimination  Goal: Establish and maintain regular urinary output  Outcome: Progressing  Note: Patient will void without catheter by 1645 of this shift on 1/3/24.

## 2024-01-03 NOTE — PROGRESS NOTES
Discussed with MD Liz about patient's bond inserted due to retention. Per MD Liz, attempt voiding trial.     Bond removed. Educated patient on bond removal protocol. Patient verbalized understanding of teaching.

## 2024-01-03 NOTE — CARE PLAN
The patient is Stable - Low risk of patient condition declining or worsening    Shift Goals  Clinical Goals: ensure safety and check H/H in AM  Patient Goals: rest  Family Goals: ashleigh    Progress made toward(s) clinical / shift goals: Received patient in bed alert and oriented x4. Denies pain at this time. Updated on POC and verbalized understanding. The bond catheter is in place and has been drained. Hourly rounds performed. Fall precautions in place and call light within reach. All other needs met.      Patient is not progressing towards the following goals:    Problem: Knowledge Deficit - Standard  Goal: Patient and family/care givers will demonstrate understanding of plan of care, disease process/condition, diagnostic tests and medications  Description: Target End Date:  1-3 days or as soon as patient condition allows    Document in Patient Education    1.  Patient and family/caregiver oriented to unit, equipment, visitation policy and means for communicating concern  2.  Complete/review Learning Assessment  3.  Assess knowledge level of disease process/condition, treatment plan, diagnostic tests and medications  4.  Explain disease process/condition, treatment plan, diagnostic tests and medications  Outcome: Not Progressing

## 2024-01-04 VITALS
BODY MASS INDEX: 19.03 KG/M2 | HEART RATE: 80 BPM | DIASTOLIC BLOOD PRESSURE: 57 MMHG | RESPIRATION RATE: 18 BRPM | OXYGEN SATURATION: 96 % | HEIGHT: 65 IN | TEMPERATURE: 98 F | WEIGHT: 114.2 LBS | SYSTOLIC BLOOD PRESSURE: 97 MMHG

## 2024-01-04 PROCEDURE — A9270 NON-COVERED ITEM OR SERVICE: HCPCS

## 2024-01-04 PROCEDURE — 51798 US URINE CAPACITY MEASURE: CPT

## 2024-01-04 PROCEDURE — 700102 HCHG RX REV CODE 250 W/ 637 OVERRIDE(OP)

## 2024-01-04 PROCEDURE — 99239 HOSP IP/OBS DSCHRG MGMT >30: CPT | Performed by: STUDENT IN AN ORGANIZED HEALTH CARE EDUCATION/TRAINING PROGRAM

## 2024-01-04 RX ORDER — FERROUS SULFATE 325(65) MG
325 TABLET ORAL
Qty: 30 TABLET | Refills: 0
Start: 2024-01-05

## 2024-01-04 RX ORDER — BENZOCAINE/MENTHOL 6 MG-10 MG
LOZENGE MUCOUS MEMBRANE 2 TIMES DAILY
Status: DISCONTINUED | OUTPATIENT
Start: 2024-01-04 | End: 2024-01-04 | Stop reason: HOSPADM

## 2024-01-04 RX ORDER — LOPERAMIDE HYDROCHLORIDE 2 MG/1
4 CAPSULE ORAL 4 TIMES DAILY PRN
Qty: 30 CAPSULE | Refills: 0
Start: 2024-01-04

## 2024-01-04 RX ORDER — BENZOCAINE/MENTHOL 6 MG-10 MG
1 LOZENGE MUCOUS MEMBRANE 2 TIMES DAILY
Qty: 1 EACH | Refills: 0
Start: 2024-01-04

## 2024-01-04 RX ORDER — DIPHENOXYLATE HYDROCHLORIDE AND ATROPINE SULFATE 2.5; .025 MG/1; MG/1
1 TABLET ORAL 4 TIMES DAILY PRN
Qty: 30 TABLET | Refills: 0
Start: 2024-01-04 | End: 2024-01-11

## 2024-01-04 RX ADMIN — FERROUS SULFATE TAB 325 MG (65 MG ELEMENTAL FE) 325 MG: 325 (65 FE) TAB at 08:14

## 2024-01-04 RX ADMIN — AMLODIPINE BESYLATE 2.5 MG: 5 TABLET ORAL at 05:00

## 2024-01-04 RX ADMIN — LOSARTAN POTASSIUM 25 MG: 25 TABLET, FILM COATED ORAL at 05:00

## 2024-01-04 RX ADMIN — OMEPRAZOLE 20 MG: 20 CAPSULE, DELAYED RELEASE ORAL at 05:00

## 2024-01-04 ASSESSMENT — PATIENT HEALTH QUESTIONNAIRE - PHQ9
SUM OF ALL RESPONSES TO PHQ9 QUESTIONS 1 AND 2: 0
2. FEELING DOWN, DEPRESSED, IRRITABLE, OR HOPELESS: NOT AT ALL
1. LITTLE INTEREST OR PLEASURE IN DOING THINGS: NOT AT ALL

## 2024-01-04 ASSESSMENT — PAIN DESCRIPTION - PAIN TYPE: TYPE: ACUTE PAIN

## 2024-01-04 ASSESSMENT — ENCOUNTER SYMPTOMS
WEIGHT LOSS: 1
DIARRHEA: 1
WEAKNESS: 1

## 2024-01-04 NOTE — DISCHARGE PLANNING
Case Management Discharge Planning    Admission Date: 12/30/2023  GMLOS: 2.6  ALOS: 2    6-Clicks ADL Score: 19  6-Clicks Mobility Score: 9  PT and/or OT Eval ordered: Yes  Post-acute Referrals Ordered: Yes  Post-acute Choice Obtained: Yes  Has referral(s) been sent to post-acute provider:  Yes      Anticipated Discharge Dispo: Discharge Disposition: D/T to SNF with Medicare cert in anticipation of skilled care (03)    DME Needed: No    Action(s) Taken: Updated Provider/Nurse on Discharge Plan    Escalations Completed: None    Medically Clear: Yes    Next Steps: see below    Barriers to Discharge: Pending Placement    Is the patient up for discharge today: Yes    Is transport arranged for discharge disposition: Yes      Pt agreeable to go to [facility] and spoke with family Annemarie Olivier about transfer.  [facility] was not sure they can accept when called.    Dr Liz put in the discharge order.

## 2024-01-04 NOTE — CARE PLAN
The patient is Stable - Low risk of patient condition declining or worsening    Shift Goals  Clinical Goals: prevention and care for incontinence associated dematitis  Patient Goals: rest  Family Goals: ashleigh    Progress made toward(s) clinical / shift goals:  Received patient in bed alert and oriented x4. Denies pain at this time. Updated on POC and verbalized understanding. patient reports no feeling of urgency or discomfort related to urination. At 12mn Bladder scan with 146 ml. Hourly rounds performed. Fall precautions in place and call light within reach. All other needs met.     Patient is not progressing towards the following goals:    Problem: Knowledge Deficit - Standard  Goal: Patient and family/care givers will demonstrate understanding of plan of care, disease process/condition, diagnostic tests and medications  Description: Target End Date:  1-3 days or as soon as patient condition allows    Document in Patient Education    1.  Patient and family/caregiver oriented to unit, equipment, visitation policy and means for communicating concern  2.  Complete/review Learning Assessment  3.  Assess knowledge level of disease process/condition, treatment plan, diagnostic tests and medications  4.  Explain disease process/condition, treatment plan, diagnostic tests and medications  Outcome: Not Progressing

## 2024-01-04 NOTE — PROGRESS NOTES
Noted loose stools in BM. Changed and cleaned pt, applied barrier cream. Q2 turns. Heel mepilex in use.

## 2024-01-04 NOTE — DISCHARGE PLANNING
HTH/SCP TCN chart review completed. Current discharge considerations are [facility], patient's second choice.  Noted PT/OT recommending placement, and patient's first choice, [facility], is still pending..  TCN will continue to follow and collaborate with discharge planning team as additional post acute needs arise. Thank you.    Completed:  PT/OT recommend post-acute placement on 12/31/23.    Choice obtained:  SNF   1. [facility] (pending)  2. [facility] (accepted)  3. [facility]   SCP with [facility] PCP.   Patient will likely need post-acute placement.

## 2024-01-04 NOTE — PROGRESS NOTES
[facility] Medicine Daily Progress Note    Date of Service  1/4/2024    Chief Complaint  Beth SOW is a 83 y.o. female admitted 12/30/2023 with weakness.    Hospital Course  Ms. Sow is a 83 y.o. female with PMHx chronic venous insufficiency with chronic lower extremity edema, chronic diarrhea, hemorrhoids, iron deficiency anemia, who presented 12/30/2023 with progressively worsening fatigue, bleeding hemorrhoids, diarrhea with incontinence, inability to care for self. She has had chronic diarrhea for over a year, lately has had incontinence as well and has not been able to make it to the bathroom before soiling herself. She has had progressive fatigue over several months, worse over the last few weeks. She has had persistent bright red bleeding and pain from hemorrhoids, which she states she was told she cannot get treated until her diarrhea resolves. She lives with her daughter and son-in-law, they were concerned about her worsening condition, inability to care for herself, and her incontinence. She has intermittent crampy abdominal pain which seems to be associated with food intake. She has tried taking loperamide for her diarrhea without benefit. Her PCP ordered C.diff studies which were negative on 12/20/2023. She did have colonoscopy earlier this year 1/25/2023 to assess etiology of her chronic diarrhea, did not show any significant findings.      Interval Problem Update  No acute events overnight.  Patient with some rectal discomfort from diarrhea and hemorrhoids.  Hydrocortisone cream for rectum ordered.  Discussed possible surgery evaluation for hemorrhoids vs diverting colostomy; she would like to talk to her family and think about it, but is leaning away from pursuing surgery at this time.  Continue supportive treatment for diarrhea.  Patient is medically cleared to discharge to SNF.      I have discussed this patient's plan of care and discharge plan at IDT rounds today with Case  Management, Nursing, Nursing leadership, and other members of the IDT team.    Consultants/Specialty  none    Code Status  DNAR/DNI    Disposition  The patient is medically cleared for discharge to home or a post-acute facility.  Anticipate discharge to: skilled nursing facility    I have placed the appropriate orders for post-discharge needs.    Review of Systems  Review of Systems   Constitutional:  Positive for malaise/fatigue and weight loss.   Gastrointestinal:  Positive for diarrhea.   Neurological:  Positive for weakness.   All other systems reviewed and are negative.       Physical Exam  Temp:  [36.2 °C (97.2 °F)-37.3 °C (99.1 °F)] 36.6 °C (97.8 °F)  Pulse:  [65-90] 90  Resp:  [16-18] 18  BP: (103-130)/(64-81) 130/81  SpO2:  [94 %-99 %] 94 %    Physical Exam  Vitals and nursing note reviewed.   HENT:      Head:      Comments: Temporal wasting  Cardiovascular:      Rate and Rhythm: Normal rate and regular rhythm.   Pulmonary:      Effort: Pulmonary effort is normal.      Breath sounds: Normal breath sounds.   Abdominal:      General: There is distension.      Tenderness: There is no abdominal tenderness.   Genitourinary:     Comments: +external hemorrhoids, perineal sensation intact, rectal tone intact  Musculoskeletal:      Right lower leg: Edema present.      Left lower leg: Edema present.   Neurological:      General: No focal deficit present.      Mental Status: She is oriented to person, place, and time.   Psychiatric:         Behavior: Behavior normal.         Fluids  No intake or output data in the 24 hours ending 01/04/24 1254      Laboratory  Recent Labs     01/02/24  0440 01/03/24  0301   WBC  --  6.0   RBC  --  3.96*   HEMOGLOBIN 9.1* 8.9*   HEMATOCRIT  --  26.3*   MCV  --  66.4*   MCH  --  22.5*   MCHC  --  33.8   RDW  --  56.2*   PLATELETCT  --  143*   MPV  --  9.2                           Imaging  CT-ABDOMEN-PELVIS WITH   Final Result      1.  No evidence of bowel obstruction.      2.  Diffuse  thickening of the wall of the rectum and sigmoid colon with diverticulosis present. There is no surrounding inflammatory stranding. Some may represent mild proctosigmoid colitis.      3.  Moderate constipation.      4.  Bibasilar atelectasis with minimal left pleural effusion.      5.  Fatty liver.      6.  Again seen cystic mass involving the uncinate process of the pancreas. Which is unchanged.      7.  Extensive atherosclerotic change of the aorta.      DX-CHEST-PORTABLE (1 VIEW)   Final Result      No acute cardiopulmonary disease.           Assessment/Plan  * Functional diarrhea- (present on admission)  Assessment & Plan  Ongoing for over a year now. She reports hourly diarrhea. C. Diff studies negative on 12/20/2023. She did have mild proctocolitis noted on CT of the abdomen.   Extensive workup with PCP and [facility] Dr. Pavon. Has had stool studies, cdiff test, and colonoscopy with biopsy. Did have an elevated stool lactoferrin but other studies normal.   -Lower suspicion this is an acute infectious process given lengthy time course  -Will attempt symptomatic management with loperamide as well as lomotil.   No improvement with hydrocortisone suppository, patient requests to discontinue it    Hemorrhoids  Assessment & Plan  Numerous bleeding external hemorrhoids noted on presentation.   Followed by [facility], last seen 12/18/23. Clinic note considers flex sig and discussion of banding at next visit  Hydrocortisone cream to rectum    Debility- (present on admission)  Assessment & Plan  Patient with progressive functional decline, poor PO intake, likely in the setting of progressive anemia  PT/OT evaluated, recommended post-acute placement  - SNF referral placed, CM consulted    Iron deficiency anemia- (present on admission)  Assessment & Plan  Hb is down to 6.9 MCV 63 with serum iron low at 18 and low %sat of 13. She has bleeding external hemorrhoids which could be  accounting for this. She did have colonoscopy within the last year in January due to chronic diarrhea which was unrevealing  IV iron ordered  -Iron supplementation with ferrous sulfate 325mg which may help with diarrhea  Hgb improved after RBC transfusion 1/1/24  monitor    Colitis- (present on admission)  Assessment & Plan  Mild proctocolitis noted on CT abdomen however her symptoms have been ongoing for a year. Did get IV ceftriaxone/metronidazole in the ED which will be stopped.  -C.diff stool studies negative  -Can consider repeat stool studies though review of PCP/GI notes suggests that she previously had stool work-up which was unremarkable apart from elevated lactoferrin, and her diarrhea is not significantly changed recently    Chronic venous insufficiency- (present on admission)  Assessment & Plan  Review of vascular clinic notes, she has had this for over a year, previously stopped her calcium channel blocker as it was thought to be contributing but this did not lead to improvement in her leg edema.  -She had echocardiogram performed 10/2022 which showed normal EF, this was after her leg swelling started per review of previous vascular clinic notes  -2g sodium restriction    Hypertension- (present on admission)  Assessment & Plan  Continue amlodipine and losartan  -She does have lower extremity edema, amlodipine was previously thought to be contributing but it did not improve after discontinuation trial         VTE prophylaxis: SCDs

## 2024-01-04 NOTE — PROGRESS NOTES
Hospital Medicine Daily Progress Note    Date of Service  1/3/2024    Chief Complaint  Beth SOW is a 83 y.o. female admitted 12/30/2023 with weakness.    Hospital Course  Ms. Sow is a 83 y.o. female with PMHx chronic venous insufficiency with chronic lower extremity edema, chronic diarrhea, hemorrhoids, iron deficiency anemia, who presented 12/30/2023 with progressively worsening fatigue, bleeding hemorrhoids, diarrhea with incontinence, inability to care for self. She has had chronic diarrhea for over a year, lately has had incontinence as well and has not been able to make it to the bathroom before soiling herself. She has had progressive fatigue over several months, worse over the last few weeks. She has had persistent bright red bleeding and pain from hemorrhoids, which she states she was told she cannot get treated until her diarrhea resolves. She lives with her daughter and son-in-law, they were concerned about her worsening condition, inability to care for herself, and her incontinence. She has intermittent crampy abdominal pain which seems to be associated with food intake. She has tried taking loperamide for her diarrhea without benefit. Her PCP ordered C.diff studies which were negative on 12/20/2023. She did have colonoscopy earlier this year 1/25/2023 to assess etiology of her chronic diarrhea, did not show any significant findings.      Interval Problem Update  No acute events overnight.  Hgb stable at 8.9, no signs of bleeding.  Diarrhea continues, patient denies improvement with hydrocortisone suppository, requests it to be stopped. Order discontinued.  Continue supportive treatment for diarrhea.  Voiding trial today, remove bond catheter.  Patient is medically cleared for discharge to SNF.      I have discussed this patient's plan of care and discharge plan at IDT rounds today with Case Management, Nursing, Nursing leadership, and other members of the IDT  team.    Consultants/Specialty  none    Code Status  DNAR/DNI    Disposition  The patient is medically cleared for discharge to home or a post-acute facility.  Anticipate discharge to: skilled nursing facility    I have placed the appropriate orders for post-discharge needs.    Review of Systems  Review of Systems   Constitutional:  Positive for malaise/fatigue and weight loss.   Gastrointestinal:  Positive for diarrhea.   Neurological:  Positive for weakness.   All other systems reviewed and are negative.       Physical Exam  Temp:  [36.3 °C (97.3 °F)-36.6 °C (97.9 °F)] 36.6 °C (97.8 °F)  Pulse:  [65-80] 71  Resp:  [18] 18  BP: (108-125)/(67-78) 108/67  SpO2:  [91 %-99 %] 99 %    Physical Exam  Vitals and nursing note reviewed.   HENT:      Head:      Comments: Temporal wasting  Cardiovascular:      Rate and Rhythm: Normal rate and regular rhythm.   Pulmonary:      Effort: Pulmonary effort is normal.      Breath sounds: Normal breath sounds.   Abdominal:      General: There is distension.      Tenderness: There is no abdominal tenderness.   Genitourinary:     Comments: +external hemorrhoids, perineal sensation intact, rectal tone intact  Musculoskeletal:      Right lower leg: Edema present.      Left lower leg: Edema present.   Neurological:      General: No focal deficit present.      Mental Status: She is oriented to person, place, and time.   Psychiatric:         Behavior: Behavior normal.         Fluids    Intake/Output Summary (Last 24 hours) at 1/3/2024 1718  Last data filed at 1/3/2024 1039  Gross per 24 hour   Intake 360 ml   Output 950 ml   Net -590 ml       Laboratory  Recent Labs     01/01/24  0158 01/02/24  0440 01/03/24  0301   WBC 5.3  --  6.0   RBC 3.36*  --  3.96*   HEMOGLOBIN 6.9* 9.1* 8.9*   HEMATOCRIT 21.3*  --  26.3*   MCV 63.4*  --  66.4*   MCH 20.5*  --  22.5*   MCHC 32.4  --  33.8   RDW 42.1  --  56.2*   PLATELETCT 173  --  143*   MPV 9.0  --  9.2     Recent Labs     01/01/24  0158   SODIUM  131*   POTASSIUM 3.7   CHLORIDE 103   CO2 20   GLUCOSE 111*   BUN 28*   CREATININE 0.61   CALCIUM 7.1*                     Imaging  CT-ABDOMEN-PELVIS WITH   Final Result      1.  No evidence of bowel obstruction.      2.  Diffuse thickening of the wall of the rectum and sigmoid colon with diverticulosis present. There is no surrounding inflammatory stranding. Some may represent mild proctosigmoid colitis.      3.  Moderate constipation.      4.  Bibasilar atelectasis with minimal left pleural effusion.      5.  Fatty liver.      6.  Again seen cystic mass involving the uncinate process of the pancreas. Which is unchanged.      7.  Extensive atherosclerotic change of the aorta.      DX-CHEST-PORTABLE (1 VIEW)   Final Result      No acute cardiopulmonary disease.           Assessment/Plan  * Functional diarrhea- (present on admission)  Assessment & Plan  Ongoing for over a year now. She reports hourly diarrhea. C. Diff studies negative on 12/20/2023. She did have mild proctocolitis noted on CT of the abdomen.   Extensive workup with PCP and [facility] Dr. Pavon. Has had stool studies, cdiff test, and colonoscopy with biopsy. Did have an elevated stool lactoferrin but other studies normal.   -Lower suspicion this is an acute infectious process given lengthy time course  -Will attempt symptomatic management with loperamide as well as lomotil.   No improvement with hydrocortisone suppository, patient requests to discontinue it    Hemorrhoids  Assessment & Plan  Numerous bleeding external hemorrhoids noted on presentation.   Followed by [facility], last seen 12/18/23. Clinic note considers flex sig and discussion of banding at next visit    Debility- (present on admission)  Assessment & Plan  Patient with progressive functional decline, poor PO intake, likely in the setting of progressive anemia  PT/OT evaluated, recommended post-acute placement  - SNF referral placed, [facility]  consulted    Iron deficiency anemia- (present on admission)  Assessment & Plan  Hb is down to 6.9 MCV 63 with serum iron low at 18 and low %sat of 13. She has bleeding external hemorrhoids which could be accounting for this. She did have colonoscopy within the last year in January due to chronic diarrhea which was unrevealing  IV iron ordered  -Iron supplementation with ferrous sulfate 325mg which may help with diarrhea  Hgb improved after RBC transfusion 1/1/24  monitor    Colitis- (present on admission)  Assessment & Plan  Mild proctocolitis noted on CT abdomen however her symptoms have been ongoing for a year. Did get IV ceftriaxone/metronidazole in the ED which will be stopped.  -C.diff stool studies negative  -Can consider repeat stool studies though review of PCP/GI notes suggests that she previously had stool work-up which was unremarkable apart from elevated lactoferrin, and her diarrhea is not significantly changed recently    Chronic venous insufficiency- (present on admission)  Assessment & Plan  Review of vascular clinic notes, she has had this for over a year, previously stopped her calcium channel blocker as it was thought to be contributing but this did not lead to improvement in her leg edema.  -She had echocardiogram performed 10/2022 which showed normal EF, this was after her leg swelling started per review of previous vascular clinic notes  -2g sodium restriction    Hypertension- (present on admission)  Assessment & Plan  Continue amlodipine and losartan  -She does have lower extremity edema, amlodipine was previously thought to be contributing but it did not improve after discontinuation trial         VTE prophylaxis: SCDs

## 2024-01-04 NOTE — DISCHARGE PLANNING
@1530  DPA faxed the [employer] P[facility] Form and face sheet over to ride line.    @1545  DPA faxed the ride line form and approved services to ride line.    @5004  [facility]  notified Pam at [facility] that the pt will transport at 1630 via [facility] today, 1-04-24.

## 2024-01-04 NOTE — DISCHARGE SUMMARY
"Discharge Summary    CHIEF COMPLAINT ON ADMISSION  Chief Complaint   Patient presents with    Other     PT ARRIVES VIA EMS FOR AN 8 MONTH GI BLEED. PT STATES IT STARTED AGAIN AND SHE IS WEAK. PT ALSO WITH NO SPHINCTER CONTROL. PT STATES ANEMIA. PT STATES A \"BRIGHTER RED STOOL\" WHICH PROMPTED HER TO COME TO ED. [facility]       Reason for Admission  Colitis    Admission Date  12/30/2023     CODE STATUS  DNAR/DNI    HPI & HOSPITAL COURSE  Ms. Fuller is a 83 y.o. female with PMHx chronic venous insufficiency with chronic lower extremity edema, chronic diarrhea, hemorrhoids, iron deficiency anemia, who presented 12/30/2023 with progressively worsening fatigue, bleeding hemorrhoids, diarrhea with incontinence, inability to care for self. She has had chronic diarrhea for over a year, lately has had incontinence as well and has not been able to make it to the bathroom before soiling herself. She has had progressive fatigue over several months, worse over the last few weeks. She has had persistent bright red bleeding and pain from hemorrhoids, which she states she was told she cannot get treated surgically until her diarrhea resolves. She lives with her daughter and son-in-law, they were concerned about her worsening condition, inability to care for herself, and her incontinence. She has intermittent crampy abdominal pain which seems to be associated with food intake. Her PCP ordered C.diff studies which were negative on 12/20/2023. She did have colonoscopy earlier this year 1/25/2023 to assess etiology of her chronic diarrhea, did not show any significant findings. Patient with mild proctocolitis on CT scan, c diff testing again negative. She is treated with imodium and lomotil for diarrhea symptoms. NSAIDs held for possible microscopic colitis. Patient with intermittent hemorrhoidal bleeding, she required one unit red blood cell transfusion for acute on chronic blood loss anemia. She has stable hemoglobin level with no " further bleeding at this time. She is feeling better, although has ongoing regular diarrhea multiple times per day. Discussed outpatient surgery follow up to consider diverting colostomy patient defers for now. She is to follow up with her PCP and GI team for continued management of diarrhea and hemorrhoids.    Therefore, she is discharged in fair and stable condition to skilled nursing facility.    The patient met 2-midnight criteria for an inpatient stay at the time of discharge.      FOLLOW UP ITEMS POST DISCHARGE  Take medications as prescribed.  Follow up with PCP and GI.    DISCHARGE DIAGNOSES  Principal Problem:    Functional diarrhea (POA: Yes)  Active Problems:    Hypertension (POA: Yes)    Chronic venous insufficiency (POA: Yes)    Colitis (POA: Yes)    Iron deficiency anemia (POA: Yes)    Debility (POA: Yes)    Hemorrhoids (POA: Unknown)    Anemia (POA: Yes)  Resolved Problems:    Vaginal discharge (POA: Unknown)      FOLLOW UP  No future appointments.  [address] [id number] [phone]          MEDICATIONS ON DISCHARGE     Medication List        START taking these medications        Instructions   diphenoxylate-atropine 2.5-0.025 MG Tabs  Commonly known as: Lomotil   Take 1 Tablet by mouth 4 times a day as needed for Diarrhea for up to 7 days.  Dose: 1 Tablet     ferrous sulfate 325 (65 Fe) MG tablet  Start taking on: January 5, 2024   Take 1 Tablet by mouth every morning with breakfast.  Dose: 325 mg     hydrocortisone 1 % Crea   Apply 1 Application topically 2 times a day.  Dose: 1 Application     loperamide 2 MG Caps  Commonly known as: Imodium   Take 2 Capsules by mouth 4 times a day as needed for Diarrhea.  Dose: 4 mg            CHANGE how you take these medications        Instructions   estradiol 0.1 MG/GM vaginal cream  What changed:   how much to take  how to take this  Commonly known as: Estrace   USE 1 GRAM VAGINALLY 3 TIMES A WEEK             CONTINUE taking these medications        Instructions   amLODIPine 2.5 MG Tabs  Commonly known as: Norvasc   Doctor's comments: Please call [phone] to schedule a follow up appointment for further refills. Thank you.  TAKE ONE TABLET BY MOUTH ONCE DAILY  Dose: 2.5 mg     Colesevelam HCl 3.75 g Pack   Take 3.75 g by mouth 2 times a day.  Dose: 3.75 g     gabapentin 100 MG Caps  Commonly known as: Neurontin   Take 100 mg by mouth at bedtime.  Dose: 100 mg     latanoprost 0.005 % Soln  Commonly known as: Xalatan      losartan 25 MG Tabs  Commonly known as: Cozaar   TAKE ONE TABLET BY MOUTH EVERY DAY     MULTIVITAMIN PO   Take 1 Tablet by mouth every day.  Dose: 1 Tablet     omeprazole 20 MG delayed-release capsule  Commonly known as: PriLOSEC   Take 1 Capsule by mouth every day.  Dose: 20 mg     Vitamin D 125 MCG (5000 UT) Caps   Take 5,000 Units by mouth every day.  Dose: 5,000 Units            STOP taking these medications      acyclovir 400 MG tablet  Commonly known as: Zovirax     celecoxib 100 MG Caps  Commonly known as: CeleBREX     TYLENOL ARTHRITIS PAIN PO              Allergies  Allergies   Allergen Reactions    Ibuprofen Unspecified     Stomach upset    Morphine Vomiting    Lisinopril Unspecified     dizz    Other Misc      Metals: zinc oxide, vanadium chloride, manganese chloride       DIET  Orders Placed This Encounter   Procedures    Diet Order Diet: 2 Gram Sodium     Standing Status:   Standing     Number of Occurrences:   1     Order Specific Question:   Diet:     Answer:   2 Gram Sodium [7]       ACTIVITY  As tolerated and directed by skilled nursing.  Weight bearing as tolerated    LINES, DRAINS, AND WOUNDS  This is an automated list. Peripheral IVs will be removed prior to discharge.  Peripheral IV 01/01/24 22 G Anterior;Right Upper arm (Active)   Site Assessment Clean;Dry;Intact 01/04/24 0951   Dressing Type Transparent 01/04/24 0951   Line Status Flushed;Saline locked 01/04/24 0951    Dressing Status Clean;Dry;Intact 01/04/24 0951   Dressing Intervention N/A 01/04/24 0951   Dressing Change Due 01/06/24 01/04/24 0951   Infiltration Grading ([facility], CV) 0 01/04/24 0951   Phlebitis Scale ([facility] Only) 0 01/04/24 0951       Wound 12/31/23 Skin Tear Coccyx (Active)       Wound 12/31/23 Back Midline;Upper looks like dilated pore (Active)       Wound 12/31/23 Toe, 3rd;Toe, 4th Left purple discoloration (Active)       Peripheral IV 01/01/24 22 G Anterior;Right Upper arm (Active)   Site Assessment Clean;Dry;Intact 01/04/24 0951   Dressing Type Transparent 01/04/24 0951   Line Status Flushed;Saline locked 01/04/24 0951   Dressing Status Clean;Dry;Intact 01/04/24 0951   Dressing Intervention N/A 01/04/24 0951   Dressing Change Due 01/06/24 01/04/24 0951   Infiltration Grading ([facility], CV) 0 01/04/24 0951   Phlebitis Scale ([facility] Only) 0 01/04/24 0951               MENTAL STATUS ON TRANSFER      Alert, oriented x3       CONSULTATIONS  none    PROCEDURES  none    LABORATORY  Lab Results   Component Value Date    SODIUM 131 (L) 01/01/2024    POTASSIUM 3.7 01/01/2024    CHLORIDE 103 01/01/2024    CO2 20 01/01/2024    GLUCOSE 111 (H) 01/01/2024    BUN 28 (H) 01/01/2024    CREATININE 0.61 01/01/2024        Lab Results   Component Value Date    WBC 6.0 01/03/2024    HEMOGLOBIN 8.9 (L) 01/03/2024    HEMATOCRIT 26.3 (L) 01/03/2024    PLATELETCT 143 (L) 01/03/2024        Total time of the discharge process exceeds 35 minutes.

## 2024-01-04 NOTE — PROGRESS NOTES
Patient has not yet voided since bond catheter removal. Pt states she has no urge or discomfort.     Bladder scan done at approximately 6 hours post bond catheter removal= 134 mL.    Per MD Liz, if bladder scan> 400 mL reinsert bond catheter.

## 2024-01-05 NOTE — DISCHARGE PLANNING
DC Transport Scheduled    Received request at: 1/4/2024 at 1545    Transport Company Scheduled:  [facility]  Spoke with Sabine at [facility] to schedule transport.    Scheduled Date: 1/4/2024  Scheduled Time: 1630    Destination: [facility] at [address] [place] [address] [address]     Notified care team of scheduled transport via Voalte.     If there are any changes needed to the DC transportation scheduled, please contact [facility] at ext. [phone] between the hours of 2605-6445 Mon-Fri. If outside those hours, contact the ED Case Manager at ext. [phone].

## 2024-03-09 NOTE — ASSESSMENT & PLAN NOTE
BRBPR on 9/23/2020 about 5-7 episodes small to moderate volume each episode with visible blood clots per patient, no associated dizziness, light headedness, or abdominal cramping, no NSAID use, no history of patient.    Extensive history of diverticular bleed in the past most recently in June 2020 hemoglobin with Hgb of 8.8, colonoscopy performed with no identification of source of bleed.  No h/o ulcer or UGIB.  H/H and vitals stable. Bowel movements have stopped.  She has normal coagulation panel.   GI consulted from ED and will prep the patient tonight for a colonoscopy in the AM, patient is agreeable.  Monitor with CBC q8h, vitals every 4 hours, cardiac monitoring.  Suspecting diverticular bleed but other differentials diagnosis include malignancy, angiodysplasia, colitis.           
H/O multiple diverticular bleeds reportedly since she was in her 30's.   No evidence for infection at this time, no changes in bowel habits or abdominal cramping, she is afebrile without leukocytosis.   Dr. Pavon is the patient's outpatient Gastroenterologist.   
No abdominal cramping or tenderness.   No h/o ulcers.   Will continue patient's home PPI  
Will hold home amlodipine for now given presence of GI bleed.   Will place PRN antihypertensive for SBP>185 or DBP>105.    
Imaging Studies/Medications

## (undated) DEVICE — GLOVE BIOGEL SZ 6.5 SURGICAL PF LTX (50PR/BX 4BX/CA)

## (undated) DEVICE — GLOVE BIOGEL PI ORTHO SZ 8.5 PF LF (40/BX)

## (undated) DEVICE — CANISTER SUCTION 3000ML MECHANICAL FILTER AUTO SHUTOFF MEDI-VAC NONSTERILE LF DISP  (40EA/CA)

## (undated) DEVICE — MASK ANESTHESIA ADULT  - (100/CA)

## (undated) DEVICE — CATHETER IV 20 GA X 1-1/4 ---SURG.& SDS ONLY--- (50EA/BX)

## (undated) DEVICE — GLOVE BIOGEL ECLIPSE PF LATEX SIZE 9.0

## (undated) DEVICE — SLEEVE, VASO, THIGH, MED

## (undated) DEVICE — TUBING CLEARLINK DUO-VENT - C-FLO (48EA/CA)

## (undated) DEVICE — SET EXTENSION WITH 2 PORTS (48EA/CA) ***PART #2C8610 IS A SUBSTITUTE*****

## (undated) DEVICE — SENSOR SPO2 NEO LNCS ADHESIVE (20/BX) SEE USER NOTES

## (undated) DEVICE — KIT ROOM DECONTAMINATION

## (undated) DEVICE — MANIFOLD NEPTUNE 1 PORT (20/PK)

## (undated) DEVICE — HEAD HOLDER JUNIOR/ADULT

## (undated) DEVICE — GOWN WARMING STANDARD FLEX - (30/CA)

## (undated) DEVICE — KIT CUSTOM PROCEDURE SINGLE FOR ENDO  (15/CA)

## (undated) DEVICE — CANNULA W/ SUPPLY TUBING O2 - (50/CA)

## (undated) DEVICE — SNARECAPTIVATOR II - 20MM ROUND STIFF (40/BX)

## (undated) DEVICE — SUTURE 2-0 VICRYL PLUS CT-1 - 8 X 18 INCH(12/BX)

## (undated) DEVICE — CANNULA O2 COMFORT SOFT EAR ADULT 7 FT TUBING (50/CA)

## (undated) DEVICE — ELECTRODE DUAL RETURN W/ CORD - (50/PK)

## (undated) DEVICE — KIT ANESTHESIA W/CIRCUIT & 3/LT BAG W/FILTER (20EA/CA)

## (undated) DEVICE — ELECTRODE 850 FOAM ADHESIVE - HYDROGEL RADIOTRNSPRNT (50/PK)

## (undated) DEVICE — FILM CASSETTE ENDO

## (undated) DEVICE — CANISTER SUCTION RIGID RED 1500CC (40EA/CA)

## (undated) DEVICE — TUBING O2 7FT TIP SMTH BORE - (50/CA)

## (undated) DEVICE — PAD PREP 24 X 48 CUFFED - (100/CA)

## (undated) DEVICE — SUTURE 0 VICRYL PLUS CT-1 - 8 X 18 INCH (12/BX)

## (undated) DEVICE — SUCTION INSTRUMENT YANKAUER BULBOUS TIP W/O VENT (50EA/CA)

## (undated) DEVICE — SUTURE 4-0 ETHILON FS-1 18 (36PK/BX)"

## (undated) DEVICE — LACTATED RINGERS INJ 1000 ML - (14EA/CA 60CA/PF)

## (undated) DEVICE — SET LEADWIRE 5 LEAD BEDSIDE DISPOSABLE ECG (1SET OF 5/EA)

## (undated) DEVICE — WATER IRRIG. STER. 1500 ML - (9/CA)

## (undated) DEVICE — GLOVE BIOGEL ECLIPSE  PF LATEX SIZE 6.5 (50PR/BX)

## (undated) DEVICE — SUTURE GENERAL

## (undated) DEVICE — NEPTUNE 4 PORT MANIFOLD - (20/PK)

## (undated) DEVICE — TUBE CONNECTING SUCTION - CLEAR PLASTIC STERILE 72 IN (50EA/CA)

## (undated) DEVICE — SODIUM CHL IRRIGATION 0.9% 1000ML (12EA/CA)

## (undated) DEVICE — MASK WITH FACE SHIELD (25/BX 4BX/CA)

## (undated) DEVICE — CHLORAPREP 26 ML APPLICATOR - ORANGE TINT(25/CA)

## (undated) DEVICE — PACK LOWER EXTREMITY - (2/CA)

## (undated) DEVICE — TRAP POLYP E-TRAP (25EA/BX)

## (undated) DEVICE — PROTECTOR ULNA NERVE - (36PR/CA)

## (undated) DEVICE — TUBE NG SALEM SUMP 14FR (50EA/BX)

## (undated) DEVICE — BANDAGE ELASTIC 4 HONEYCOMB - 4"X5YD LF (20/CA)"

## (undated) DEVICE — DRAPE C-ARM LARGE 41IN X 74 IN - (10/BX 2BX/CA)